# Patient Record
Sex: FEMALE | Race: WHITE | NOT HISPANIC OR LATINO | Employment: OTHER | URBAN - NONMETROPOLITAN AREA
[De-identification: names, ages, dates, MRNs, and addresses within clinical notes are randomized per-mention and may not be internally consistent; named-entity substitution may affect disease eponyms.]

---

## 2017-01-30 DIAGNOSIS — I10 BENIGN ESSENTIAL HYPERTENSION: Primary | ICD-10-CM

## 2017-01-30 RX ORDER — FUROSEMIDE 20 MG
20 TABLET ORAL DAILY
Qty: 30 TABLET | Refills: 0 | Status: SHIPPED | OUTPATIENT
Start: 2017-01-30 | End: 2017-02-24

## 2017-01-30 NOTE — TELEPHONE ENCOUNTER
LASIX      Last Written Prescription Date: 1/27/16  Last Fill Quantity: 90, # refills: 3  Last Office Visit with [facility], [facility] or [facility] prescribing provider: 1/27/16       POTASSIUM   Date Value Ref Range Status   01/27/2016 4.5 3.4 - 5.3 mmol/L Final     CREATININE   Date Value Ref Range Status   01/27/2016 1.00 0.52 - 1.04 mg/dL Final     BP Readings from Last 3 Encounters:   01/27/16 134/72   07/28/15 130/82   06/08/15 151/67

## 2017-01-31 DIAGNOSIS — E03.9 ACQUIRED HYPOTHYROIDISM: Primary | ICD-10-CM

## 2017-01-31 RX ORDER — LEVOTHYROXINE SODIUM 112 UG/1
112 TABLET ORAL DAILY
Qty: 30 TABLET | Refills: 11 | Status: SHIPPED | OUTPATIENT
Start: 2017-01-31 | End: 2018-01-25

## 2017-01-31 NOTE — TELEPHONE ENCOUNTER
Patient is at [facility] and is out of her Levothyroxine and is getting sick.    Mela [place]

## 2017-01-31 NOTE — TELEPHONE ENCOUNTER
TSH   Date Value Ref Range Status   01/27/2016 3.43 0.40 - 4.00 mU/L Final   06/05/2015 3.58 0.40 - 4.00 mU/L Final   04/03/2015 6.18* 0.40 - 4.00 mU/L Final     Comment:     Effective 7/30/2014, the reference range for this assay has changed to reflect   new instrumentation/methodology.     01/23/2015 4.74* 0.40 - 4.00 mU/L Final     Comment:     Effective 7/30/2014, the reference range for this assay has changed to reflect   new instrumentation/methodology.     11/12/2014 5.92* 0.40 - 4.00 mU/L Final     Comment:     Effective 7/30/2014, the reference range for this assay has changed to reflect   new instrumentation/methodology.         Refill given.  SPENCER Keith RN

## 2017-02-24 ENCOUNTER — OFFICE VISIT (OUTPATIENT)
Dept: FAMILY MEDICINE | Facility: CLINIC | Age: 82
End: 2017-02-24
Payer: COMMERCIAL

## 2017-02-24 VITALS
RESPIRATION RATE: 18 BRPM | TEMPERATURE: 98 F | HEART RATE: 68 BPM | DIASTOLIC BLOOD PRESSURE: 84 MMHG | OXYGEN SATURATION: 97 % | SYSTOLIC BLOOD PRESSURE: 126 MMHG | BODY MASS INDEX: 33.76 KG/M2 | WEIGHT: 206 LBS

## 2017-02-24 DIAGNOSIS — Z23 ENCOUNTER FOR IMMUNIZATION: ICD-10-CM

## 2017-02-24 DIAGNOSIS — K29.50 CHRONIC GASTRITIS WITHOUT BLEEDING, UNSPECIFIED GASTRITIS TYPE: ICD-10-CM

## 2017-02-24 DIAGNOSIS — I10 BENIGN ESSENTIAL HYPERTENSION: ICD-10-CM

## 2017-02-24 DIAGNOSIS — E03.9 ACQUIRED HYPOTHYROIDISM: Primary | ICD-10-CM

## 2017-02-24 DIAGNOSIS — R73.09 ELEVATED GLUCOSE: ICD-10-CM

## 2017-02-24 LAB
ANION GAP SERPL CALCULATED.3IONS-SCNC: 10 MMOL/L (ref 3–14)
BUN SERPL-MCNC: 21 MG/DL (ref 7–30)
CALCIUM SERPL-MCNC: 8.2 MG/DL (ref 8.5–10.1)
CHLORIDE SERPL-SCNC: 99 MMOL/L (ref 94–109)
CO2 SERPL-SCNC: 25 MMOL/L (ref 20–32)
CREAT SERPL-MCNC: 0.97 MG/DL (ref 0.52–1.04)
GFR SERPL CREATININE-BSD FRML MDRD: 54 ML/MIN/1.7M2
GLUCOSE SERPL-MCNC: 218 MG/DL (ref 70–99)
POTASSIUM SERPL-SCNC: 4.2 MMOL/L (ref 3.4–5.3)
SODIUM SERPL-SCNC: 134 MMOL/L (ref 133–144)
TSH SERPL DL<=0.005 MIU/L-ACNC: 3.34 MU/L (ref 0.4–4)

## 2017-02-24 PROCEDURE — 84443 ASSAY THYROID STIM HORMONE: CPT | Performed by: NURSE PRACTITIONER

## 2017-02-24 PROCEDURE — 36415 COLL VENOUS BLD VENIPUNCTURE: CPT | Performed by: NURSE PRACTITIONER

## 2017-02-24 PROCEDURE — 83036 HEMOGLOBIN GLYCOSYLATED A1C: CPT | Performed by: NURSE PRACTITIONER

## 2017-02-24 PROCEDURE — 99214 OFFICE O/P EST MOD 30 MIN: CPT | Mod: 25 | Performed by: NURSE PRACTITIONER

## 2017-02-24 PROCEDURE — G0009 ADMIN PNEUMOCOCCAL VACCINE: HCPCS | Performed by: NURSE PRACTITIONER

## 2017-02-24 PROCEDURE — 80048 BASIC METABOLIC PNL TOTAL CA: CPT | Performed by: NURSE PRACTITIONER

## 2017-02-24 PROCEDURE — 90670 PCV13 VACCINE IM: CPT | Performed by: NURSE PRACTITIONER

## 2017-02-24 RX ORDER — FUROSEMIDE 20 MG
20 TABLET ORAL DAILY
Qty: 30 TABLET | Refills: 0 | Status: CANCELLED | OUTPATIENT
Start: 2017-02-24

## 2017-02-24 RX ORDER — OMEPRAZOLE 40 MG/1
40 CAPSULE, DELAYED RELEASE ORAL DAILY
Qty: 30 CAPSULE | Refills: 0 | Status: CANCELLED | OUTPATIENT
Start: 2017-02-24

## 2017-02-24 NOTE — PROGRESS NOTES
SUBJECTIVE:                                                    Adela Means is a 89 year old female who presents to clinic today for the following health issues:      Medication Followup of  LASIX, PRILOSEC    Taking Medication as prescribed: yes    Side Effects:  None    Medication Helping Symptoms:  yes     Denies any trouble with reflex  Has never tried not taking the omeprazole     She is taking lasix for her blood pressure  Denies any trouble with swelling in her legs  BP Readings from Last 6 Encounters:   02/24/17 126/84   01/27/16 134/72   07/28/15 130/82   06/08/15 151/67   06/05/15 122/72   04/03/15 142/84     Denies any dizziness or falls   Reports having to go to the bathroom frequently     TSH   Date Value Ref Range Status   01/27/2016 3.43 0.40 - 4.00 mU/L Final   06/05/2015 3.58 0.40 - 4.00 mU/L Final   04/03/2015 6.18 (H) 0.40 - 4.00 mU/L Final     Comment:     Effective 7/30/2014, the reference range for this assay has changed to reflect   new instrumentation/methodology.     01/23/2015 4.74 (H) 0.40 - 4.00 mU/L Final     Comment:     Effective 7/30/2014, the reference range for this assay has changed to reflect   new instrumentation/methodology.     11/12/2014 5.92 (H) 0.40 - 4.00 mU/L Final     Comment:     Effective 7/30/2014, the reference range for this assay has changed to reflect   new instrumentation/methodology.       Due for a TSH recheck   Denies any new current symptoms              Problem list and histories reviewed & adjusted, as indicated.  Additional history: as documented    Problem list, Medication list, Allergies, and Medical/Social/Surgical histories reviewed in [facility] and updated as appropriate.    ROS:  Constitutional, HEENT, cardiovascular, pulmonary, gi and gu systems are negative, except as otherwise noted.    OBJECTIVE:                                                    /84 (BP Location: Right arm, Patient Position: Chair, Cuff Size: Adult Regular)  Pulse 68  Temp  98  F (36.7  C) (Tympanic)  Resp 18  Wt 206 lb (93.4 kg)  SpO2 97%  BMI 33.76 kg/m2  Body mass index is 33.76 kg/(m^2).  GENERAL APPEARANCE: healthy, alert and no distress  RESP: lungs clear to auscultation - no rales, rhonchi or wheezes  CV: regular rates and rhythm, normal S1 S2, no S3 or S4 and no murmur, click or rub  ABDOMEN: soft, nontender, without hepatosplenomegaly or masses and bowel sounds normal  SKIN: no suspicious lesions or rashes  PSYCH: mentation appears normal and affect normal/bright    Diagnostic test results:  Diagnostic Test Results:  Pending      ASSESSMENT/PLAN:                                                    1. Benign essential hypertension  Stable   Will stop the diuretic today   Recheck in 2 weeks   BP goal is < 150/80mmHg to avoid risk of hypotension, falls, dizziness and tissue hypoperfusion.    - Basic metabolic panel    2. Gastritis  Discussed long term PPI use is not recommended   Will stop the omeprazole   Recheck in 2 weeks      3. Acquired hypothyroidism  Will check TSH today   Refill accordingly   - TSH with free T4 reflex    4. Encounter for immunization  Given today   She did have some mild swelling after injection   But no trouble breathing   She was monitor for 15 minutes prior to d/c   - PNEUMOCOCCAL CONJ VACCINE 13 VALENT IM (PREVNAR 13)      Patient Instructions   Pneumonia booster today     Labs checked      Stop the omeprazole (watch for increase upset stomach, heart burn)    Stop the furosemide (watch for increased swelling)      See me back in 2 weeks for a recheck on medication changes     Will refill the levothyroxine once I get you lab results     Nice meeting you today!        Sofia Ascencio NP  [facility]

## 2017-02-24 NOTE — NURSING NOTE
"No chief complaint on file.      Initial /84 (BP Location: Right arm, Patient Position: Chair, Cuff Size: Adult Regular)  Pulse 68  Temp 98  F (36.7  C) (Tympanic)  Resp 18  Wt 206 lb (93.4 kg)  SpO2 97%  BMI 33.76 kg/m2 Estimated body mass index is 33.76 kg/(m^2) as calculated from the following:    Height as of 1/27/16: 5' 5.5\" (1.664 m).    Weight as of this encounter: 206 lb (93.4 kg).  Medication Reconciliation: complete    Health Maintenance that is potentially due pending provider review:  none    n/a      "

## 2017-02-24 NOTE — MR AVS SNAPSHOT
"              After Visit Summary   2/24/2017    Adela Means    MRN: [id number]           Patient Information     Date Of Birth          3/10/1927        Visit Information        Provider Department      2/24/2017 1:00 PM Sofia Ascencio NP [facility]        Today's Diagnoses     Acquired hypothyroidism    -  1    Benign essential hypertension        Gastritis          Care Instructions    Pneumonia booster today     Labs checked      Stop the omeprazole (watch for increase upset stomach, heart burn)    Stop the furosemide (watch for increased swelling)      See me back in 2 weeks for a recheck on medication changes     Will refill the levothyroxine once I get you lab results     Nice meeting you today!          Follow-ups after your visit        Who to contact     If you have questions or need follow up information about today's clinic visit or your schedule please contact [facility] directly at [phone].  Normal or non-critical lab and imaging results will be communicated to you by [employer], letter or phone within 4 business days after the clinic has received the results. If you do not hear from us within 7 days, please contact the clinic through [employer] or phone. If you have a critical or abnormal lab result, we will notify you by phone as soon as possible.  Submit refill requests through [employer] or call your pharmacy and they will forward the refill request to us. Please allow 3 business days for your refill to be completed.          Additional Information About Your Visit        [employer] Information     [employer] lets you send messages to your doctor, view your test results, renew your prescriptions, schedule appointments and more. To sign up, go to [email or web address] . Click on \"Log in\" on the left side of the screen, which will take you to the Welcome page. Then click on \"Sign up Now\" on the right side of the page.     You will be asked to enter the access code " listed below, as well as some personal information. Please follow the directions to create your username and password.     Your access code is: [email or web address]  Expires: 2017  1:21 PM     Your access code will  in 90 days. If you need help or a new code, please call your [facility] or [phone].        Care EveryWhere ID     This is your Care EveryWhere ID. This could be used by other organizations to access your [place] medical records  [id number]        Your Vitals Were     Pulse Temperature Respirations Pulse Oximetry BMI (Body Mass Index)       68 98  F (36.7  C) (Tympanic) 18 97% 33.76 kg/m2        Blood Pressure from Last 3 Encounters:   17 126/84   16 134/72   07/28/15 130/82    Weight from Last 3 Encounters:   17 206 lb (93.4 kg)   16 211 lb (95.7 kg)   07/28/15 208 lb (94.3 kg)              We Performed the Following     Basic metabolic panel     TSH with free T4 reflex          Today's Medication Changes          These changes are accurate as of: 17  1:21 PM.  If you have any questions, ask your nurse or doctor.               Stop taking these medicines if you haven't already. Please contact your care team if you have questions.     furosemide 20 MG tablet   Commonly known as:  LASIX   Stopped by:  Sofia Ascencio NP           omeprazole 40 MG capsule   Commonly known as:  priLOSEC   Stopped by:  Sofia Ascencio NP                    Primary Care Provider Office Phone # Fax #    Yoselyn Castrejon [phone]       [address] [address]        Thank you!     Thank you for choosing [facility]  for your care. Our goal is always to provide you with excellent care. Hearing back from our patients is one way we can continue to improve our services. Please take a few minutes to complete the written survey that you may receive in the mail after your visit with us. Thank you!              Your Updated Medication List - Protect others around you: Learn how to safely use, store and throw away your medicines at www.disposemymeds.org.          This list is accurate as of: 2/24/17  1:21 PM.  Always use your most recent med list.                   Brand Name Dispense Instructions for use    aspirin 81 MG tablet     90 tablet    Take 1 tablet (81 mg) by mouth daily       calcium carbonate 600 MG tablet    OS-KATHY 600 mg [place]. Ca    180 tablet    Take 1 tablet (600 mg) by mouth 2 times daily (with meals)       cholecalciferol 1000 UNIT tablet    vitamin D    90 tablet    Take 1 tablet (1,000 Units) by mouth daily       clobetasol 0.05 % external solution    TEMOVATE    100 mL    Apply sparingly to affected area twice daily for 14 days.  Do not apply to face.       fish oil-omega-3 fatty acids 1000 MG capsule      Take 1 capsule by mouth 2 times daily.       fluocinolone 0.01 % cream    SYNALAR    30 g    Apply topically 2 times daily       Incontinence Supply Disposable Misc     3 months    use daily       ketoconazole 2 % shampoo    NIZORAL    240 mL    Apply to the affected area and wash off after 5 minutes. Use 2-3 times per week.       levothyroxine 112 MCG tablet    SYNTHROID    30 tablet    Take 1 tablet (112 mcg) by mouth daily       * order for DME     1 each    Equipment being ordered: GLOVES       * order for DME     1 each    Equipment being ordered: INCONTINENT PRODUCT UP / MONTH.       Salicylic Acid 3 % Sham     1 Bottle    Use shampoo twice a week.       sodium chloride 0.65 % nasal spray    OCEAN    1 Bottle    Spray 1 spray in nostril daily as needed for congestion.       * Notice:  This list has 2 medication(s) that are the same as other medications prescribed for you. Read the directions carefully, and ask your doctor or other care provider to review them with you.

## 2017-02-24 NOTE — PATIENT INSTRUCTIONS
Pneumonia booster today     Labs checked      Stop the omeprazole (watch for increase upset stomach, heart burn)    Stop the furosemide (watch for increased swelling)      See me back in 2 weeks for a recheck on medication changes     Will refill the levothyroxine once I get you lab results     Nice meeting you today!

## 2017-02-27 LAB — HBA1C MFR BLD: 7.3 % (ref 4.3–6)

## 2017-03-07 ENCOUNTER — OFFICE VISIT (OUTPATIENT)
Dept: FAMILY MEDICINE | Facility: CLINIC | Age: 82
End: 2017-03-07
Payer: COMMERCIAL

## 2017-03-07 VITALS
HEIGHT: 66 IN | WEIGHT: 208 LBS | BODY MASS INDEX: 33.43 KG/M2 | TEMPERATURE: 96.7 F | HEART RATE: 64 BPM | SYSTOLIC BLOOD PRESSURE: 138 MMHG | RESPIRATION RATE: 16 BRPM | DIASTOLIC BLOOD PRESSURE: 70 MMHG

## 2017-03-07 DIAGNOSIS — R60.9 EDEMA, UNSPECIFIED TYPE: Primary | ICD-10-CM

## 2017-03-07 DIAGNOSIS — E11.9 TYPE 2 DIABETES MELLITUS WITHOUT COMPLICATION, WITHOUT LONG-TERM CURRENT USE OF INSULIN (H): ICD-10-CM

## 2017-03-07 DIAGNOSIS — K29.50 CHRONIC GASTRITIS WITHOUT BLEEDING, UNSPECIFIED GASTRITIS TYPE: ICD-10-CM

## 2017-03-07 LAB
CREAT UR-MCNC: 58 MG/DL
MICROALBUMIN UR-MCNC: 43 MG/L
MICROALBUMIN/CREAT UR: 73.96 MG/G CR (ref 0–25)

## 2017-03-07 PROCEDURE — 82043 UR ALBUMIN QUANTITATIVE: CPT | Performed by: NURSE PRACTITIONER

## 2017-03-07 PROCEDURE — 99213 OFFICE O/P EST LOW 20 MIN: CPT | Performed by: NURSE PRACTITIONER

## 2017-03-07 RX ORDER — FUROSEMIDE 20 MG
20 TABLET ORAL DAILY
Qty: 90 TABLET | Refills: 3 | Status: SHIPPED | OUTPATIENT
Start: 2017-03-07 | End: 2018-03-13

## 2017-03-07 NOTE — PATIENT INSTRUCTIONS
Restart the water the lasix.     Stay off the omeprazole  TUMS are OK if upset stomach or reflux      Blood sugar is in the diabetes range   Watch sugar intake     Try and exercise for 30 minutes most days      See me back in 6 months

## 2017-03-07 NOTE — PROGRESS NOTES
"  SUBJECTIVE:                                                    Adela Means is a 89 year old female who presents to clinic today for the following health issues:       Medication Recheck      Duration: 2 week follow up    Description (location/character/radiation): follow up since med change    Intensity:  mild    Accompanying signs and symptoms: none    History (similar episodes/previous evaluation): None    Precipitating or alleviating factors: None    Therapies tried and outcome: None     Stopped the lasix   Reports that she has trouble now with urinating and feeling as though she needs to strain to go.     Has done fine off the omeprazole   Take Tums occasionally       Problem list and histories reviewed & adjusted, as indicated.  Additional history: as documented    Labs reviewed in EPIC    Reviewed and updated as needed this visit by clinical staff  Tobacco  Allergies  Med Hx  Surg Hx  Fam Hx  Soc Hx      Reviewed and updated as needed this visit by Provider         ROS:  Constitutional, HEENT, cardiovascular, pulmonary, gi and gu systems are negative, except as otherwise noted.    OBJECTIVE:                                                    /70 (BP Location: Right arm, Cuff Size: Adult Regular)  Pulse 64  Temp 96.7  F (35.9  C) (Tympanic)  Resp 16  Ht 5' 5.5\" (1.664 m)  Wt 208 lb (94.3 kg)  Breastfeeding? No  BMI 34.09 kg/m2  Body mass index is 34.09 kg/(m^2).  GENERAL APPEARANCE: healthy, alert and no distress  RESP: lungs clear to auscultation - no rales, rhonchi or wheezes  CV: regular rates and rhythm, normal S1 S2, no S3 or S4 and no murmur, click or rub  ABDOMEN: soft, nontender, without hepatosplenomegaly or masses and bowel sounds normal    Diagnostic test results:  Diagnostic Test Results:  Results for orders placed or performed in visit on 02/24/17   TSH with free T4 reflex   Result Value Ref Range    TSH 3.34 0.40 - 4.00 mU/L   Basic metabolic panel   Result Value Ref Range    " Sodium 134 133 - 144 mmol/L    Potassium 4.2 3.4 - 5.3 mmol/L    Chloride 99 94 - 109 mmol/L    Carbon Dioxide 25 20 - 32 mmol/L    Anion Gap 10 3 - 14 mmol/L    Glucose 218 (H) 70 - 99 mg/dL    Urea Nitrogen 21 7 - 30 mg/dL    Creatinine 0.97 0.52 - 1.04 mg/dL    GFR Estimate 54 (L) >60 mL/min/1.7m2    GFR Estimate If Black 65 >60 mL/min/1.7m2    Calcium 8.2 (L) 8.5 - 10.1 mg/dL   Hemoglobin A1c   Result Value Ref Range    Hemoglobin A1C 7.3 (H) 4.3 - 6.0 %        ASSESSMENT/PLAN:                                                    1. Edema, unspecified type  Reports feeling like unable to urinate   Wants to restart the lasix   Will restart today   - furosemide (LASIX) 20 MG tablet; Take 1 tablet (20 mg) by mouth daily  Dispense: 90 tablet; Refill: 3    2. Type 2 diabetes mellitus without complication, without long-term current use of insulin (H)  New diagnosis   Given age goal is A1C to remain below 8%  Will not start medication rather diet and exercise and monitor   Recheck in 6 months   - Albumin Random Urine Quantitative  - ACE/ARB NOT PRESCRIBED, INTENTIONAL,; Please choose reason not prescribed, below    3. Chronic gastritis without bleeding, unspecified gastritis type  Has done well off the PPI      Patient Instructions   Restart the water the lasix.     Stay off the omeprazole  TUMS are OK if upset stomach or reflux      Blood sugar is in the diabetes range   Watch sugar intake     Try and exercise for 30 minutes most days      See me back in 6 months           Sofia Ascencio NP  [facility]

## 2017-03-07 NOTE — NURSING NOTE
"Chief Complaint   Patient presents with     Hypertension     recheck after med changes       Initial /70 (BP Location: Right arm, Cuff Size: Adult Regular)  Pulse 64  Temp 96.7  F (35.9  C) (Tympanic)  Resp 16  Ht 5' 5.5\" (1.664 m)  Wt 208 lb (94.3 kg)  Breastfeeding? No  BMI 34.09 kg/m2 Estimated body mass index is 34.09 kg/(m^2) as calculated from the following:    Height as of this encounter: 5' 5.5\" (1.664 m).    Weight as of this encounter: 208 lb (94.3 kg).  Medication Reconciliation: complete    Health Maintenance that is potentially due pending provider review:  NONE    n/a    Ladi Law CMA    "

## 2017-03-07 NOTE — MR AVS SNAPSHOT
"              After Visit Summary   3/7/2017    Adela Means    MRN: [id number]           Patient Information     Date Of Birth          3/10/1927        Visit Information        Provider Department      3/7/2017 1:40 PM Sofia Ascencio NP [facility]        Today's Diagnoses     Edema, unspecified type    -  1    Type 2 diabetes mellitus without complication, without long-term current use of insulin (H)          Care Instructions    Restart the water the lasix.     Stay off the omeprazole  TUMS are OK if upset stomach or reflux      Blood sugar is in the diabetes range   Watch sugar intake     Try and exercise for 30 minutes most days      See me back in 6 months             Follow-ups after your visit        Who to contact     If you have questions or need follow up information about today's clinic visit or your schedule please contact [facility] directly at [phone].  Normal or non-critical lab and imaging results will be communicated to you by [employer], letter or phone within 4 business days after the clinic has received the results. If you do not hear from us within 7 days, please contact the clinic through [employer] or phone. If you have a critical or abnormal lab result, we will notify you by phone as soon as possible.  Submit refill requests through [employer] or call your pharmacy and they will forward the refill request to us. Please allow 3 business days for your refill to be completed.          Additional Information About Your Visit        MyChart Information     [employer] lets you send messages to your doctor, view your test results, renew your prescriptions, schedule appointments and more. To sign up, go to [email or web address] . Click on \"Log in\" on the left side of the screen, which will take you to the Welcome page. Then click on \"Sign up Now\" on the right side of the page.     You will be asked to enter the access code listed below, as well as some personal " "information. Please follow the directions to create your username and password.     Your access code is: [email or web address]  Expires: 2017  1:21 PM     Your access code will  in 90 days. If you need help or a new code, please call your [facility] or [phone].        Care EveryWhere ID     This is your Care EveryWhere ID. This could be used by other organizations to access your [place] medical records  [id number]        Your Vitals Were     Pulse Temperature Respirations Height Breastfeeding? BMI (Body Mass Index)    64 96.7  F (35.9  C) (Tympanic) 16 5' 5.5\" (1.664 m) No 34.09 kg/m2       Blood Pressure from Last 3 Encounters:   17 138/70   17 126/84   16 134/72    Weight from Last 3 Encounters:   17 208 lb (94.3 kg)   17 206 lb (93.4 kg)   16 211 lb (95.7 kg)              Today, you had the following     No orders found for display         Today's Medication Changes          These changes are accurate as of: 3/7/17  2:01 PM.  If you have any questions, ask your nurse or doctor.               Start taking these medicines.        Dose/Directions    furosemide 20 MG tablet   Commonly known as:  LASIX   Used for:  Edema, unspecified type, Type 2 diabetes mellitus without complication, without long-term current use of insulin (H)   Started by:  Sofia Ascencio, VERONICA        Dose:  20 mg   Take 1 tablet (20 mg) by mouth daily   Quantity:  90 tablet   Refills:  3            Where to get your medicines      These medications were sent to [id number] - [facility] [address]   [address] , [facility] [address]     Phone:  [phone]     furosemide 20 MG tablet                Primary Care Provider Office Phone # Fax #    Yoselyn Castrejon [phone]       [facility] [address] [address]        Thank you!     Thank you for choosing [facility]  for your care. Our goal is " always to provide you with excellent care. Hearing back from our patients is one way we can continue to improve our services. Please take a few minutes to complete the written survey that you may receive in the mail after your visit with us. Thank you!             Your Updated Medication List - Protect others around you: Learn how to safely use, store and throw away your medicines at www.disposemymeds.org.          This list is accurate as of: 3/7/17  2:01 PM.  Always use your most recent med list.                   Brand Name Dispense Instructions for use    aspirin 81 MG tablet     90 tablet    Take 1 tablet (81 mg) by mouth daily       calcium carbonate 600 MG tablet    OS-KATHY 600 mg [place]. Ca    180 tablet    Take 1 tablet (600 mg) by mouth 2 times daily (with meals)       cholecalciferol 1000 UNIT tablet    vitamin D    90 tablet    Take 1 tablet (1,000 Units) by mouth daily       clobetasol 0.05 % external solution    TEMOVATE    100 mL    Apply sparingly to affected area twice daily for 14 days.  Do not apply to face.       fish oil-omega-3 fatty acids 1000 MG capsule      Take 1 capsule by mouth 2 times daily.       fluocinolone 0.01 % cream    SYNALAR    30 g    Apply topically 2 times daily       furosemide 20 MG tablet    LASIX    90 tablet    Take 1 tablet (20 mg) by mouth daily       Incontinence Supply Disposable Misc     3 months    use daily       ketoconazole 2 % shampoo    NIZORAL    240 mL    Apply to the affected area and wash off after 5 minutes. Use 2-3 times per week.       levothyroxine 112 MCG tablet    SYNTHROID    30 tablet    Take 1 tablet (112 mcg) by mouth daily       * order for DME     1 each    Equipment being ordered: GLOVES       * order for DME     1 each    Equipment being ordered: INCONTINENT PRODUCT UP / MONTH.       Salicylic Acid 3 % Sham     1 Bottle    Use shampoo twice a week.       sodium chloride 0.65 % nasal spray    OCEAN    1 Bottle    Spray 1 spray in nostril  daily as needed for congestion.       * Notice:  This list has 2 medication(s) that are the same as other medications prescribed for you. Read the directions carefully, and ask your doctor or other care provider to review them with you.

## 2017-03-13 PROBLEM — E11.9 TYPE 2 DIABETES MELLITUS WITHOUT COMPLICATION, WITHOUT LONG-TERM CURRENT USE OF INSULIN (H): Status: ACTIVE | Noted: 2017-03-13

## 2017-03-30 DIAGNOSIS — M85.80 OSTEOPENIA: ICD-10-CM

## 2017-03-30 DIAGNOSIS — I10 BENIGN ESSENTIAL HYPERTENSION: ICD-10-CM

## 2018-01-25 DIAGNOSIS — E03.9 ACQUIRED HYPOTHYROIDISM: ICD-10-CM

## 2018-01-25 RX ORDER — LEVOTHYROXINE SODIUM 112 UG/1
112 TABLET ORAL DAILY
Qty: 30 TABLET | Refills: 0 | Status: SHIPPED | OUTPATIENT
Start: 2018-01-25 | End: 2018-02-22

## 2018-01-25 NOTE — TELEPHONE ENCOUNTER
TSH   Date Value Ref Range Status   02/24/2017 3.34 0.40 - 4.00 mU/L Final   01/27/2016 3.43 0.40 - 4.00 mU/L Final   06/05/2015 3.58 0.40 - 4.00 mU/L Final   04/03/2015 6.18 (H) 0.40 - 4.00 mU/L Final     Comment:     Effective 7/30/2014, the reference range for this assay has changed to reflect   new instrumentation/methodology.     01/23/2015 4.74 (H) 0.40 - 4.00 mU/L Final     Comment:     Effective 7/30/2014, the reference range for this assay has changed to reflect   new instrumentation/methodology.         Refilled with attached appt reminder message.  SPENCER Keith RN

## 2018-01-25 NOTE — TELEPHONE ENCOUNTER
"Requested Prescriptions   Pending Prescriptions Disp Refills     levothyroxine (SYNTHROID/LEVOTHROID) 112 MCG tablet [Pharmacy Med Name: LEVOTHYROXIN 112MCG TAB] 30 tablet 11     Sig: TAKE ONE TABLET BY MOUTH ONCE DAILY    Thyroid Protocol Passed    1/25/2018  9:00 AM       Passed - Patient is 12 years or older       Passed - Recent or future visit with authorizing provider's specialty    Patient had office visit in the last year or has a visit in the next 30 days with authorizing provider.  See \"Patient Info\" tab in inbasket, or \"Choose Columns\" in Meds & Orders section of the refill encounter.            Passed - Normal TSH on file in past 12 months    Recent Labs   Lab Test  02/24/17   1330   TSH  3.34             Passed - No active pregnancy on record    If patient is pregnant or has had a positive pregnancy test, please check TSH.         Passed - No positive pregnancy test in past 12 months    If patient is pregnant or has had a positive pregnancy test, please check TSH.          Last Written Prescription Date:  1/31/17  Last Fill Quantity: 30,  # refills: 11   Last Office Visit with [facility], [facility] or [facility] prescribing provider:  3/7/17   Future Office Visit:       "

## 2018-02-22 DIAGNOSIS — E03.9 ACQUIRED HYPOTHYROIDISM: ICD-10-CM

## 2018-02-22 RX ORDER — LEVOTHYROXINE SODIUM 112 UG/1
TABLET ORAL
Qty: 14 TABLET | Refills: 0 | Status: SHIPPED | OUTPATIENT
Start: 2018-02-22 | End: 2018-03-12

## 2018-02-22 NOTE — LETTER
February 22, 2018      Adela Means  [address] [facility] [address]        Dear Adela,         In review of your medical record for a recent medication refill it is noted that you are due for an    office visit and FASTING lab. Your prescription has been refilled for 2 weeks. Please schedule    an appointment prior to further refill.           Sincerely,        Yoselyn Castrejon CNP/lc

## 2018-02-22 NOTE — TELEPHONE ENCOUNTER
"Requested Prescriptions   Pending Prescriptions Disp Refills     levothyroxine (SYNTHROID/LEVOTHROID) 112 MCG tablet [Pharmacy Med Name: LEVOTHYROXIN 112MCG TAB] 30 tablet 0     Sig: TAKE ONE TABLET BY MOUTH ONCE DAILY NEEDS  OFFICE  VISIT    Thyroid Protocol Passed    2/22/2018  9:15 AM       Passed - Patient is 12 years or older       Passed - Recent or future visit with authorizing provider's specialty    Patient had office visit in the last year or has a visit in the next 30 days with authorizing provider.  See \"Patient Info\" tab in inbasket, or \"Choose Columns\" in Meds & Orders section of the refill encounter.            Passed - Normal TSH on file in past 12 months    Recent Labs   Lab Test  02/24/17   1330   TSH  3.34             Passed - No active pregnancy on record    If patient is pregnant or has had a positive pregnancy test, please check TSH.         Passed - No positive pregnancy test in past 12 months    If patient is pregnant or has had a positive pregnancy test, please check TSH.          Last Written Prescription Date:  1/25/2018  Last Fill Quantity: 30,  # refills: 0   Last office visit: 3/7/2017 with prescribing provider:  3/7/2017   Future Office Visit:      "

## 2018-02-22 NOTE — TELEPHONE ENCOUNTER
TSH   Date Value Ref Range Status   02/24/2017 3.34 0.40 - 4.00 mU/L Final   01/27/2016 3.43 0.40 - 4.00 mU/L Final   06/05/2015 3.58 0.40 - 4.00 mU/L Final   04/03/2015 6.18 (H) 0.40 - 4.00 mU/L Final     Comment:     Effective 7/30/2014, the reference range for this assay has changed to reflect   new instrumentation/methodology.     01/23/2015 4.74 (H) 0.40 - 4.00 mU/L Final     Comment:     Effective 7/30/2014, the reference range for this assay has changed to reflect   new instrumentation/methodology.       Refilled x2 wks, appt reminder letter sent.  SPENCER Keith RN

## 2018-03-12 DIAGNOSIS — E03.9 ACQUIRED HYPOTHYROIDISM: ICD-10-CM

## 2018-03-12 NOTE — TELEPHONE ENCOUNTER
"Requested Prescriptions   Pending Prescriptions Disp Refills     levothyroxine (SYNTHROID/LEVOTHROID) 112 MCG tablet [Pharmacy Med Name: LEVOTHYROXIN 112MCG TAB] 14 tablet 0     Sig: TAKE 1 TABLET BY MOUTH ONCE DAILY --NEEDS  OFFICE  VISIT    Thyroid Protocol Failed    3/12/2018 10:40 AM       Failed - Normal TSH on file in past 12 months    Recent Labs   Lab Test  02/24/17   1330   TSH  3.34             Passed - Patient is 12 years or older       Passed - Recent (12 mo) or future (30 days) visit within the authorizing provider's specialty    Patient had office visit in the last 12 months or has a visit in the next 30 days with authorizing provider or within the authorizing provider's specialty.  See \"Patient Info\" tab in inbasket, or \"Choose Columns\" in Meds & Orders section of the refill encounter.           Passed - No active pregnancy on record    If patient is pregnant or has had a positive pregnancy test, please check TSH.         Passed - No positive pregnancy test in past 12 months    If patient is pregnant or has had a positive pregnancy test, please check TSH.          levothyroxine (SYNTHROID/LEVOTHROID) 112 MCG tablet  Last Written Prescription Date:  02/22/2018  Last Fill Quantity: 14 tablet,  # refills: 0   Last office visit: 3/7/2017 with prescribing provider:  TUCKER Ascencio   Future Office Visit:   Next 5 appointments (look out 90 days)     Mar 13, 2018  9:40 AM CDT   SHORT with Yoselyn Castrejon CNP   [facility] ([facility])    [address] [place] [id number]   [phone]                 Elza SEGUNDO (R) (M)    "

## 2018-03-13 ENCOUNTER — OFFICE VISIT (OUTPATIENT)
Dept: FAMILY MEDICINE | Facility: CLINIC | Age: 83
End: 2018-03-13
Payer: COMMERCIAL

## 2018-03-13 VITALS
HEART RATE: 64 BPM | BODY MASS INDEX: 33.59 KG/M2 | RESPIRATION RATE: 20 BRPM | DIASTOLIC BLOOD PRESSURE: 74 MMHG | HEIGHT: 66 IN | SYSTOLIC BLOOD PRESSURE: 136 MMHG | TEMPERATURE: 98.1 F | WEIGHT: 209 LBS

## 2018-03-13 DIAGNOSIS — E78.1 HYPERTRIGLYCERIDEMIA: Chronic | ICD-10-CM

## 2018-03-13 DIAGNOSIS — L21.9 DERMATITIS, SEBORRHEIC: ICD-10-CM

## 2018-03-13 DIAGNOSIS — E11.9 TYPE 2 DIABETES MELLITUS WITHOUT COMPLICATION, WITHOUT LONG-TERM CURRENT USE OF INSULIN (H): Primary | ICD-10-CM

## 2018-03-13 DIAGNOSIS — I10 BENIGN ESSENTIAL HYPERTENSION: ICD-10-CM

## 2018-03-13 DIAGNOSIS — E03.9 ACQUIRED HYPOTHYROIDISM: Chronic | ICD-10-CM

## 2018-03-13 DIAGNOSIS — R60.9 EDEMA, UNSPECIFIED TYPE: ICD-10-CM

## 2018-03-13 LAB
ALBUMIN SERPL-MCNC: 3.8 G/DL (ref 3.4–5)
ALP SERPL-CCNC: 57 U/L (ref 40–150)
ALT SERPL W P-5'-P-CCNC: 38 U/L (ref 0–50)
ANION GAP SERPL CALCULATED.3IONS-SCNC: 7 MMOL/L (ref 3–14)
AST SERPL W P-5'-P-CCNC: 28 U/L (ref 0–45)
BILIRUB SERPL-MCNC: 0.6 MG/DL (ref 0.2–1.3)
BUN SERPL-MCNC: 17 MG/DL (ref 7–30)
CALCIUM SERPL-MCNC: 8.9 MG/DL (ref 8.5–10.1)
CHLORIDE SERPL-SCNC: 99 MMOL/L (ref 94–109)
CHOLEST SERPL-MCNC: 146 MG/DL
CO2 SERPL-SCNC: 27 MMOL/L (ref 20–32)
CREAT SERPL-MCNC: 0.72 MG/DL (ref 0.52–1.04)
CREAT UR-MCNC: 107 MG/DL
GFR SERPL CREATININE-BSD FRML MDRD: 76 ML/MIN/1.7M2
GLUCOSE SERPL-MCNC: 173 MG/DL (ref 70–99)
HDLC SERPL-MCNC: 40 MG/DL
LDLC SERPL CALC-MCNC: 73 MG/DL
MICROALBUMIN UR-MCNC: 55 MG/L
MICROALBUMIN/CREAT UR: 51.68 MG/G CR (ref 0–25)
NONHDLC SERPL-MCNC: 106 MG/DL
POTASSIUM SERPL-SCNC: 4.2 MMOL/L (ref 3.4–5.3)
PROT SERPL-MCNC: 7.7 G/DL (ref 6.8–8.8)
SODIUM SERPL-SCNC: 133 MMOL/L (ref 133–144)
TRIGL SERPL-MCNC: 165 MG/DL
TSH SERPL DL<=0.005 MIU/L-ACNC: 3.36 MU/L (ref 0.4–4)

## 2018-03-13 PROCEDURE — 99214 OFFICE O/P EST MOD 30 MIN: CPT | Performed by: NURSE PRACTITIONER

## 2018-03-13 PROCEDURE — 80053 COMPREHEN METABOLIC PANEL: CPT | Performed by: NURSE PRACTITIONER

## 2018-03-13 PROCEDURE — 82043 UR ALBUMIN QUANTITATIVE: CPT | Performed by: NURSE PRACTITIONER

## 2018-03-13 PROCEDURE — 80061 LIPID PANEL: CPT | Performed by: NURSE PRACTITIONER

## 2018-03-13 PROCEDURE — 36415 COLL VENOUS BLD VENIPUNCTURE: CPT | Performed by: NURSE PRACTITIONER

## 2018-03-13 PROCEDURE — 99207 C FOOT EXAM  NO CHARGE: CPT | Performed by: NURSE PRACTITIONER

## 2018-03-13 PROCEDURE — 84443 ASSAY THYROID STIM HORMONE: CPT | Performed by: NURSE PRACTITIONER

## 2018-03-13 RX ORDER — LEVOTHYROXINE SODIUM 112 UG/1
TABLET ORAL
Qty: 14 TABLET | Refills: 0 | Status: SHIPPED | OUTPATIENT
Start: 2018-03-13 | End: 2018-03-14

## 2018-03-13 RX ORDER — FUROSEMIDE 20 MG
20 TABLET ORAL DAILY
Qty: 90 TABLET | Refills: 3 | Status: SHIPPED | OUTPATIENT
Start: 2018-03-13 | End: 2019-03-21

## 2018-03-13 RX ORDER — CLOBETASOL PROPIONATE 0.5 MG/ML
SOLUTION TOPICAL
Qty: 100 ML | Refills: 1 | Status: SHIPPED | OUTPATIENT
Start: 2018-03-13 | End: 2020-02-28

## 2018-03-13 NOTE — PATIENT INSTRUCTIONS
1. Type 2 diabetes mellitus without complication, without long-term current use of insulin (H), HgbA1c goal <8  Chronic, stable  - FOOT EXAM  - Albumin Random Urine Quantitative with Creat Ratio  - Comprehensive metabolic panel (BMP + Alb, Alk Phos, ALT, AST, Total. Bili, TP)  - Wear slippers at home  - Get foot care regular  - Recheck every 6 months  - Continue eating healthy and using bicycle. GREAT!  - Prescription for diabetic  Lab Results   Component Value Date    A1C 7.3 02/25/2017       2. Benign essential hypertension. BP goal <140/90  Chronic, stable  - aspirin 81 MG tablet; Take 1 tablet (81 mg) by mouth daily  Dispense: 90 tablet; Refill: 3  - furosemide (LASIX) 20 MG tablet; Take 1 tablet (20 mg) by mouth daily  Dispense: 90 tablet; Refill: 3  BP Readings from Last 6 Encounters:   03/13/18 136/74   03/07/17 138/70   02/24/17 126/84   01/27/16 134/72   07/28/15 130/82   06/08/15 151/67     3. Dermatitis, seborrheic  Chronic, stable  - clobetasol (TEMOVATE) 0.05 % external solution; Apply sparingly to affected area twice daily for 14 days.  Do not apply to face.  Dispense: 100 mL; Refill: 1    4. Edema, unspecified type  Chronic, stable  - furosemide (LASIX) 20 MG tablet; Take 1 tablet (20 mg) by mouth daily  Dispense: 90 tablet; Refill: 3    5. Acquired hypothyroidism  Chronic, stable  - TSH with free T4 reflex  - Will refill Levothyroxine when lab comes in  TSH   Date Value Ref Range Status   02/24/2017 3.34 0.40 - 4.00 mU/L Final   01/27/2016 3.43 0.40 - 4.00 mU/L Final   06/05/2015 3.58 0.40 - 4.00 mU/L Final   04/03/2015 6.18 (H) 0.40 - 4.00 mU/L Final     Comment:     Effective 7/30/2014, the reference range for this assay has changed to reflect   new instrumentation/methodology.     01/23/2015 4.74 (H) 0.40 - 4.00 mU/L Final     Comment:     Effective 7/30/2014, the reference range for this assay has changed to reflect   new instrumentation/methodology.       6. Hypertriglyceridemia, LDL goal <100   Chronic, stable  - Lipid panel reflex to direct LDL Fasting  LDL Cholesterol Calculated   Date Value Ref Range Status   02/24/2016 81 <100 mg/dL Final     Comment:     Desirable:       <100 mg/dl   08/05/2013 72 0 - 129 mg/dL Final     Comment:     LDL Cholesterol is the primary guide to therapy: LDL-cholesterol goal in high   risk patients is <100 mg/dL and in very high risk patients is <70 mg/dL.       Diet: Diabetes  Food is an important tool that you can use to control diabetes and stay healthy. Eating well-balanced meals in the correct amounts will help you control your blood glucose levels and prevent low blood sugar reactions. It will also help you reduce the health risks of diabetes. There is no one specific diet that is right for everyone with diabetes. But there are general guidelines to follow. A registered dietitian (RD) will create a tailored diet approach that s just right for you. He or she will also help you plan healthy meals and snacks. If you have any questions, call your dietitian for advice.     Guidelines for success  Talk with your healthcare provider before starting a diabetes diet or weight loss program. If you haven't talked with a dietitian yet, ask your provider for a referral. The following guidelines can help you succeed:    Select foods from the 6 food groups below. Your dietitian will help you find food choices within each group. He or she will also show you serving sizes and how many servings you can have at each meal.    Grains, beans, and starchy vegetables    Vegetables    Fruit    Milk or yogurt    Meat, poultry, fish, or tofu    Healthy fats    Check your blood sugar levels as directed by your provider. Take any medicine as prescribed by your provider.    Learn to read food labels and pick the right portion sizes.    Eat only the amount of food in your meal plan. Eat about the same amount of food at regular times each day. Don t skip meals. Eat meals 4 to 5 hours apart, with  snacks in between.    Limit alcohol. It raises blood sugar levels. Drink water or calorie-free diet drinks that use safe sweeteners.    Eat less fat to help lower your risk of heart disease. Use nonfat or low-fat dairy products and lean meats. Avoid fried foods. Use cooking oils that are unsaturated, such as olive, canola, or peanut oil.    Talk with your dietitian about safe sugar substitutes.    Avoid added salt. It can contribute to high blood pressure, which can cause heart disease. People with diabetes already have a risk of high blood pressure and heart disease.    Stay at a healthy weight. If you need to lose weight, cut down on your portion sizes. But don t skip meals. Exercise is an important part of any weight management program. Talk with your provider about an exercise program that s right for you.    For more information about the best diet plan for you, talk with a registered dietitian (RD). To find an RD in your area, contact:    Academy of Nutrition and Dietetics www.eatright.org    The American Diabetes Association [phone] www.diabetes.org  Date Last Reviewed: 8/1/2016 2000-2017 [employer]. [address] [place] [address], [place] [address]. All rights reserved. This information is not intended as a substitute for professional medical care. Always follow your healthcare professional's instructions.

## 2018-03-13 NOTE — MR AVS SNAPSHOT
After Visit Summary   3/13/2018    Adela Means    MRN: [id number]           Patient Information     Date Of Birth          3/10/1927        Visit Information        Provider Department      3/13/2018 9:40 AM Yoselyn Castrejon, CNP [facility]        Today's Diagnoses     Type 2 diabetes mellitus without complication, without long-term current use of insulin (H)    -  1    Benign essential hypertension        Dermatitis, seborrheic        Edema, unspecified type        Acquired hypothyroidism        Hypertriglyceridemia, LDL goal <100          Care Instructions    1. Type 2 diabetes mellitus without complication, without long-term current use of insulin (H), HgbA1c goal <8  Chronic, stable  - FOOT EXAM  - Albumin Random Urine Quantitative with Creat Ratio  - Comprehensive metabolic panel (BMP + Alb, Alk Phos, ALT, AST, Total. Bili, TP)  - Wear slippers at home  - Get foot care regular  - Recheck every 6 months  - Continue eating healthy and using bicycle. GREAT!  - Prescription for diabetic  Lab Results   Component Value Date    A1C 7.3 02/25/2017       2. Benign essential hypertension. BP goal <140/90  Chronic, stable  - aspirin 81 MG tablet; Take 1 tablet (81 mg) by mouth daily  Dispense: 90 tablet; Refill: 3  - furosemide (LASIX) 20 MG tablet; Take 1 tablet (20 mg) by mouth daily  Dispense: 90 tablet; Refill: 3  BP Readings from Last 6 Encounters:   03/13/18 136/74   03/07/17 138/70   02/24/17 126/84   01/27/16 134/72   07/28/15 130/82   06/08/15 151/67     3. Dermatitis, seborrheic  Chronic, stable  - clobetasol (TEMOVATE) 0.05 % external solution; Apply sparingly to affected area twice daily for 14 days.  Do not apply to face.  Dispense: 100 mL; Refill: 1    4. Edema, unspecified type  Chronic, stable  - furosemide (LASIX) 20 MG tablet; Take 1 tablet (20 mg) by mouth daily  Dispense: 90 tablet; Refill: 3    5. Acquired hypothyroidism  Chronic, stable  - TSH with free T4  reflex  - Will refill Levothyroxine when lab comes in  TSH   Date Value Ref Range Status   02/24/2017 3.34 0.40 - 4.00 mU/L Final   01/27/2016 3.43 0.40 - 4.00 mU/L Final   06/05/2015 3.58 0.40 - 4.00 mU/L Final   04/03/2015 6.18 (H) 0.40 - 4.00 mU/L Final     Comment:     Effective 7/30/2014, the reference range for this assay has changed to reflect   new instrumentation/methodology.     01/23/2015 4.74 (H) 0.40 - 4.00 mU/L Final     Comment:     Effective 7/30/2014, the reference range for this assay has changed to reflect   new instrumentation/methodology.       6. Hypertriglyceridemia, LDL goal <100  Chronic, stable  - Lipid panel reflex to direct LDL Fasting  LDL Cholesterol Calculated   Date Value Ref Range Status   02/24/2016 81 <100 mg/dL Final     Comment:     Desirable:       <100 mg/dl   08/05/2013 72 0 - 129 mg/dL Final     Comment:     LDL Cholesterol is the primary guide to therapy: LDL-cholesterol goal in high   risk patients is <100 mg/dL and in very high risk patients is <70 mg/dL.       Diet: Diabetes  Food is an important tool that you can use to control diabetes and stay healthy. Eating well-balanced meals in the correct amounts will help you control your blood glucose levels and prevent low blood sugar reactions. It will also help you reduce the health risks of diabetes. There is no one specific diet that is right for everyone with diabetes. But there are general guidelines to follow. A registered dietitian (RD) will create a tailored diet approach that s just right for you. He or she will also help you plan healthy meals and snacks. If you have any questions, call your dietitian for advice.     Guidelines for success  Talk with your healthcare provider before starting a diabetes diet or weight loss program. If you haven't talked with a dietitian yet, ask your provider for a referral. The following guidelines can help you succeed:    Select foods from the 6 food groups below. Your dietitian will  help you find food choices within each group. He or she will also show you serving sizes and how many servings you can have at each meal.    Grains, beans, and starchy vegetables    Vegetables    Fruit    Milk or yogurt    Meat, poultry, fish, or tofu    Healthy fats    Check your blood sugar levels as directed by your provider. Take any medicine as prescribed by your provider.    Learn to read food labels and pick the right portion sizes.    Eat only the amount of food in your meal plan. Eat about the same amount of food at regular times each day. Don t skip meals. Eat meals 4 to 5 hours apart, with snacks in between.    Limit alcohol. It raises blood sugar levels. Drink water or calorie-free diet drinks that use safe sweeteners.    Eat less fat to help lower your risk of heart disease. Use nonfat or low-fat dairy products and lean meats. Avoid fried foods. Use cooking oils that are unsaturated, such as olive, canola, or peanut oil.    Talk with your dietitian about safe sugar substitutes.    Avoid added salt. It can contribute to high blood pressure, which can cause heart disease. People with diabetes already have a risk of high blood pressure and heart disease.    Stay at a healthy weight. If you need to lose weight, cut down on your portion sizes. But don t skip meals. Exercise is an important part of any weight management program. Talk with your provider about an exercise program that s right for you.    For more information about the best diet plan for you, talk with a registered dietitian (RD). To find an RD in your area, contact:    Academy of Nutrition and Dietetics www.eatright.org    The American Diabetes Association [phone] www.diabetes.org  Date Last Reviewed: 8/1/2016 2000-2017 [employer]. [address] [place] [address], [place] [address]. All rights reserved. This information is not intended as a substitute for professional medical care. Always follow your healthcare professional's  "instructions.                Follow-ups after your visit        Who to contact     If you have questions or need follow up information about today's clinic visit or your schedule please contact [facility] directly at [phone].  Normal or non-critical lab and imaging results will be communicated to you by MyChart, letter or phone within 4 business days after the clinic has received the results. If you do not hear from us within 7 days, please contact the clinic through MyChart or phone. If you have a critical or abnormal lab result, we will notify you by phone as soon as possible.  Submit refill requests through [employer] or call your pharmacy and they will forward the refill request to us. Please allow 3 business days for your refill to be completed.          Additional Information About Your Visit        [employer] Information     [employer] lets you send messages to your doctor, view your test results, renew your prescriptions, schedule appointments and more. To sign up, go to [email or web address] . Click on \"Log in\" on the left side of the screen, which will take you to the Welcome page. Then click on \"Sign up Now\" on the right side of the page.     You will be asked to enter the access code listed below, as well as some personal information. Please follow the directions to create your username and password.     Your access code is: [email or web address]  Expires: 2018 10:48 AM     Your access code will  in 90 days. If you need help or a new code, please call your [facility] or [phone].        Care EveryWhere ID     This is your Care EveryWhere ID. This could be used by other organizations to access your [facility] medical records  [id number]        Your Vitals Were     Pulse Temperature Respirations Height BMI (Body Mass Index)       64 98.1  F (36.7  C) (Tympanic) 20 5' 5.5\" (1.664 m) 34.25 kg/m2        Blood Pressure from Last 3 Encounters:   18 136/74   17 138/70 "   02/24/17 126/84    Weight from Last 3 Encounters:   03/13/18 209 lb (94.8 kg)   03/07/17 208 lb (94.3 kg)   02/24/17 206 lb (93.4 kg)              We Performed the Following     Albumin Random Urine Quantitative with Creat Ratio     Comprehensive metabolic panel (BMP + Alb, Alk Phos, ALT, AST, Total. Bili, TP)     FOOT EXAM     Lipid panel reflex to direct LDL Fasting     TSH with free T4 reflex          Today's Medication Changes          These changes are accurate as of 3/13/18 10:49 AM.  If you have any questions, ask your nurse or doctor.               Start taking these medicines.        Dose/Directions    order for DME   Used for:  Type 2 diabetes mellitus without complication, without long-term current use of insulin (H)   Started by:  Yoselyn Castrejon CNP        Equipment being ordered: Diabetic shoes   Quantity:  1 Units   Refills:  0            Where to get your medicines      These medications were sent to [facility] [address] [place] [address] [address] [place] [id number]     Phone:  [phone]     aspirin 81 MG tablet    clobetasol 0.05 % external solution    furosemide 20 MG tablet         Some of these will need a paper prescription and others can be bought over the counter.  Ask your nurse if you have questions.     Bring a paper prescription for each of these medications     order for DME                Primary Care Provider Office Phone # Fax #    Yoselyn Castrejon [phone]       [address] [facility] [address]        Equal Access to Services     WING PORTER AH: Hadii edwin fernandezo Sokim, waaxda luqadaha, qaybta kaalmada adeegyada, ivory gasca. So [facility] [phone].    ATENCIÓN: Si habla español, tiene a ahn disposición servicios gratuitos de asistencia lingüística. Llame al [phone].    We comply with applicable federal civil rights laws and [place] laws. We do not discriminate on the  basis of race, color, national origin, age, disability, sex, sexual orientation, or gender identity.            Thank you!     Thank you for choosing [facility]  for your care. Our goal is always to provide you with excellent care. Hearing back from our patients is one way we can continue to improve our services. Please take a few minutes to complete the written survey that you may receive in the mail after your visit with us. Thank you!             Your Updated Medication List - Protect others around you: Learn how to safely use, store and throw away your medicines at www.disposemymeds.org.          This list is accurate as of 3/13/18 10:49 AM.  Always use your most recent med list.                   Brand Name Dispense Instructions for use Diagnosis    ACE/ARB/ARNI NOT PRESCRIBED (INTENTIONAL)      Please choose reason not prescribed, below    Type 2 diabetes mellitus without complication, without long-term current use of insulin (H)       aspirin 81 MG tablet     90 tablet    Take 1 tablet (81 mg) by mouth daily    Benign essential hypertension       calcium carbonate 600 MG tablet    OS-KATHY 600 mg [place]. Ca    180 tablet    Take 1 tablet (600 mg) by mouth 2 times daily (with meals)    Calcium deficiency, Osteopenia       cholecalciferol 1000 UNIT tablet    vitamin D3    90 tablet    Take 1 tablet (1,000 Units) by mouth daily    Osteopenia       clobetasol 0.05 % external solution    TEMOVATE    100 mL    Apply sparingly to affected area twice daily for 14 days.  Do not apply to face.    Dermatitis, seborrheic       fish oil-omega-3 fatty acids 1000 MG capsule      Take 1 capsule by mouth 2 times daily.        fluocinolone 0.01 % cream    SYNALAR    30 g    Apply topically 2 times daily    Seborrheic dermatitis       furosemide 20 MG tablet    LASIX    90 tablet    Take 1 tablet (20 mg) by mouth daily    Edema, unspecified type, Type 2 diabetes mellitus without complication, without long-term  current use of insulin (H)       ketoconazole 2 % shampoo    NIZORAL    240 mL    Apply to the affected area and wash off after 5 minutes. Use 2-3 times per week.    Dermatitis, seborrheic       levothyroxine 112 MCG tablet    SYNTHROID/LEVOTHROID    14 tablet    TAKE 1 TABLET BY MOUTH ONCE DAILY --NEEDS  OFFICE  VISIT    Acquired hypothyroidism       order for DME     1 Units    Equipment being ordered: Diabetic shoes    Type 2 diabetes mellitus without complication, without long-term current use of insulin (H)

## 2018-03-13 NOTE — PROGRESS NOTES
SUBJECTIVE:   Adela Means is a 89 year old female who presents to clinic today for the following health issues:      Diabetes Follow-up      Patient is checking blood sugars: not at all    Diabetic concerns: None     Symptoms of hypoglycemia (low blood sugar): none     Paresthesias (numbness or burning in feet) or sores: Yes      Date of last diabetic eye exam: Due    Hyperlipidemia Follow-Up      Rate your low fat/cholesterol diet?: good    Taking statin?  No    Other lipid medications/supplements?:  Fish oil/Omega 3 without side effects    Hypertension Follow-up      Outpatient blood pressures are not being checked.    Low Salt Diet: no added salt    BP Readings from Last 2 Encounters:   03/13/18 136/74   03/07/17 138/70     Hemoglobin A1C (%)   Date Value   02/25/2017 7.3 (H)     LDL Cholesterol Calculated (mg/dL)   Date Value   02/24/2016 81   08/05/2013 72     Hypothyroidism Follow-up      Since last visit, patient describes the following symptoms: Weight stable, no hair loss, no skin changes, no constipation, no loose stools      Amount of exercise or physical activity: Active daily     Problems taking medications regularly: No    Medication side effects: none    Diet: low salt, low fat/cholesterol and diabetic    HPI:   PCP:  Yoselyn Castrejon, [phone]    Patient Active Problem List   Diagnosis     Acquired hypothyroidism     Benign essential hypertension, BP <140/90     Knee pain     Hepatic cyst     Hypertriglyceridemia, LDL goal <100     Osteopenia     DJD (degenerative joint disease)     Gastritis     Advanced directives, counseling/discussion     Seborrheic dermatitis     Type 2 diabetes mellitus without complication, without long-term current use of insulin (H), HgbA1c goal <8     Current Outpatient Prescriptions   Medication     levothyroxine (SYNTHROID/LEVOTHROID) 112 MCG tablet     aspirin 81 MG tablet     clobetasol (TEMOVATE) 0.05 % external solution     furosemide (LASIX) 20 MG  "tablet     order for DME     cholecalciferol (VITAMIN D) 1000 UNIT tablet     ACE/ARB NOT PRESCRIBED, INTENTIONAL,     calcium carbonate (OS-KATHY 600 MG [place]. [place]) 600 MG TABS tablet     ketoconazole (NIZORAL) 2 % shampoo     fish oil-omega-3 fatty acids 1000 MG capsule     [DISCONTINUED] furosemide (LASIX) 20 MG tablet     fluocinolone (SYNALAR) 0.01 % cream     No current facility-administered medications for this visit.        Health Maintenance Due   Topic Date Due     FOOT EXAM Q1 YEAR  03/10/1928     EYE EXAM Q1 YEAR  03/10/1928     ADVANCE DIRECTIVE PLANNING Q5 YRS  02/21/2017     LIPID MONITORING Q1 YEAR  02/24/2017     A1C Q6 MO  08/25/2017     TSH Q1 YEAR  02/24/2018     CREATININE Q1 YEAR  02/24/2018     MICROALBUMIN Q1 YEAR  03/07/2018     FALL RISK ASSESSMENT  02/24/2018       Reviewed and updated:  Tobacco  Allergies  Meds  Med Hx  Surg Hx  Fam Hx  Soc Hx     ROS:  Constitutional, HEENT, cardiovascular, pulmonary, gi and gu systems are negative, except as otherwise noted.    PHYSICAL EXAM:   /74 (BP Location: Right arm, Patient Position: Sitting, Cuff Size: Adult Large)  Pulse 64  Temp 98.1  F (36.7  C) (Tympanic)  Resp 20  Ht 5' 5.5\" (1.664 m)  Wt 209 lb (94.8 kg)  BMI 34.25 kg/m2  Body mass index is 34.25 kg/(m^2).  GENERAL APPEARANCE: healthy, alert and no distress  NECK: no adenopathy, no asymmetry, masses, or scars and thyroid normal to palpation  RESP: lungs clear to auscultation - no rales, rhonchi or wheezes  CV: regular rates and rhythm, normal S1 S2, no S3 or S4 and no murmur, click or rub  MS: extremities normal- no gross deformities noted  SKIN: no suspicious lesions or rashes  DIABETIC FOOT EXAM: normal DP and PT pulses, no trophic changes or ulcerative lesions, normal monofilament exam, except for findings noted on diabetic foot graphical image.  Dry cracking heels  RIGHT: 10, 1,2,3,8,9  LEFT: 10, 1,2,4           PSYCH: mentation appears normal and affect " normal/bright    ASSESSMENT & PLAN:     1. Type 2 diabetes mellitus without complication, without long-term current use of insulin (H), HgbA1c goal <8  Chronic, stable  - FOOT EXAM  - Albumin Random Urine Quantitative with Creat Ratio  - Comprehensive metabolic panel (BMP + Alb, Alk Phos, ALT, AST, Total. Bili, TP)  - Wear slippers at home  - Get foot care regular  - Recheck every 6 months  - Continue eating healthy and using bicycle. GREAT!  - Prescription for diabetic  Lab Results   Component Value Date    A1C 7.3 02/25/2017       2. Benign essential hypertension. BP goal <140/90  Chronic, stable  - aspirin 81 MG tablet; Take 1 tablet (81 mg) by mouth daily  Dispense: 90 tablet; Refill: 3  - furosemide (LASIX) 20 MG tablet; Take 1 tablet (20 mg) by mouth daily  Dispense: 90 tablet; Refill: 3  BP Readings from Last 6 Encounters:   03/13/18 136/74   03/07/17 138/70   02/24/17 126/84   01/27/16 134/72   07/28/15 130/82   06/08/15 151/67     3. Dermatitis, seborrheic  Chronic, stable  - clobetasol (TEMOVATE) 0.05 % external solution; Apply sparingly to affected area twice daily for 14 days.  Do not apply to face.  Dispense: 100 mL; Refill: 1    4. Edema, unspecified type  Chronic, stable  - furosemide (LASIX) 20 MG tablet; Take 1 tablet (20 mg) by mouth daily  Dispense: 90 tablet; Refill: 3    5. Acquired hypothyroidism  Chronic, stable  - TSH with free T4 reflex  - Will refill Levothyroxine when lab comes in  TSH   Date Value Ref Range Status   02/24/2017 3.34 0.40 - 4.00 mU/L Final   01/27/2016 3.43 0.40 - 4.00 mU/L Final   06/05/2015 3.58 0.40 - 4.00 mU/L Final   04/03/2015 6.18 (H) 0.40 - 4.00 mU/L Final     Comment:     Effective 7/30/2014, the reference range for this assay has changed to reflect   new instrumentation/methodology.     01/23/2015 4.74 (H) 0.40 - 4.00 mU/L Final     Comment:     Effective 7/30/2014, the reference range for this assay has changed to reflect   new instrumentation/methodology.        6. Hypertriglyceridemia, LDL goal <100  Chronic, stable  - Lipid panel reflex to direct LDL Fasting  LDL Cholesterol Calculated   Date Value Ref Range Status   02/24/2016 81 <100 mg/dL Final     Comment:     Desirable:       <100 mg/dl   08/05/2013 72 0 - 129 mg/dL Final     Comment:     LDL Cholesterol is the primary guide to therapy: LDL-cholesterol goal in high   risk patients is <100 mg/dL and in very high risk patients is <70 mg/dL.       Diet: Diabetes  Food is an important tool that you can use to control diabetes and stay healthy. Eating well-balanced meals in the correct amounts will help you control your blood glucose levels and prevent low blood sugar reactions. It will also help you reduce the health risks of diabetes. There is no one specific diet that is right for everyone with diabetes. But there are general guidelines to follow. A registered dietitian (RD) will create a tailored diet approach that s just right for you. He or she will also help you plan healthy meals and snacks. If you have any questions, call your dietitian for advice.     Guidelines for success  Talk with your healthcare provider before starting a diabetes diet or weight loss program. If you haven't talked with a dietitian yet, ask your provider for a referral. The following guidelines can help you succeed:    Select foods from the 6 food groups below. Your dietitian will help you find food choices within each group. He or she will also show you serving sizes and how many servings you can have at each meal.    Grains, beans, and starchy vegetables    Vegetables    Fruit    Milk or yogurt    Meat, poultry, fish, or tofu    Healthy fats    Check your blood sugar levels as directed by your provider. Take any medicine as prescribed by your provider.    Learn to read food labels and pick the right portion sizes.    Eat only the amount of food in your meal plan. Eat about the same amount of food at regular times each day. Don t skip  meals. Eat meals 4 to 5 hours apart, with snacks in between.    Limit alcohol. It raises blood sugar levels. Drink water or calorie-free diet drinks that use safe sweeteners.    Eat less fat to help lower your risk of heart disease. Use nonfat or low-fat dairy products and lean meats. Avoid fried foods. Use cooking oils that are unsaturated, such as olive, canola, or peanut oil.    Talk with your dietitian about safe sugar substitutes.    Avoid added salt. It can contribute to high blood pressure, which can cause heart disease. People with diabetes already have a risk of high blood pressure and heart disease.    Stay at a healthy weight. If you need to lose weight, cut down on your portion sizes. But don t skip meals. Exercise is an important part of any weight management program. Talk with your provider about an exercise program that s right for you.    For more information about the best diet plan for you, talk with a registered dietitian (RD). To find an RD in your area, contact:    Academy of Nutrition and Dietetics www.eatright.org    The American Diabetes Association 595-206-1953 www.diabetes.org  Date Last Reviewed: 8/1/2016 2000-2017 [employer]. [address] [place] [address], [place] [address]. All rights reserved. This information is not intended as a substitute for professional medical care. Always follow your healthcare professional's instructions.          Risks, benefits, side effects and rationale for treatment plan fully discussed with the patient and understanding expressed.    Yoselyn Castrejon, [facility]

## 2018-03-13 NOTE — LETTER
March 14, 2018      Adela Means  [address] [facility] [address]        Dear ,    We are writing to inform you of your test results.    TSH- normal. Levothyroxine refilled at this dose for a year.   Lipids- Triglycerides came down a little, but still elevated. I ordered an increase to Omega 3 fatty acid- Take 2,000 mg twice daily. New Rx sent to pharmacy. If this turns out to be too expensive, buy over-the-counter Nature [employer] brand instead.   CMP- normal   Microalbumin in urine- this is elevated, but has come down a little.     Resulted Orders   TSH with free T4 reflex   Result Value Ref Range    TSH 3.36 0.40 - 4.00 mU/L   Lipid panel reflex to direct LDL Fasting   Result Value Ref Range    Cholesterol 146 <200 mg/dL    Triglycerides 165 (H) <150 mg/dL      Comment:      Borderline high:  150-199 mg/dl  High:             200-499 mg/dl  Very high:       >499 mg/dl      HDL Cholesterol 40 (L) >49 mg/dL    LDL Cholesterol Calculated 73 <100 mg/dL      Comment:      Desirable:       <100 mg/dl    Non HDL Cholesterol 106 <130 mg/dL   Albumin Random Urine Quantitative with Creat Ratio   Result Value Ref Range    Creatinine Urine 107 mg/dL    Albumin Urine mg/L 55 mg/L    Albumin Urine mg/g Cr 51.68 (H) 0 - 25 mg/g Cr   Comprehensive metabolic panel (BMP + Alb, Alk Phos, ALT, AST, Total. Bili, TP)   Result Value Ref Range    Sodium 133 133 - 144 mmol/L    Potassium 4.2 3.4 - 5.3 mmol/L    Chloride 99 94 - 109 mmol/L    Carbon Dioxide 27 20 - 32 mmol/L    Anion Gap 7 3 - 14 mmol/L    Glucose 173 (H) 70 - 99 mg/dL    Urea Nitrogen 17 7 - 30 mg/dL    Creatinine 0.72 0.52 - 1.04 mg/dL    GFR Estimate 76 >60 mL/min/1.7m2      Comment:      Non  GFR Calc    GFR Estimate If Black >90 >60 mL/min/1.7m2      Comment:       GFR Calc    Calcium 8.9 8.5 - 10.1 mg/dL    Bilirubin Total 0.6 0.2 - 1.3 mg/dL    Albumin 3.8 3.4 - 5.0 g/dL    Protein Total 7.7 6.8 - 8.8 g/dL    Alkaline  Phosphatase 57 40 - 150 U/L    ALT 38 0 - 50 U/L    AST 28 0 - 45 U/L       If you have any questions or concerns, please call the clinic at the number listed above.       Sincerely,        Yoselyn Castrejon, CNP

## 2018-03-13 NOTE — NURSING NOTE
"Chief Complaint   Patient presents with     Diabetes     Hypertension     Lipids     Thyroid Problem       Initial /74 (BP Location: Right arm, Patient Position: Sitting, Cuff Size: Adult Large)  Pulse 64  Temp 98.1  F (36.7  C) (Tympanic)  Resp 20  Ht 5' 5.5\" (1.664 m)  Wt 209 lb (94.8 kg)  BMI 34.25 kg/m2 Estimated body mass index is 34.25 kg/(m^2) as calculated from the following:    Height as of this encounter: 5' 5.5\" (1.664 m).    Weight as of this encounter: 209 lb (94.8 kg).      Health Maintenance that is potentially due pending provider review:  NONE    n/a    Is there anyone who you would like to be able to receive your results? Not Applicable  If yes have patient fill out MIREYA    "

## 2018-03-14 DIAGNOSIS — E03.9 ACQUIRED HYPOTHYROIDISM: ICD-10-CM

## 2018-03-14 DIAGNOSIS — E78.1 HYPERTRIGLYCERIDEMIA: Primary | Chronic | ICD-10-CM

## 2018-03-14 RX ORDER — OMEGA-3-ACID ETHYL ESTERS 1 G/1
2 CAPSULE, LIQUID FILLED ORAL 2 TIMES DAILY
Qty: 120 CAPSULE | Refills: 11 | Status: SHIPPED | OUTPATIENT
Start: 2018-03-14 | End: 2019-04-09

## 2018-03-14 RX ORDER — LEVOTHYROXINE SODIUM 112 UG/1
TABLET ORAL
Qty: 90 TABLET | Refills: 3 | Status: SHIPPED | OUTPATIENT
Start: 2018-03-14 | End: 2019-03-20

## 2018-03-14 NOTE — PROGRESS NOTES
TSH- normal. Levothyroxine refilled at this dose for a year.  Lipids- Triglycerides came down a little, but still elevated. I ordered an increase to Omega 3 fatty acid- Take 2,000 mg twice daily. New Rx sent to pharmacy. If this turns out to be too expensive, buy over-the-counter [employer] brand instead.  CMP- normal  Microalbumin in urine- this is elevated, but has come down a little.    Please notify her of these results and send a hard copy if not on [facility].   Thanks. EVELIA Buckley

## 2018-04-16 DIAGNOSIS — M85.80 OSTEOPENIA: ICD-10-CM

## 2018-04-17 NOTE — TELEPHONE ENCOUNTER
"Requested Prescriptions   Pending Prescriptions Disp Refills     cholecalciferol (VITAMIN D3) 1000 UNIT tablet [Pharmacy Med Name: VITAMIN D3(KEITH) 1,000 IU TAB] 30 tablet 5     Sig: TAKE ONE TABLET BY MOUTH ONCE DAILY    Vitamin Supplements (Adult) Protocol Passed    4/16/2018  5:28 PM       Passed - High dose Vitamin D not ordered       Passed - Recent (12 mo) or future (30 days) visit within the authorizing provider's specialty    Patient had office visit in the last 12 months or has a visit in the next 30 days with authorizing provider or within the authorizing provider's specialty.  See \"Patient Info\" tab in inbasket, or \"Choose Columns\" in Meds & Orders section of the refill encounter.            Last Written Prescription Date:  3/30/17  Last Fill Quantity: 90,  # refills: 1   Last office visit: 10/23/2014 with prescribing provider:  3/13/18   Future Office Visit:      "

## 2018-05-07 ENCOUNTER — TELEPHONE (OUTPATIENT)
Dept: FAMILY MEDICINE | Facility: CLINIC | Age: 83
End: 2018-05-07

## 2018-05-07 NOTE — TELEPHONE ENCOUNTER
Received certificate of medical necessity from [employer] for patient's diabetic shoes. Given to Yoselyn to sign.    Mela Levy[facility] [place]

## 2018-11-05 DIAGNOSIS — L21.9 DERMATITIS, SEBORRHEIC: ICD-10-CM

## 2018-11-05 RX ORDER — KETOCONAZOLE 20 MG/ML
SHAMPOO TOPICAL
Qty: 240 ML | Refills: 0 | Status: SHIPPED | OUTPATIENT
Start: 2018-11-05 | End: 2019-04-09

## 2018-11-05 NOTE — TELEPHONE ENCOUNTER
"Requested Prescriptions   Pending Prescriptions Disp Refills     ketoconazole (NIZORAL) 2 % shampoo [Pharmacy Med Name: KETOCONAZOLE 2%     SHA]  4     Sig: APPLY TO AFFECTED AREA(S) AND WASH OFF AFTER 5 MINUTES USE 2 TO 3 TIMES PER WEEK    Antifungal Agents Passed    11/5/2018  3:49 PM       Passed - Recent (12 mo) or future (30 days) visit within the authorizing provider's specialty    Patient had office visit in the last 12 months or has a visit in the next 30 days with authorizing provider or within the authorizing provider's specialty.  See \"Patient Info\" tab in inbasket, or \"Choose Columns\" in Meds & Orders section of the refill encounter.             Passed - Not Fluconazole or Terconazole     If oral Fluconazole or Terconazole, may refill if indicated in progress notes.             "

## 2019-04-04 ENCOUNTER — ALLIED HEALTH/NURSE VISIT (OUTPATIENT)
Dept: FAMILY MEDICINE | Facility: CLINIC | Age: 84
End: 2019-04-04
Payer: COMMERCIAL

## 2019-04-04 ENCOUNTER — TELEPHONE (OUTPATIENT)
Dept: FAMILY MEDICINE | Facility: CLINIC | Age: 84
End: 2019-04-04

## 2019-04-04 DIAGNOSIS — H61.23 BILATERAL IMPACTED CERUMEN: Primary | ICD-10-CM

## 2019-04-04 PROCEDURE — 99207 ZZC NO CHARGE NURSE ONLY: CPT

## 2019-04-04 NOTE — PROGRESS NOTES
Adela Means is a 89 year old female who presents in clinic for possible impacted cerumen.    SYMPTOMS:  Onset of symptoms: ,1day ago with fairly sudden onset diminished hearing.    Hx of cerumen impaction?   Yes  Hx of surgery or rupture?  No (if yes, huddle with provider)  OBJECTIVE:  Patient appears in no distress  HEENT: both sides ear canal occluded with cerumen.      PLAN:  Cerumenosis is noted.  Wax is removed by syringing and manual debridement. Instructions for home care to prevent wax buildup are given, including OTC debrox.    Tilt head sideways and instill 5-10 drops twice daily up to 4 days, tip of applicator should not enter ear canal; keep drops in ear for several minutes by keeping head tilted and placing cotton in ear.    NURSING PLAN: Huddle with provider, plan includes bilat ear flush today    RECOMMENDED DISPOSITION: F/U as needed- appt with PCP 04-09-19.    Will comply with recommendation: Yes  Rosanna Keith RN

## 2019-04-04 NOTE — TELEPHONE ENCOUNTER
Hx cerumen, dtr usually is able to remove wax after ear drops. Unable to remove wax bilat ears this time, pt unable to hear. Denies pain, pressure.  No hearing aids.  Last OV 03-13-18. RN discussed with Yoselyn who approves nurse visit for ear check today, scheduled with PCP next week, fasting for lab.  Dtr informed, appts scheduled.  SPENCER Keith RN

## 2019-04-04 NOTE — TELEPHONE ENCOUNTER
Patient's daughter is calling stating Adela's ears are plugged and she cannot hear a thing. Can't get in until tomorrow at 3:00 and daughter is concerned because she cannot hear. Doesn't want to wait that long.    Mela [facility] [place]

## 2019-04-04 NOTE — PROGRESS NOTES
SUBJECTIVE:   Adela Means is a 89 year old female who presents to clinic today for the following health issues:    Accompanied by daughter    Diabetes Follow-up      Patient is checking blood sugars: not at all    Diabetic concerns: None     Symptoms of hypoglycemia (low blood sugar): none     Paresthesias (numbness or burning in feet) or sores: Yes When the weather changes      Date of last diabetic eye exam: Due    Diabetes Management Resources    Hyperlipidemia Follow-Up      Rate your low fat/cholesterol diet?: not monitoring fat    Taking statin?  No    Other lipid medications/supplements?:  none    Hypertension Follow-up      Outpatient blood pressures are not being checked.    Low Salt Diet: not monitoring salt    BP Readings from Last 2 Encounters:   04/09/19 138/74   03/13/18 136/74     Hemoglobin A1C (%)   Date Value   02/25/2017 7.3 (H)     LDL Cholesterol Calculated (mg/dL)   Date Value   03/13/2018 73   02/24/2016 81       Amount of exercise or physical activity: None    Problems taking medications regularly: No    Medication side effects: none    Diet: regular (no restrictions)      TSH   Date Value Ref Range Status   03/13/2018 3.36 0.40 - 4.00 mU/L Final   02/24/2017 3.34 0.40 - 4.00 mU/L Final   01/27/2016 3.43 0.40 - 4.00 mU/L Final   06/05/2015 3.58 0.40 - 4.00 mU/L Final   04/03/2015 6.18 (H) 0.40 - 4.00 mU/L Final     Comment:     Effective 7/30/2014, the reference range for this assay has changed to reflect   new instrumentation/methodology.           PCP   Yoselyn Castrejon, [facility] [phone]    Health Maintenance        Health Maintenance Due   Topic Date Due     EYE EXAM Q1 YEAR  03/10/1928     MEDICARE ANNUAL WELLNESS VISIT  [birth year]/10/1992     ZOSTER IMMUNIZATION (2 of 3) 11/06/2015     PHQ-2 Q1 YR  01/27/2017     ADVANCE DIRECTIVE PLANNING Q5 YRS  02/21/2017     A1C Q6 MO  08/25/2017     FALL RISK ASSESSMENT  02/24/2018     DTAP/TDAP/TD IMMUNIZATION (3 - Td) 10/01/2018     FOOT EXAM  Q1 YEAR  03/13/2019     TSH Q1 YEAR  03/13/2019     CREATININE Q1 YEAR  03/13/2019     LIPID MONITORING Q1 YEAR  03/13/2019     MICROALBUMIN Q1 YEAR  03/13/2019       HPI        Patient Active Problem List   Diagnosis     Acquired hypothyroidism     Benign essential hypertension, BP <140/90     Knee pain     Hepatic cyst     Hypertriglyceridemia, LDL goal <100     Osteopenia     DJD (degenerative joint disease)     Gastritis     Advanced directives, counseling/discussion     Seborrheic dermatitis     Type 2 diabetes mellitus without complication, without long-term current use of insulin (H), HgbA1c goal <8     Current Outpatient Medications   Medication     ACE/ARB NOT PRESCRIBED, INTENTIONAL,     aspirin (ASA) 81 MG tablet     calcium carbonate (OS-KATHY 600 MG [place]. [place]) 600 MG tablet     furosemide (LASIX) 20 MG tablet     ketoconazole (NIZORAL) 2 % external shampoo     levothyroxine (SYNTHROID/LEVOTHROID) 112 MCG tablet     omega-3 acid ethyl esters (LOVAZA) 1 g capsule     VITAMIN D3 1000 units tablet     clobetasol (TEMOVATE) 0.05 % external solution     fluocinolone (SYNALAR) 0.01 % cream     order for DME     No current facility-administered medications for this visit.        Reviewed and updated:  Tobacco  Allergies  Meds  Med Hx  Surg Hx  Fam Hx  Soc Hx     ROS:  Constitutional, HEENT, cardiovascular, pulmonary, gi and gu systems are negative, except as otherwise noted.  Skin- dry dermatitis to scalp    PHYSICAL EXAM   /74 (BP Location: Right arm, Patient Position: Sitting, Cuff Size: Adult Large)   Pulse 64   Temp 97.8  F (36.6  C) (Tympanic)   Resp 20   Wt 91.6 kg (202 lb)   SpO2 94%   BMI 33.10 kg/m    Body mass index is 33.1 kg/m .  GENERAL APPEARANCE: healthy, alert and no distress  NECK: no adenopathy, no asymmetry, masses, or scars and thyroid normal to palpation  RESP: lungs clear to auscultation - no rales, rhonchi or wheezes  CV: regular rates and rhythm, normal S1 S2, no S3 or S4  and no murmur, click or rub  MS: extremities normal- no gross deformities noted  SKIN: no suspicious lesions or rashes  DIABETIC FOOT EXAM: normal DP and PT pulses, no trophic changes or ulcerative lesions, normal sensory exam and normal monofilament exam, except for findings noted on diabetic foot graphical image.    RIGHT: 10, 2, 9  LEFT: 10, 3, 8, 9  Dry skin        PSYCH: mentation appears normal and affect normal/bright    ASSESSMENT & PLAN     1. Type 2 diabetes mellitus without complication, without long-term current use of insulin (H), HgbA1c goal <8  Chronic, stable  - Albumin Random Urine Quantitative with Creat Ratio  - C FOOT EXAM  NO CHARGE  - Hemoglobin A1c  - Vitamin B12  - aspirin (ASA) 81 MG tablet; Take 1 tablet (81 mg) by mouth daily  Dispense: 90 tablet; Refill: 3  For diabetic foot care call: Deanna Kelley RN at [facility] [phone]    2. Benign essential hypertension, BP <140/90  Chronic, stable  - Comprehensive metabolic panel    3. Hypertriglyceridemia, LDL goal <100  Chronic, stable  - Lipid panel reflex to direct LDL Fasting  - omega-3 acid ethyl esters (LOVAZA) 1 g capsule; Take 2 capsules (2 g) by mouth 2 times daily  Dispense: 120 capsule; Refill: 11    4. Acquired hypothyroidism  Chronic, stable  - TSH with free T4 reflex  - levothyroxine (SYNTHROID/LEVOTHROID) 112 MCG tablet; TAKE 1 TABLET BY MOUTH ONCE DAILY IN THE MORNING ON EMPTY STOMACH, ONE HOUR BEFORE OTHER MEDICATIONS.  Dispense: 90 tablet; Refill: 3    5. Edema, unspecified type  Chronic, stable  - furosemide (LASIX) 20 MG tablet; Take 1 tablet (20 mg) by mouth daily  Dispense: 90 tablet; Refill: 3    6. Calcium deficiency  Historical   - calcium carbonate (OS-KATHY 600 MG [place]. [place]) 600 MG tablet; Take 1 tablet (600 mg) by mouth 2 times daily (with meals)  Dispense: 180 tablet; Refill: 3    7. Dermatitis, seborrheic  Chronic, stable  - ketoconazole (NIZORAL) 2 % external shampoo; APPLY TO AFFECTED AREA(S) AND WASH OFF AFTER 5  MINUTES USE 2 TO 3 TIMES PER WEEK  Dispense: 120 mL; Refill: 2    8. Osteopenia, unspecified location  Chronic, stable  - calcium carbonate (OS-KATHY 600 MG [place]. [place]) 600 MG tablet; Take 1 tablet (600 mg) by mouth 2 times daily (with meals)  Dispense: 180 tablet; Refill: 3      Risks, benefits, side effects and rationale for treatment plan fully discussed with the patient and understanding expressed.    Yoselyn Castrejon, EVELIA-BC  [facility]

## 2019-04-09 ENCOUNTER — OFFICE VISIT (OUTPATIENT)
Dept: FAMILY MEDICINE | Facility: CLINIC | Age: 84
End: 2019-04-09
Payer: COMMERCIAL

## 2019-04-09 VITALS
OXYGEN SATURATION: 94 % | WEIGHT: 202 LBS | HEART RATE: 64 BPM | DIASTOLIC BLOOD PRESSURE: 74 MMHG | SYSTOLIC BLOOD PRESSURE: 138 MMHG | BODY MASS INDEX: 33.1 KG/M2 | RESPIRATION RATE: 20 BRPM | TEMPERATURE: 97.8 F

## 2019-04-09 DIAGNOSIS — E78.1 HYPERTRIGLYCERIDEMIA: Chronic | ICD-10-CM

## 2019-04-09 DIAGNOSIS — M85.80 OSTEOPENIA, UNSPECIFIED LOCATION: ICD-10-CM

## 2019-04-09 DIAGNOSIS — E03.9 ACQUIRED HYPOTHYROIDISM: Chronic | ICD-10-CM

## 2019-04-09 DIAGNOSIS — L21.9 DERMATITIS, SEBORRHEIC: ICD-10-CM

## 2019-04-09 DIAGNOSIS — E58 CALCIUM DEFICIENCY: ICD-10-CM

## 2019-04-09 DIAGNOSIS — I10 BENIGN ESSENTIAL HYPERTENSION: Chronic | ICD-10-CM

## 2019-04-09 DIAGNOSIS — E11.9 TYPE 2 DIABETES MELLITUS WITHOUT COMPLICATION, WITHOUT LONG-TERM CURRENT USE OF INSULIN (H): Primary | Chronic | ICD-10-CM

## 2019-04-09 DIAGNOSIS — R60.9 EDEMA, UNSPECIFIED TYPE: ICD-10-CM

## 2019-04-09 LAB
ALBUMIN SERPL-MCNC: 3.9 G/DL (ref 3.4–5)
ALP SERPL-CCNC: 73 U/L (ref 40–150)
ALT SERPL W P-5'-P-CCNC: 41 U/L (ref 0–50)
ANION GAP SERPL CALCULATED.3IONS-SCNC: 9 MMOL/L (ref 3–14)
AST SERPL W P-5'-P-CCNC: 32 U/L (ref 0–45)
BILIRUB SERPL-MCNC: 0.7 MG/DL (ref 0.2–1.3)
BUN SERPL-MCNC: 20 MG/DL (ref 7–30)
CALCIUM SERPL-MCNC: 9.1 MG/DL (ref 8.5–10.1)
CHLORIDE SERPL-SCNC: 94 MMOL/L (ref 94–109)
CHOLEST SERPL-MCNC: 175 MG/DL
CO2 SERPL-SCNC: 25 MMOL/L (ref 20–32)
CREAT SERPL-MCNC: 0.8 MG/DL (ref 0.52–1.04)
GFR SERPL CREATININE-BSD FRML MDRD: 64 ML/MIN/{1.73_M2}
GLUCOSE SERPL-MCNC: 342 MG/DL (ref 70–99)
HBA1C MFR BLD: 12.5 % (ref 0–5.6)
HDLC SERPL-MCNC: 46 MG/DL
LDLC SERPL CALC-MCNC: 89 MG/DL
NONHDLC SERPL-MCNC: 129 MG/DL
POTASSIUM SERPL-SCNC: 4.3 MMOL/L (ref 3.4–5.3)
PROT SERPL-MCNC: 8 G/DL (ref 6.8–8.8)
SODIUM SERPL-SCNC: 128 MMOL/L (ref 133–144)
T4 FREE SERPL-MCNC: 1.64 NG/DL (ref 0.76–1.46)
TRIGL SERPL-MCNC: 201 MG/DL
TSH SERPL DL<=0.005 MIU/L-ACNC: 8.03 MU/L (ref 0.4–4)
VIT B12 SERPL-MCNC: 750 PG/ML (ref 193–986)

## 2019-04-09 PROCEDURE — 80053 COMPREHEN METABOLIC PANEL: CPT | Performed by: NURSE PRACTITIONER

## 2019-04-09 PROCEDURE — 83036 HEMOGLOBIN GLYCOSYLATED A1C: CPT | Performed by: NURSE PRACTITIONER

## 2019-04-09 PROCEDURE — 82607 VITAMIN B-12: CPT | Performed by: NURSE PRACTITIONER

## 2019-04-09 PROCEDURE — 84443 ASSAY THYROID STIM HORMONE: CPT | Performed by: NURSE PRACTITIONER

## 2019-04-09 PROCEDURE — 80061 LIPID PANEL: CPT | Performed by: NURSE PRACTITIONER

## 2019-04-09 PROCEDURE — 99207 C FOOT EXAM  NO CHARGE: CPT | Performed by: NURSE PRACTITIONER

## 2019-04-09 PROCEDURE — 84439 ASSAY OF FREE THYROXINE: CPT | Performed by: NURSE PRACTITIONER

## 2019-04-09 PROCEDURE — 99214 OFFICE O/P EST MOD 30 MIN: CPT | Performed by: NURSE PRACTITIONER

## 2019-04-09 PROCEDURE — 36415 COLL VENOUS BLD VENIPUNCTURE: CPT | Performed by: NURSE PRACTITIONER

## 2019-04-09 RX ORDER — FUROSEMIDE 20 MG
20 TABLET ORAL DAILY
Qty: 90 TABLET | Refills: 3 | Status: SHIPPED | OUTPATIENT
Start: 2019-04-09 | End: 2020-04-28

## 2019-04-09 RX ORDER — LEVOTHYROXINE SODIUM 112 UG/1
TABLET ORAL
Qty: 90 TABLET | Refills: 3 | Status: SHIPPED | OUTPATIENT
Start: 2019-04-09 | End: 2020-04-26

## 2019-04-09 RX ORDER — PHENOL 1.4 %
1 AEROSOL, SPRAY (ML) MUCOUS MEMBRANE 2 TIMES DAILY WITH MEALS
Qty: 180 TABLET | Refills: 3 | Status: SHIPPED | OUTPATIENT
Start: 2019-04-09 | End: 2020-05-15

## 2019-04-09 RX ORDER — KETOCONAZOLE 20 MG/ML
SHAMPOO TOPICAL
Qty: 120 ML | Refills: 2 | Status: SHIPPED | OUTPATIENT
Start: 2019-04-09 | End: 2020-06-15

## 2019-04-09 RX ORDER — OMEGA-3-ACID ETHYL ESTERS 1 G/1
2 CAPSULE, LIQUID FILLED ORAL 2 TIMES DAILY
Qty: 120 CAPSULE | Refills: 11 | Status: SHIPPED | OUTPATIENT
Start: 2019-04-09 | End: 2020-06-01

## 2019-04-09 NOTE — PATIENT INSTRUCTIONS
1. Type 2 diabetes mellitus without complication, without long-term current use of insulin (H), HgbA1c goal <8  Chronic, stable  - Albumin Random Urine Quantitative with Creat Ratio  - C FOOT EXAM  NO CHARGE  - Hemoglobin A1c  - Vitamin B12  - aspirin (ASA) 81 MG tablet; Take 1 tablet (81 mg) by mouth daily  Dispense: 90 tablet; Refill: 3  For diabetic foot care call: Deanna Kelley RN at [facility] [phone]    2. Benign essential hypertension, BP <140/90  Chronic, stable  - Comprehensive metabolic panel    3. Hypertriglyceridemia, LDL goal <100  Chronic, stable  - Lipid panel reflex to direct LDL Fasting  - omega-3 acid ethyl esters (LOVAZA) 1 g capsule; Take 2 capsules (2 g) by mouth 2 times daily  Dispense: 120 capsule; Refill: 11    4. Acquired hypothyroidism  Chronic, stable  - TSH with free T4 reflex  - levothyroxine (SYNTHROID/LEVOTHROID) 112 MCG tablet; TAKE 1 TABLET BY MOUTH ONCE DAILY IN THE MORNING ON EMPTY STOMACH, ONE HOUR BEFORE OTHER MEDICATIONS.  Dispense: 90 tablet; Refill: 3    5. Edema, unspecified type  Chronic, stable  - furosemide (LASIX) 20 MG tablet; Take 1 tablet (20 mg) by mouth daily  Dispense: 90 tablet; Refill: 3    6. Calcium deficiency  Historical   - calcium carbonate (OS-KATHY 600 MG [place]. [place]) 600 MG tablet; Take 1 tablet (600 mg) by mouth 2 times daily (with meals)  Dispense: 180 tablet; Refill: 3    7. Dermatitis, seborrheic  Chronic, stable  - ketoconazole (NIZORAL) 2 % external shampoo; APPLY TO AFFECTED AREA(S) AND WASH OFF AFTER 5 MINUTES USE 2 TO 3 TIMES PER WEEK  Dispense: 120 mL; Refill: 2    8. Osteopenia, unspecified location  Chronic, stable  - calcium carbonate (OS-KATHY 600 MG [place]) 600 MG tablet; Take 1 tablet (600 mg) by mouth 2 times daily (with meals)  Dispense: 180 tablet; Refill: 3

## 2019-04-09 NOTE — NURSING NOTE
"Chief Complaint   Patient presents with     Hypertension     Diabetes     Lipids       Initial /74 (BP Location: Right arm, Patient Position: Sitting, Cuff Size: Adult Large)   Pulse 64   Temp 97.8  F (36.6  C) (Tympanic)   Resp 20   Wt 91.6 kg (202 lb)   SpO2 94%   BMI 33.10 kg/m   Estimated body mass index is 33.1 kg/m  as calculated from the following:    Height as of 3/13/18: 1.664 m (5' 5.5\").    Weight as of this encounter: 91.6 kg (202 lb).    Patient presents to the clinic using [facility] that is potentially due pending provider review:  NONE    n/a    Is there anyone who you would like to be able to receive your results? No  If yes have patient fill out MIREYA    "

## 2019-04-10 DIAGNOSIS — E11.9 TYPE 2 DIABETES MELLITUS WITHOUT COMPLICATION, WITHOUT LONG-TERM CURRENT USE OF INSULIN (H): Chronic | ICD-10-CM

## 2019-04-10 LAB
CREAT UR-MCNC: 34 MG/DL
MICROALBUMIN UR-MCNC: 7 MG/L
MICROALBUMIN/CREAT UR: 19.97 MG/G CR (ref 0–25)

## 2019-04-10 PROCEDURE — 82043 UR ALBUMIN QUANTITATIVE: CPT | Performed by: NURSE PRACTITIONER

## 2019-04-15 NOTE — PROGRESS NOTES
SUBJECTIVE:   Adela Means is a 89 year old female who presents to clinic today for the following   health issues:      REVIEW LAB RESULTS      Diabetes New Diagnosis      Patient is checking blood sugars: not at all    Diabetic concerns: None     Symptoms of hypoglycemia (low blood sugar): none     Paresthesias (numbness or burning in feet) or sores: No     Date of last diabetic eye exam: Due     BP Readings from Last 2 Encounters:   04/16/19 132/74   04/09/19 138/74     Hemoglobin A1C (%)   Date Value   04/09/2019 12.5 (H)   02/25/2017 7.3 (H)     LDL Cholesterol Calculated (mg/dL)   Date Value   04/09/2019 89   03/13/2018 73     Diabetes Management Resources      Hypothyroidism Follow-up    Since last visit, patient describes the following symptoms: Weight stable, no hair loss, no skin changes, no constipation, no loose stools  TSH   Date Value Ref Range Status   04/09/2019 8.03 (H) 0.40 - 4.00 mU/L Final   03/13/2018 3.36 0.40 - 4.00 mU/L Final   02/24/2017 3.34 0.40 - 4.00 mU/L Final   01/27/2016 3.43 0.40 - 4.00 mU/L Final   06/05/2015 3.58 0.40 - 4.00 mU/L Final   Taking Lasix in the morning with Levothyroxine- we'll switch it to alone  Any minor adjustments to Levothryoxine will make her sick      Amount of exercise or physical activity: Rides bike daily     Problems taking medications regularly: No    Medication side effects: none    Diet: regular (no restrictions)        PCP   Yoselyn Castrejon, HUGH [phone]    Health Maintenance        Health Maintenance Due   Topic Date Due     EYE EXAM Q1 YEAR  03/10/1928     MEDICARE ANNUAL WELLNESS VISIT  03/10/1992     ZOSTER IMMUNIZATION (2 of 3) 11/06/2015     ADVANCE DIRECTIVE PLANNING Q5 YRS  02/21/2017     DTAP/TDAP/TD IMMUNIZATION (3 - Td) 10/01/2018       HPI        Patient Active Problem List   Diagnosis     Acquired hypothyroidism     Benign essential hypertension, BP <140/90     Knee pain     Hepatic cyst     Hypertriglyceridemia, LDL goal <100  "    Osteopenia     DJD (degenerative joint disease)     Gastritis     Advanced directives, counseling/discussion     Seborrheic dermatitis     Type 2 diabetes mellitus without complication, without long-term current use of insulin (H), HgbA1c goal <8     Current Outpatient Medications   Medication     ACE/ARB NOT PRESCRIBED, INTENTIONAL,     aspirin (ASA) 81 MG tablet     calcium carbonate (OS-KATHY 600 MG [place]. [place]) 600 MG tablet     clobetasol (TEMOVATE) 0.05 % external solution     fluocinolone (SYNALAR) 0.01 % cream     furosemide (LASIX) 20 MG tablet     ketoconazole (NIZORAL) 2 % external shampoo     levothyroxine (SYNTHROID/LEVOTHROID) 112 MCG tablet     metFORMIN (GLUCOPHAGE) 500 MG tablet     omega-3 acid ethyl esters (LOVAZA) 1 g capsule     order for DME     VITAMIN D3 1000 units tablet     No current facility-administered medications for this visit.        Reviewed and updated:  Tobacco  Allergies  Meds  Med Hx  Surg Hx  Fam Hx  Soc Hx     ROS:  Constitutional, HEENT, cardiovascular, pulmonary, gi and gu systems are negative, except as otherwise noted.    PHYSICAL EXAM   /74 (BP Location: Right arm, Patient Position: Sitting, Cuff Size: Adult Large)   Pulse 60   Temp 99.1  F (37.3  C) (Tympanic)   Resp 20   Ht 1.664 m (5' 5.5\")   Wt 91.6 kg (202 lb)   SpO2 94%   BMI 33.10 kg/m    Body mass index is 33.1 kg/m .  GENERAL APPEARANCE: healthy, alert and no distress  HENT: ear canals and TM's normal and nose and mouth without ulcers or lesions  RESP: lungs clear to auscultation - no rales, rhonchi or wheezes  CV: regular rates and rhythm, normal S1 S2, no S3 or S4 and no murmur, click or rub  MS: extremities normal- no gross deformities noted  PSYCH: mentation appears normal and affect normal/bright    ASSESSMENT & PLAN     1. Type 2 diabetes mellitus with hyperglycemia, without long-term current use of insulin (H)  Chronic, uncontrolled  - metFORMIN (GLUCOPHAGE) 500 MG tablet; Take 1 daily at " dinner for 3 weeks, then take 1 tablet twice daily with breakfast and dinner. Recheck HgbA1c in 3 weeks  Dispense: 180 tablet; Refill: 1  -Repeat HgbA1c in 3 months    2. Acquired hypothyroidism  Chronic, uncontrolled  - **TSH with free T4 reflex FUTURE anytime; Future  Take Levothyroxine on empty stomach alone in morning, take Lasix with breakfast  Repeat TSH lab in 3 months    Risks, benefits, side effects and rationale for treatment plan fully discussed with the patient and understanding expressed.    Yoselyn Castrejon, DEVENP-BC  [facility]

## 2019-04-16 ENCOUNTER — OFFICE VISIT (OUTPATIENT)
Dept: FAMILY MEDICINE | Facility: CLINIC | Age: 84
End: 2019-04-16
Payer: COMMERCIAL

## 2019-04-16 VITALS
OXYGEN SATURATION: 94 % | BODY MASS INDEX: 32.47 KG/M2 | DIASTOLIC BLOOD PRESSURE: 74 MMHG | TEMPERATURE: 99.1 F | HEIGHT: 66 IN | WEIGHT: 202 LBS | SYSTOLIC BLOOD PRESSURE: 132 MMHG | RESPIRATION RATE: 20 BRPM | HEART RATE: 60 BPM

## 2019-04-16 DIAGNOSIS — E03.9 ACQUIRED HYPOTHYROIDISM: Chronic | ICD-10-CM

## 2019-04-16 DIAGNOSIS — I10 BENIGN ESSENTIAL HYPERTENSION: ICD-10-CM

## 2019-04-16 DIAGNOSIS — E11.65 TYPE 2 DIABETES MELLITUS WITH HYPERGLYCEMIA, WITHOUT LONG-TERM CURRENT USE OF INSULIN (H): Primary | ICD-10-CM

## 2019-04-16 PROCEDURE — 99213 OFFICE O/P EST LOW 20 MIN: CPT | Performed by: NURSE PRACTITIONER

## 2019-04-16 RX ORDER — HYDROGEN PEROXIDE 2.65 ML/100ML
LIQUID ORAL; TOPICAL
Qty: 30 TABLET | Refills: 11 | Status: SHIPPED | OUTPATIENT
Start: 2019-04-16 | End: 2019-08-23

## 2019-04-16 RX ORDER — LEVOTHYROXINE SODIUM 125 UG/1
TABLET ORAL
Qty: 60 TABLET | Refills: 0 | Status: CANCELLED | OUTPATIENT
Start: 2019-04-16

## 2019-04-16 ASSESSMENT — MIFFLIN-ST. JEOR: SCORE: 1335.08

## 2019-04-16 NOTE — NURSING NOTE
"Chief Complaint   Patient presents with     Diabetes     New daignosis        Initial /74 (BP Location: Right arm, Patient Position: Sitting, Cuff Size: Adult Large)   Pulse 60   Temp 99.1  F (37.3  C) (Tympanic)   Resp 20   Ht 1.664 m (5' 5.5\")   Wt 91.6 kg (202 lb)   SpO2 94%   BMI 33.10 kg/m   Estimated body mass index is 33.1 kg/m  as calculated from the following:    Height as of this encounter: 1.664 m (5' 5.5\").    Weight as of this encounter: 91.6 kg (202 lb).    Patient presents to the clinic using [employer] [facility]    Health Maintenance that is potentially due pending provider review:  NONE    n/a    Is there anyone who you would like to be able to receive your results? No  If yes have patient fill out MIREYA    "

## 2019-04-16 NOTE — LETTER
[facility] [place]  [address] [address]  [phone]        August 20, 2019    Adela Means  [address] [place] [address]              Dear Adela Means    This is to remind you that you have non-fasting labs due.    You may call our office at [phone] to schedule an appointment.    Please disregard this notice if you have already had your labs drawn or made an appointment.        Sincerely,        Yoselyn Castrejon CNP/ fannyd

## 2019-04-30 ENCOUNTER — TELEPHONE (OUTPATIENT)
Dept: FAMILY MEDICINE | Facility: CLINIC | Age: 84
End: 2019-04-30

## 2019-04-30 DIAGNOSIS — E11.65 TYPE 2 DIABETES MELLITUS WITH HYPERGLYCEMIA, WITHOUT LONG-TERM CURRENT USE OF INSULIN (H): ICD-10-CM

## 2019-04-30 NOTE — TELEPHONE ENCOUNTER
"Per 04-16-19 OV-  . Type 2 diabetes mellitus with hyperglycemia, without long-term current use of insulin (H)  Chronic, uncontrolled  - metFORMIN (GLUCOPHAGE) 500 MG tablet; Take 1 daily at dinner for 3 weeks, then take 1 tablet twice daily with breakfast and dinner. Recheck HgbA1c in 3 weeks  Dispense: 180 tablet; Refill: 1  -Repeat HgbA1c in 3 months      Dtr reports pt unable to tolerate metformin- C/O abd/ GI \"burning\" after taking med with meals as directed. Denies diarrhea, loose stools.  Stopped Rx yesterday due to S/E.  Please advise.  SPENCER Keith RN      "

## 2019-04-30 NOTE — TELEPHONE ENCOUNTER
Reduce dose to Metformin 1/2 tab (250 mg) after dinner for 3 weeks, then add 250 mg to breakfast. Referral to diabetic ed- please schedule appointment.   EVELIA Buckley

## 2019-04-30 NOTE — TELEPHONE ENCOUNTER
Dtr informed/ advised as noted per Yoselyn. Reviewed Rx dose/ directions.  Recheck A1C in 3 mos.  To contact clinic nurse if any further questions/ concerns.  SPENCER Keith RN

## 2019-05-10 ENCOUNTER — ALLIED HEALTH/NURSE VISIT (OUTPATIENT)
Dept: EDUCATION SERVICES | Facility: CLINIC | Age: 84
End: 2019-05-10
Payer: COMMERCIAL

## 2019-05-10 ENCOUNTER — TELEPHONE (OUTPATIENT)
Dept: FAMILY MEDICINE | Facility: CLINIC | Age: 84
End: 2019-05-10

## 2019-05-10 ENCOUNTER — TELEPHONE (OUTPATIENT)
Dept: EDUCATION SERVICES | Facility: CLINIC | Age: 84
End: 2019-05-10

## 2019-05-10 VITALS — BODY MASS INDEX: 33.27 KG/M2 | WEIGHT: 203 LBS

## 2019-05-10 DIAGNOSIS — E11.9 TYPE 2 DIABETES MELLITUS WITHOUT COMPLICATION, WITHOUT LONG-TERM CURRENT USE OF INSULIN (H): Chronic | ICD-10-CM

## 2019-05-10 DIAGNOSIS — E11.9 TYPE 2 DIABETES MELLITUS WITHOUT COMPLICATION, WITHOUT LONG-TERM CURRENT USE OF INSULIN (H): Primary | Chronic | ICD-10-CM

## 2019-05-10 PROCEDURE — G0108 DIAB MANAGE TRN  PER INDIV: HCPCS | Performed by: DIETITIAN, REGISTERED

## 2019-05-10 RX ORDER — GLIPIZIDE 5 MG/1
5 TABLET, FILM COATED, EXTENDED RELEASE ORAL DAILY
Qty: 90 TABLET | Refills: 1 | Status: SHIPPED | OUTPATIENT
Start: 2019-05-10 | End: 2019-05-21

## 2019-05-10 NOTE — TELEPHONE ENCOUNTER
Yes, I agree with this change. Hoping this is going to work better for her. Thanks EVELIA Ross

## 2019-05-10 NOTE — PROGRESS NOTES
"Diabetes Self-Management Education & Support    Diabetes Education Self Management & Training    SUBJECTIVE/OBJECTIVE:  Presents for: Initial Assessment for new diagnosis  Accompanied by: Self  Diabetes education in the past 24mo: No  Cultural Influences/Ethnic Background:  American      Diabetes Symptoms & Complications          Patient Problem List and Family Medical History reviewed for relevant medical history, current medical status, and diabetes risk factors.    Vitals:  Wt 92.1 kg (203 lb)   BMI 33.27 kg/m    Estimated body mass index is 33.27 kg/m  as calculated from the following:    Height as of 4/16/19: 1.664 m (5' 5.5\").    Weight as of this encounter: 92.1 kg (203 lb).   Last 3 BP:   BP Readings from Last 3 Encounters:   04/16/19 132/74   04/09/19 138/74   03/13/18 136/74       History   Smoking Status     Never Smoker   Smokeless Tobacco     Never Used       Labs:  Lab Results   Component Value Date    A1C 12.5 04/09/2019     Lab Results   Component Value Date     04/09/2019     Lab Results   Component Value Date    LDL 89 04/09/2019     HDL Cholesterol   Date Value Ref Range Status   04/09/2019 46 (L) >49 mg/dL Final   ]  GFR Estimate   Date Value Ref Range Status   04/09/2019 64 >60 mL/min/[1.73_m2] Final     Comment:     Non  GFR Calc  Starting 12/18/2018, serum creatinine based estimated GFR (eGFR) will be   calculated using the Chronic Kidney Disease Epidemiology Collaboration   (CKD-EPI) equation.       GFR Estimate If Black   Date Value Ref Range Status   04/09/2019 74 >60 mL/min/[1.73_m2] Final     Comment:      GFR Calc  Starting 12/18/2018, serum creatinine based estimated GFR (eGFR) will be   calculated using the Chronic Kidney Disease Epidemiology Collaboration   (CKD-EPI) equation.       Lab Results   Component Value Date    CR 0.80 04/09/2019     No results found for: MICROALBUMIN    Healthy Eating  Breakfast: waffle and najera and coffee  Lunch: " meals on wheels: 1/2 potato and meatloaf  Dinner: hotdish or pc of pizza  Snacks: beatrice  Other: she will switch to   Beverages: Water, Coffee, Milk, Soda  Has patient met with a dietitian in the past?: Yes    Being Active  Being Active Assessed Today: Yes  Exercise:: Yes  Days per week of moderate to strenuous exercise (like a brisk walk): 4  On average, minutes per day of exercise at this level: 20  How intense was your typical exercise? : Light (like stretching or slow walking)  Exercise Minutes per Week: 80    Monitoring  Meter taught today:  fasting  On 250 mg of metformin    Taking Medications  Diabetes Medication(s)     Sulfonylureas       glipiZIDE (GLUCOTROL XL) 5 MG 24 hr tablet    Take 1 tablet (5 mg) by mouth daily               Problem Solving     Not assessed            Reducing Risks     Not assessed  Healthy Coping     Patient Activation Measure Survey Score:  VIVEK Score (Last Two) 3/30/2011   VIVEK Raw Score 52   Activation Score 100   VIVEK Level 4       ASSESSMENT:  New diabetic, , having issues with metformin.  Started glipizide today.  Change to diet pop and diet juices.  Does not eat large portions and not a big sweet eater.  Grandson with help her test BG daily in am        Patient's most recent   Lab Results   Component Value Date    A1C 12.5 04/09/2019    is not meeting goal of <8.0    INTERVENTION:   Diabetes knowledge and skills assessment:     Patient is knowledgeable in diabetes management concepts related to:new to diabetes  Patient needs further education on the following diabetes management concepts: Healthy Eating, Being Active, Monitoring, Taking Medication, Problem Solving, Reducing Risks and Healthy Coping    Based on learning assessment above, most appropriate setting for further diabetes education would be: Individual setting.    Education provided today on:  AADE Self-Care Behaviors:  Healthy Eating: consistency in amount, composition, and timing of food intake and get  rid of sugar beverages  Monitoring: individual blood glucose targets and frequency of monitoring  Taking Medication: action of prescribed medication, side effects of prescribed medications and when to take medications    Opportunities for ongoing education and support in diabetes-self management were discussed.    Pt verbalized understanding of concepts discussed and recommendations provided today.       Education Materials Provided:  No new materials provided today    PLAN:  See Patient Instructions for co-developed, patient-stated behavior change goals.  AVS printed and provided to patient today. See Follow-Up section for recommended follow-up.      Time Spent: 60 minutes  Encounter Type: Individual    Any diabetes medication dose changes were made via the CDE Protocol and Collaborative Practice Agreement with the patient's primary care provider. A copy of this encounter was shared with the provider.

## 2019-05-10 NOTE — PATIENT INSTRUCTIONS
1.  Test blood sugars every morning our goal is 130-150.    2.  Call me or email in one week. [email or web address] or   [phone].    3.  Stop taking the metformin.  Start taking Glipizide XL 5 mg daily.    Cut out the regular pop and regular juice.  Use crystal light and diet pop instead.

## 2019-05-10 NOTE — TELEPHONE ENCOUNTER
Yoselyn Castrejon NP,    Please reply that we discussed and you are in agreement to stopping Metformin and starting Glipizide 5 mg 24 hour tablet one daily.    Thanks! Alisha Ruiz RD, CDE

## 2019-05-21 ENCOUNTER — TELEPHONE (OUTPATIENT)
Dept: EDUCATION SERVICES | Facility: CLINIC | Age: 84
End: 2019-05-21

## 2019-05-21 DIAGNOSIS — E11.9 TYPE 2 DIABETES MELLITUS WITHOUT COMPLICATION, WITHOUT LONG-TERM CURRENT USE OF INSULIN (H): Chronic | ICD-10-CM

## 2019-05-21 RX ORDER — GLIPIZIDE 5 MG/1
TABLET, FILM COATED, EXTENDED RELEASE ORAL
Qty: 90 TABLET | Refills: 1 | Status: SHIPPED | OUTPATIENT
Start: 2019-05-21 | End: 2019-08-27

## 2019-05-21 NOTE — TELEPHONE ENCOUNTER
Diabetes education contact:    BG levels since starting the glipizide:    Am: 225, 262, 275,   After meals: 327,276,335    Increase the Glipizide XL to 10 mg daily.    Yoselyn Castrejon NP,  Are you ok with this plan?    Thanks! Alisha Ruiz RD, CDE

## 2019-05-29 ENCOUNTER — APPOINTMENT (OUTPATIENT)
Dept: CT IMAGING | Facility: CLINIC | Age: 84
End: 2019-05-29
Attending: EMERGENCY MEDICINE
Payer: COMMERCIAL

## 2019-05-29 ENCOUNTER — HOSPITAL ENCOUNTER (EMERGENCY)
Facility: CLINIC | Age: 84
Discharge: HOME | End: 2019-05-29
Attending: EMERGENCY MEDICINE | Admitting: EMERGENCY MEDICINE
Payer: COMMERCIAL

## 2019-05-29 VITALS
TEMPERATURE: 97.5 F | HEART RATE: 69 BPM | WEIGHT: 206 LBS | SYSTOLIC BLOOD PRESSURE: 182 MMHG | DIASTOLIC BLOOD PRESSURE: 99 MMHG | BODY MASS INDEX: 34.32 KG/M2 | HEIGHT: 65 IN | OXYGEN SATURATION: 91 %

## 2019-05-29 DIAGNOSIS — R10.11 ABDOMINAL PAIN, RIGHT UPPER QUADRANT: ICD-10-CM

## 2019-05-29 DIAGNOSIS — R07.89 CHEST WALL PAIN: ICD-10-CM

## 2019-05-29 LAB
ALBUMIN SERPL-MCNC: 3.4 G/DL (ref 3.4–5)
ALBUMIN UR-MCNC: 30 MG/DL
ALP SERPL-CCNC: 64 U/L (ref 40–150)
ALT SERPL W P-5'-P-CCNC: 43 U/L (ref 0–50)
ANION GAP SERPL CALCULATED.3IONS-SCNC: 5 MMOL/L (ref 3–14)
APPEARANCE UR: ABNORMAL
AST SERPL W P-5'-P-CCNC: 32 U/L (ref 0–45)
BASOPHILS # BLD AUTO: 0 10E9/L (ref 0–0.2)
BASOPHILS NFR BLD AUTO: 0.6 %
BILIRUB SERPL-MCNC: 0.5 MG/DL (ref 0.2–1.3)
BILIRUB UR QL STRIP: NEGATIVE
BUN SERPL-MCNC: 14 MG/DL (ref 7–30)
CALCIUM SERPL-MCNC: 8.9 MG/DL (ref 8.5–10.1)
CHLORIDE SERPL-SCNC: 95 MMOL/L (ref 94–109)
CO2 SERPL-SCNC: 32 MMOL/L (ref 20–32)
COLOR UR AUTO: YELLOW
CREAT SERPL-MCNC: 0.62 MG/DL (ref 0.52–1.04)
DIFFERENTIAL METHOD BLD: ABNORMAL
EOSINOPHIL # BLD AUTO: 0.3 10E9/L (ref 0–0.7)
EOSINOPHIL NFR BLD AUTO: 3.8 %
ERYTHROCYTE [DISTWIDTH] IN BLOOD BY AUTOMATED COUNT: 11.8 % (ref 10–15)
GFR SERPL CREATININE-BSD FRML MDRD: 78 ML/MIN/{1.73_M2}
GLUCOSE SERPL-MCNC: 219 MG/DL (ref 70–99)
GLUCOSE UR STRIP-MCNC: 50 MG/DL
HCT VFR BLD AUTO: 44.6 % (ref 35–47)
HGB BLD-MCNC: 15.1 G/DL (ref 11.7–15.7)
HGB UR QL STRIP: NEGATIVE
IMM GRANULOCYTES # BLD: 0 10E9/L (ref 0–0.4)
IMM GRANULOCYTES NFR BLD: 0.4 %
KETONES UR STRIP-MCNC: NEGATIVE MG/DL
LEUKOCYTE ESTERASE UR QL STRIP: NEGATIVE
LIPASE SERPL-CCNC: 56 U/L (ref 73–393)
LYMPHOCYTES # BLD AUTO: 2.5 10E9/L (ref 0.8–5.3)
LYMPHOCYTES NFR BLD AUTO: 36.8 %
MCH RBC QN AUTO: 27.7 PG (ref 26.5–33)
MCHC RBC AUTO-ENTMCNC: 33.9 G/DL (ref 31.5–36.5)
MCV RBC AUTO: 82 FL (ref 78–100)
MONOCYTES # BLD AUTO: 0.6 10E9/L (ref 0–1.3)
MONOCYTES NFR BLD AUTO: 8.4 %
MUCOUS THREADS #/AREA URNS LPF: PRESENT /LPF
NEUTROPHILS # BLD AUTO: 3.5 10E9/L (ref 1.6–8.3)
NEUTROPHILS NFR BLD AUTO: 50 %
NITRATE UR QL: NEGATIVE
NRBC # BLD AUTO: 0 10*3/UL
NRBC BLD AUTO-RTO: 0 /100
PH UR STRIP: 7 PH (ref 5–7)
PLATELET # BLD AUTO: 212 10E9/L (ref 150–450)
POTASSIUM SERPL-SCNC: 4.4 MMOL/L (ref 3.4–5.3)
PROT SERPL-MCNC: 7.3 G/DL (ref 6.8–8.8)
RBC # BLD AUTO: 5.46 10E12/L (ref 3.8–5.2)
RBC #/AREA URNS AUTO: 0 /HPF (ref 0–2)
SODIUM SERPL-SCNC: 132 MMOL/L (ref 133–144)
SOURCE: ABNORMAL
SP GR UR STRIP: 1.01 (ref 1–1.03)
SQUAMOUS #/AREA URNS AUTO: 7 /HPF (ref 0–1)
TROPONIN I SERPL-MCNC: <0.015 UG/L (ref 0–0.04)
UROBILINOGEN UR STRIP-MCNC: 0 MG/DL (ref 0–2)
WBC # BLD AUTO: 6.9 10E9/L (ref 4–11)
WBC #/AREA URNS AUTO: 2 /HPF (ref 0–5)

## 2019-05-29 PROCEDURE — 80053 COMPREHEN METABOLIC PANEL: CPT | Performed by: EMERGENCY MEDICINE

## 2019-05-29 PROCEDURE — 99285 EMERGENCY DEPT VISIT HI MDM: CPT | Mod: 25 | Performed by: EMERGENCY MEDICINE

## 2019-05-29 PROCEDURE — 84484 ASSAY OF TROPONIN QUANT: CPT | Performed by: EMERGENCY MEDICINE

## 2019-05-29 PROCEDURE — 25000128 H RX IP 250 OP 636: Performed by: EMERGENCY MEDICINE

## 2019-05-29 PROCEDURE — 81001 URINALYSIS AUTO W/SCOPE: CPT | Performed by: EMERGENCY MEDICINE

## 2019-05-29 PROCEDURE — 93005 ELECTROCARDIOGRAM TRACING: CPT | Performed by: EMERGENCY MEDICINE

## 2019-05-29 PROCEDURE — 74177 CT ABD & PELVIS W/CONTRAST: CPT

## 2019-05-29 PROCEDURE — 85025 COMPLETE CBC W/AUTO DIFF WBC: CPT | Performed by: EMERGENCY MEDICINE

## 2019-05-29 PROCEDURE — 71260 CT THORAX DX C+: CPT

## 2019-05-29 PROCEDURE — 25000125 ZZHC RX 250: Performed by: EMERGENCY MEDICINE

## 2019-05-29 PROCEDURE — 93010 ELECTROCARDIOGRAM REPORT: CPT | Mod: Z6 | Performed by: EMERGENCY MEDICINE

## 2019-05-29 PROCEDURE — 83690 ASSAY OF LIPASE: CPT | Performed by: EMERGENCY MEDICINE

## 2019-05-29 RX ORDER — IOPAMIDOL 755 MG/ML
100 INJECTION, SOLUTION INTRAVASCULAR ONCE
Status: COMPLETED | OUTPATIENT
Start: 2019-05-29 | End: 2019-05-29

## 2019-05-29 RX ADMIN — IOPAMIDOL 100 ML: 755 INJECTION, SOLUTION INTRAVENOUS at 11:22

## 2019-05-29 RX ADMIN — SODIUM CHLORIDE 65 ML: 9 INJECTION, SOLUTION INTRAVENOUS at 11:22

## 2019-05-29 ASSESSMENT — MIFFLIN-ST. JEOR: SCORE: 1345.29

## 2019-05-29 NOTE — ED PROVIDER NOTES
History     Chief Complaint   Patient presents with     Abdominal Pain     ruq pain, pain under rt breast and rt side abd, pain for 6 weeks, sharp/achy in nature, no n/v/d, no fevers     HPI  Adela Means is a 89 year old female with history of hypothyroidism, hypertension, type II diabetes, hypertriglyceridemia, degenerative joint disease, osteopenia, and gastritis who presents to the ED with RUQ and flank pain. The patient presents with a family member who assisted with history. The patient's family member reports she has been having RUQ and flank pain for the past 6 weeks. She reports the pain is over her ribs on her RUQ and radiates to her right flank. Her family member reports the pain will get so bad the patient needs to hold her RUQ in. The patient reports she was unable to wear a bra today due to the pain. Her family member reports that last night she used arthritis cream over the region of pain on her RUQ to try to help with the pain. Additionally, the patient reports for the past fours day she had had non-regular bowel movements. Her family member reports she typically has two bowel movements a day. Her family member reports giving her stool softeners the past couples days, which has helped relieved past constipation, but she reports no relief. Her family member reports she recently switched from metformin to glipizide for her diabetes and has not seemed to have normal bowel movements since this medication switch.  She denies recent falls. She denies general abdominal pain.  She has a history of a cholecystectomy and appendectomy.  She denies headache.  She has not had any visual changes.  She denies sore throat.  She denies any significant shortness of breath.  She has not had any focal numbness weakness any extremity.  She denies any rash.    Allergies:  Allergies   Allergen Reactions     Ibuprofen      Vitamin C      But is fine with orange juice       Problem List:    Patient Active Problem List  "   Diagnosis Date Noted     Type 2 diabetes mellitus without complication, without long-term current use of insulin (H), HgbA1c goal <8 03/13/2017     Priority: Medium     Seborrheic dermatitis 01/23/2014     Priority: Medium     Advanced directives, counseling/discussion 02/21/2012     Priority: Medium     2-21-12 \"I think I have one, my daughter, [place] would know\" (to bring in to clinic if she has one)       Gastritis 10/21/2009     Priority: Medium     EGD 10/13/2009, diffuse gastritis, worse in antral region.       Hepatic cyst 10/20/2009     Priority: Medium     CT 10/2008 - Stable       Hypertriglyceridemia, LDL goal <100 10/20/2009     Priority: Medium     Osteopenia 10/20/2009     Priority: Medium     11/08 - Lumbar T-score 0.8, right femur -1.0       DJD (degenerative joint disease) 10/20/2009     Priority: Medium     2001       Benign essential hypertension, BP <140/90 10/19/2009     Priority: Medium     Knee pain 10/19/2009     Priority: Medium     Acquired hypothyroidism 09/02/2009     Priority: Medium                 Past Medical History:    Past Medical History:   Diagnosis Date     Basal cell carcinoma      Hepatic cyst      Hypertriglyceridemia      Osteopenia        Past Surgical History:    Past Surgical History:   Procedure Laterality Date     APPENDECTOMY  age 18     CHOLECYSTECTOMY, OPEN       HYSTERECTOMY, VAGINAL         Family History:    No family history on file.    Social History:  Marital Status:   [5]  Social History     Tobacco Use     Smoking status: Never Smoker     Smokeless tobacco: Never Used   Substance Use Topics     Alcohol use: No     Drug use: No        Medications:      ACE/ARB NOT PRESCRIBED, INTENTIONAL,   aspirin (ASA) 81 MG tablet   blood glucose (CONTOUR NEXT TEST) test strip   blood glucose monitoring (PEREZ MICROLET) lancets   calcium carbonate (OS-KATHY 600 MG [place]. [place]) 600 MG tablet   clobetasol (TEMOVATE) 0.05 % external solution   EQ ASPIRIN ADULT LOW DOSE " "81 MG EC tablet   fluocinolone (SYNALAR) 0.01 % cream   furosemide (LASIX) 20 MG tablet   glipiZIDE (GLUCOTROL XL) 5 MG 24 hr tablet   ketoconazole (NIZORAL) 2 % external shampoo   levothyroxine (SYNTHROID/LEVOTHROID) 112 MCG tablet   omega-3 acid ethyl esters (LOVAZA) 1 g capsule   order for DME   VITAMIN D3 1000 units tablet         ROS  All systems reviewed and other than pertinent positives and negatives all other systems are negative.  Physical Exam   BP: 194/80  Pulse: 59  Temp: 97.5  F (36.4  C)  Height: 165.1 cm (5' 5\")  Weight: 93.4 kg (206 lb)  SpO2: 94 %      Physical Exam   Constitutional: She appears well-developed and well-nourished. No distress.   Patient is hard of hearing.   Eyes: Conjunctivae are normal.   Psychiatric: She has a normal mood and affect.   Nursing note and vitals reviewed.     HENT: Oral mucosa moist. No lesions.  Neck: Supple  Pulmonary/Chest: tenderness to palpation of right  Lower lateral anterior chest wall. Breath sounds clear but decreased at bases.there is mild irritation under there R breast this area is tender to palpation . No significant erythema.   Cardiovascular: Heart is regular rate and rhythm. No murmur.  Abdomen: Soft, non-distended, mild tenderness to palpitation RUQ. No guarding. No rebound. Positive bowel sounds.  Musculoskeletal: Moving all extremities well. No peripheral edema. NO flank pain or midline back pain is present.there is no calf tenderness . Pulses and sensation are symmetrical.   Neurological: Alert. No focal neurologic deficit.   Skin: No rash.    ED Course        Procedures               EKG Interpretation:      Interpreted by Lincoln Frankel  Rhythm: normal sinus   Rate: Normal  Axis: Left Axis Deviation  Ectopy: none  Conduction: 1st degree AV block anterior fascicular block  ST Segments/ T Waves: Non-specific ST-T wave changes  Q Waves: none  Comparison to prior:similar to reading of [facility] ekg on 5/18/18.    Clinical Impression: nsr with " 1st degree block , non specific st twave changes.      Critical Care time:  none     Labs Ordered and Resulted from Time of ED Arrival Up to the Time of Departure from the ED   URINE MACROSCOPIC WITH REFLEX TO MICRO - Abnormal; Notable for the following components:       Result Value    Glucose Urine 50 (*)     Protein Albumin Urine 30 (*)     Squamous Epithelial /HPF Urine 7 (*)     Mucous Urine Present (*)     All other components within normal limits   CBC WITH PLATELETS DIFFERENTIAL - Abnormal; Notable for the following components:    RBC Count 5.46 (*)     All other components within normal limits   COMPREHENSIVE METABOLIC PANEL - Abnormal; Notable for the following components:    Sodium 132 (*)     Glucose 219 (*)     All other components within normal limits   LIPASE - Abnormal; Notable for the following components:    Lipase 56 (*)     All other components within normal limits   TROPONIN I               Results for orders placed or performed during the hospital encounter of 05/29/19   CT Chest/Abdomen/Pelvis w Contrast    Narrative    CT CHEST/ABDOMEN/PELVIS WITH CONTRAST  5/29/2019 11:32 AM    HISTORY:  Right-sided rib pain with right upper quadrant abdominal  pain. Six weeks of pain at the right ribs deep to the right breast and  right abdomen. No trauma is reported. Cholecystectomy and  appendectomy.    TECHNIQUE: CT scan obtained of the chest, abdomen, and pelvis with  oral and IV contrast. 100 mL Isovue 370 IV injected. Radiation dose  for this scan was reduced using automated exposure control, adjustment  of the mA and/or kV according to patient size, or iterative  reconstruction technique.    COMPARISON:  CT chest 9/14/2009.    FINDINGS:  Chest: No large central pulmonary embolism. This examination is  nondiagnostic for complete assessment of the small branch vessels of  the pulmonary arterial tree related to limited opacification. Mild  thoracic aortic calcifications without acute abnormality.  Coronary  artery calcifications are present. No effusions. No acute airspace  disease. Prior CT chest is a limited scan of the chest. Not included  are a few nodules. Right minor fissure nodule is 0.4 cm, series 4  image 136. A calcified granuloma is incidentally seen at the anterior  right upper lobe, image 116. Stable granuloma at the left lower lobe.  No acute airspace disease.  No acute rib fractures are seen. No  specific acute chest wall abnormality is identified. A few bilateral  thyroid nodules noted.    Abdomen/pelvis: Fatty infiltration of the liver. No acute hepatic  abnormality. Cholecystectomy. Adrenals, spleen, pancreas, and kidneys  do not show any acute abnormalities. There is a 0.1 cm calcification  along the anticipated course of the distal right ureter near the  ureterovesical junction, series 2 image 102. No hydronephrosis. There  is a focal region of fluid density that is 2.9 cm that could be a  posterior bladder diverticulum, or perhaps a right ovarian cystic  structure, series 2 image 100.    Scattered colonic diverticula. No diverticulitis. By report, this  patient is status post appendectomy. There is no abscess or free air.      Impression    IMPRESSION:  1. No specific abnormality is seen to account for the patient's chest  wall pain. No acute airspace disease.  2. 0.1 cm calcification along the anticipated course of the distal  right ureter could be a nonobstructing stone versus adjacent vascular  calcification. No hydronephrosis.  3. Fatty liver.  4. Cystic structure at the right pelvis near the course of the distal  right ureter could be a bladder diverticulum versus ovarian cyst.  5. Coronary artery calcifications.  6. Small pulmonary nodule on the right. See below for follow up  imaging guidelines.    Recommendations for one or multiple incidental lung nodules < 6mm :    Low risk patients: No routine follow-up.    High risk patients: Optional follow-up CT at 12 months; if  unchanged,  no further follow-up.    *Low Risk: Minimal or absent history of smoking or other known risk  factors.  *Nonsolid (ground glass) or partly solid nodules may require longer  follow-up to exclude indolent adenocarcinoma.  *Recommendations based on Guidelines for the Management of Incidental  Pulmonary Nodules Detected at CT: From the Fleischner Society 2017,  Radiology 2017.    JOSE PHILIP MD         Medications   iopamidol (ISOVUE-370) solution 100 mL (100 mLs Intravenous Given 5/29/19 1122)   sodium chloride 0.9 % bag 500mL for CT scan flush use (65 mLs Intravenous Given 5/29/19 1122)        10:03 AM Patient Assessed.    Assessments & Plan (with Medical Decision Making) records were reviewed.  EKG and labs were obtained.  Patient was given IV fluids.  White count was 6.9.  Hemoglobin 15.1.  Platelet count 212.  Comprehensive metabolic panel with a slightly decreased sodium.  Urine analysis without evidence of infection.  Lipase was decreased.  Troponin was within normal limits.  Patient's EKG was similar to the reading from a [facility] EKG 1 year ago.  No acute ST-T wave changes were noted.  Due to her chest pain and abdominal pain I felt a CT scan of the chest and abdomen was warranted.  This revealed no obvious acute abnormality.  No obvious source of patient's pain.  Findings were discussed in detail with the daughter and the patient.  She is actually feeling better at this point.  She still has some chest wall tenderness but her abdominal pain is improved.  There is no significant evidence of constipation but trying few doses of MiraLAX or Dulcolax could be tried.  The irritation under the right breast could have a antifungal cream placed.  She should try Tylenol for pain.  Patient feels comfortable going home at this time.  She does not feel she needs to be admitted.  Daughter will bring her back and if symptoms worsen or new symptoms develop or other concerns.     I have reviewed the nursing notes.    I have  reviewed the findings, diagnosis, plan and need for follow up with the patient.          Medication List      There are no discharge medications for this visit.         Final diagnoses:   Chest wall pain   Abdominal pain, right upper quadrant     This document serves as a record of the services and decisions personally performed and made by Lincoln Frankel MD. It was created on HIS/HER behalf by   Idalmis Herbert, a trained medical scribe. The creation of this document is based the provider's statements to the medical scribe.  Idalmis Herbert 10:02 AM 5/29/2019    Provider:   The information in this document, created by the medical scribe for me, accurately reflects the services I personally performed and the decisions made by me. I have reviewed and approved this document for accuracy prior to leaving the patient care area.  Lincoln Frankel MD 10:02 AM 5/29/2019 5/29/2019   [facility] EMERGENCY DEPARTMENT     Lincoln Frankel MD  05/30/19 0830

## 2019-05-29 NOTE — ED AVS SNAPSHOT
[facility] Emergency Department  [address] [address]  Phone:  [phone]  Fax:  [phone]                                    Adela Means   MRN: [id number]    Department:  [facility] Emergency Department   Date of Visit:  5/29/2019           After Visit Summary Signature Page    I have received my discharge instructions, and my questions have been answered. I have discussed any challenges I see with this plan with the nurse or doctor.    ..........................................................................................................................................  Patient/Patient Representative Signature      ..........................................................................................................................................  Patient Representative Print Name and Relationship to Patient    ..................................................               ................................................  Date                                   Time    ..........................................................................................................................................  Reviewed by Signature/Title    ...................................................              ..............................................  Date                                               Time          22EPIC Rev 08/18

## 2019-05-29 NOTE — DISCHARGE INSTRUCTIONS
Return if symptoms worsen or new symptoms develop.  Follow-up with primary care later this week for recheck.  Apply antifungal agent talc powder under her right breast.  Take Tylenol for pain.  Drink plenty fluids.  He may try MiraLAX or Dulcolax for further bowel movements.  If any fevers worsening chest pain abdominal pain or other symptoms present please return for further evaluation and care.

## 2019-05-29 NOTE — ED TRIAGE NOTES
ruq pain, pain under rt breast and rt side abd, pain for 6 weeks, sharp/achy in nature, no n/v/d, no fevers

## 2019-07-19 ENCOUNTER — TELEPHONE (OUTPATIENT)
Dept: FAMILY MEDICINE | Facility: CLINIC | Age: 84
End: 2019-07-19

## 2019-07-22 DIAGNOSIS — M85.80 OSTEOPENIA: ICD-10-CM

## 2019-07-30 ENCOUNTER — HOSPITAL ENCOUNTER (EMERGENCY)
Facility: CLINIC | Age: 84
Discharge: HOME | End: 2019-07-30
Attending: NURSE PRACTITIONER | Admitting: NURSE PRACTITIONER
Payer: COMMERCIAL

## 2019-07-30 ENCOUNTER — APPOINTMENT (OUTPATIENT)
Dept: GENERAL RADIOLOGY | Facility: CLINIC | Age: 84
End: 2019-07-30
Attending: NURSE PRACTITIONER
Payer: COMMERCIAL

## 2019-07-30 VITALS
OXYGEN SATURATION: 94 % | RESPIRATION RATE: 16 BRPM | TEMPERATURE: 97.8 F | DIASTOLIC BLOOD PRESSURE: 80 MMHG | WEIGHT: 207 LBS | BODY MASS INDEX: 33.27 KG/M2 | SYSTOLIC BLOOD PRESSURE: 162 MMHG | HEIGHT: 66 IN

## 2019-07-30 DIAGNOSIS — M25.561 RIGHT KNEE PAIN: ICD-10-CM

## 2019-07-30 PROCEDURE — 99284 EMERGENCY DEPT VISIT MOD MDM: CPT | Mod: Z6 | Performed by: NURSE PRACTITIONER

## 2019-07-30 PROCEDURE — 99283 EMERGENCY DEPT VISIT LOW MDM: CPT | Performed by: NURSE PRACTITIONER

## 2019-07-30 PROCEDURE — 73562 X-RAY EXAM OF KNEE 3: CPT | Mod: RT

## 2019-07-30 RX ORDER — ACETAMINOPHEN 500 MG
500-1000 TABLET ORAL EVERY 6 HOURS PRN
Qty: 90 TABLET | Refills: 0 | COMMUNITY
Start: 2019-07-30 | End: 2020-01-03

## 2019-07-30 ASSESSMENT — ENCOUNTER SYMPTOMS
FEVER: 0
DIAPHORESIS: 0
CHILLS: 0
DIFFICULTY URINATING: 0
DYSURIA: 0
EYE REDNESS: 0
NAUSEA: 0
WHEEZING: 0
CONSTIPATION: 0
DIARRHEA: 0
HEADACHES: 0
SHORTNESS OF BREATH: 0
CONFUSION: 0
COUGH: 0
VOMITING: 0
ABDOMINAL PAIN: 0
ARTHRALGIAS: 1

## 2019-07-30 ASSESSMENT — MIFFLIN-ST. JEOR: SCORE: 1357.76

## 2019-07-30 NOTE — ED PROVIDER NOTES
"  History     Chief Complaint   Patient presents with     Knee Pain     rt knee pain x 6 mos off/on-no known injury-difficult wt bearing     HPI  Adela Means is a 89 year old female with a past medical history of type 2 diabetes, degenerative joint disease, hypothyroidism who presents with right knee pain.  Pt states the pain started 6 months ago.  Patient presents with daughter who reports that she is her caregiver for the past 6 to 8 years.  Patient denies any recent trauma, falls, abrasions, redness to the right knee.  Patient states that she has been dealing with this knee pain and they called the triage line this morning who advised them to come to the ED for further evaluation and possible joint injection.  Patient denies any fever, aches, chills, sweats.  Patient states that when she has severe pain she takes one Tylenol and this resolves her pain.  Patient routinely does not take Tylenol.  Patient is also tried Aspercreme topically and this is mildly helpful.  Patient denies any history of joint replacements to either knee.    Allergies:  Allergies   Allergen Reactions     Ibuprofen Swelling     Of lips     Vitamin C Swelling     Of lips, But is fine with orange juice       Problem List:    Patient Active Problem List    Diagnosis Date Noted     Type 2 diabetes mellitus without complication, without long-term current use of insulin (H), HgbA1c goal <8 03/13/2017     Priority: Medium     Seborrheic dermatitis 01/23/2014     Priority: Medium     Advanced directives, counseling/discussion 02/21/2012     Priority: Medium     2-21-12 \"I think I have one, my daughter, [place] would know\" (to bring in to clinic if she has one)       Gastritis 10/21/2009     Priority: Medium     EGD 10/13/2009, diffuse gastritis, worse in antral region.       Hepatic cyst 10/20/2009     Priority: Medium     CT 10/2008 - Stable       Hypertriglyceridemia, LDL goal <100 10/20/2009     Priority: Medium     Osteopenia 10/20/2009     " Priority: Medium     11/08 - Lumbar T-score 0.8, right femur -1.0       DJD (degenerative joint disease) 10/20/2009     Priority: Medium     2001       Benign essential hypertension, BP <140/90 10/19/2009     Priority: Medium     Knee pain 10/19/2009     Priority: Medium     Acquired hypothyroidism 09/02/2009     Priority: Medium                 Past Medical History:    Past Medical History:   Diagnosis Date     Basal cell carcinoma      Hepatic cyst      Hypertriglyceridemia      Osteopenia        Past Surgical History:    Past Surgical History:   Procedure Laterality Date     APPENDECTOMY  age 18     CHOLECYSTECTOMY, OPEN       HYSTERECTOMY, VAGINAL         Family History:    No family history on file.    Social History:  Marital Status:   [5]  Social History     Tobacco Use     Smoking status: Never Smoker     Smokeless tobacco: Never Used   Substance Use Topics     Alcohol use: No     Drug use: No        Medications:      acetaminophen (TYLENOL) 500 MG tablet   aspirin (ASA) 81 MG tablet   blood glucose (CONTOUR NEXT TEST) test strip   blood glucose monitoring (BiomatricaET) lancets   calcium carbonate (OS-KATHY 600 MG [place]. [place]) 600 MG tablet   clobetasol (TEMOVATE) 0.05 % external solution   EQ ASPIRIN ADULT LOW DOSE 81 MG EC tablet   furosemide (LASIX) 20 MG tablet   glipiZIDE (GLUCOTROL XL) 5 MG 24 hr tablet   ketoconazole (NIZORAL) 2 % external shampoo   levothyroxine (SYNTHROID/LEVOTHROID) 112 MCG tablet   omega-3 acid ethyl esters (LOVAZA) 1 g capsule   order for DME   vitamin D3 (VITAMIN D3) 1000 units (25 mcg) tablet   ACE/ARB NOT PRESCRIBED, INTENTIONAL,         Review of Systems   Constitutional: Negative for chills, diaphoresis and fever.   HENT: Negative for congestion and ear pain.    Eyes: Negative for redness and visual disturbance.   Respiratory: Negative for cough, shortness of breath and wheezing.    Cardiovascular: Negative for chest pain.   Gastrointestinal: Negative for abdominal  "pain, constipation, diarrhea, nausea and vomiting.   Genitourinary: Negative for difficulty urinating and dysuria.   Musculoskeletal: Positive for arthralgias (right knee) and gait problem.   Neurological: Negative for headaches.   Psychiatric/Behavioral: Negative for confusion.   All other systems reviewed and are negative.      Physical Exam   BP: (!) 178/87  Heart Rate: 64  Temp: 97.8  F (36.6  C)  Resp: 16  Height: 166.4 cm (5' 5.5\")  Weight: 93.9 kg (207 lb)  SpO2: 94 %      Physical Exam   Constitutional: She appears well-developed and well-nourished. No distress.   HENT:   Head: Normocephalic and atraumatic.   Right Ear: External ear normal.   Left Ear: External ear normal.   Eyes: Conjunctivae are normal. Right eye exhibits no discharge. Left eye exhibits no discharge.   Cardiovascular: Regular rhythm and normal heart sounds. Exam reveals no friction rub.   No murmur heard.  Pulmonary/Chest: Effort normal and breath sounds normal. No stridor. No respiratory distress. She has no wheezes. She has no rales. She exhibits no tenderness.   Musculoskeletal:        Right knee: She exhibits bony tenderness. She exhibits normal range of motion, no swelling, no effusion, no ecchymosis, no deformity, no laceration, no erythema and normal alignment. Tenderness found. Medial joint line, lateral joint line and patellar tendon tenderness noted.        Left knee: Normal.   Anterior drawer sign negative  Posterior drawer sign is negative but elicits pain.  Stress varus and stress valgus maneuver elicits pain.  Popliteal space is nontender without swelling or erythema   Neurological: She is alert.   Skin: Skin is warm and dry. No rash noted. She is not diaphoretic. No erythema. No pallor.   Psychiatric: She has a normal mood and affect.   Nursing note and vitals reviewed.      ED Course        Procedures    Results for orders placed or performed during the hospital encounter of 07/30/19 (from the past 24 hour(s))   XR Knee " Right 3 Views    Narrative    RIGHT KNEE THREE VIEWS 7/30/2019 10:46 AM     HISTORY: Acute on chronic knee pain; no trauma.    COMPARISON: 10/19/2009.      Impression    IMPRESSION: Interval progression of medial and lateral joint space  narrowing which is now moderate to severe. Associated marginal  endplate spurring is also noted at the medial and lateral compartments  as well as the patellofemoral joint. No acute bony abnormality. No  significant joint space effusion. Vascular calcifications are seen.       Medications - No data to display    Assessments & Plan (with Medical Decision Making)     I have reviewed the nursing notes.    I have reviewed the findings, diagnosis, plan and need for follow up with the patient.  89-year-old female presenting to urgent care with concerns of acute on chronic right-sided knee pain.  Daughter presents with her and states they are hoping to get lined up for joint injections.  Patient reports over the past week her knee has been worsening.  Patient reports that her knee pain is related to the weather changes in barometric pressure.  Patient occasionally takes Tylenol for pain and this helps relieve the pain.  Patient denies any trauma, abrasions, redness, swelling, fever, aches, chills, sweats.  Exam as noted above and consistent with degenerative changes.  X-ray ordered to evaluate for any acute process of effusion versus fracture and are negative.  Patient reporting that X strength Tylenol is helpful for her pain management.  Encourage patient to take the Tylenol once or twice daily as needed for pain management.  Orthopedic  referral placed for the joint injections that patient is requesting.  Patient discharged in stable condition.     Medication List      Started    acetaminophen 500 MG tablet  Commonly known as:  TYLENOL  500-1,000 mg, Oral, EVERY 6 HOURS PRN            Final diagnoses:   Right knee pain - degenerative knee changes       7/30/2019   [facility]  EMERGENCY DEPARTMENT     Rosa Guerin, MARTHA CNP  07/30/19 5726

## 2019-07-30 NOTE — DISCHARGE INSTRUCTIONS
I recommend taking Tylenol 500 mg twice daily for knee pain.  Follow-up with orthopedics for ongoing care.

## 2019-07-30 NOTE — ED NOTES
"Comes in with knee pain she has had for years, however it seems to be getting worse the past 6 months, no new injury or changes, states \" the triage nurse said the fasts why to be seen and get an injection in my knee is for me to come to the ER\". Pt denies fever/chills. Monitor   "

## 2019-07-30 NOTE — ED AVS SNAPSHOT
[facility] Emergency Department  [address] [facility] [address]  Phone:  [phone]  Fax:  [phone]                                    Adela Means   MRN: [id number]    Department:  [facility] Emergency Department   Date of Visit:  7/30/2019           After Visit Summary Signature Page    I have received my discharge instructions, and my questions have been answered. I have discussed any challenges I see with this plan with the nurse or doctor.    ..........................................................................................................................................  Patient/Patient Representative Signature      ..........................................................................................................................................  Patient Representative Print Name and Relationship to Patient    ..................................................               ................................................  Date                                   Time    ..........................................................................................................................................  Reviewed by Signature/Title    ...................................................              ..............................................  Date                                               Time          22EPIC Rev 08/18

## 2019-08-02 ENCOUNTER — ANCILLARY PROCEDURE (OUTPATIENT)
Dept: GENERAL RADIOLOGY | Facility: CLINIC | Age: 84
End: 2019-08-02
Attending: PEDIATRICS
Payer: COMMERCIAL

## 2019-08-02 ENCOUNTER — OFFICE VISIT (OUTPATIENT)
Dept: ORTHOPEDICS | Facility: CLINIC | Age: 84
End: 2019-08-02
Payer: COMMERCIAL

## 2019-08-02 VITALS
WEIGHT: 207 LBS | SYSTOLIC BLOOD PRESSURE: 162 MMHG | HEIGHT: 66 IN | DIASTOLIC BLOOD PRESSURE: 82 MMHG | BODY MASS INDEX: 33.27 KG/M2

## 2019-08-02 DIAGNOSIS — G89.29 BILATERAL CHRONIC KNEE PAIN: ICD-10-CM

## 2019-08-02 DIAGNOSIS — M17.0 ARTHRITIS OF BOTH KNEES: Primary | ICD-10-CM

## 2019-08-02 DIAGNOSIS — M25.561 BILATERAL CHRONIC KNEE PAIN: ICD-10-CM

## 2019-08-02 DIAGNOSIS — M25.562 BILATERAL CHRONIC KNEE PAIN: ICD-10-CM

## 2019-08-02 PROCEDURE — 73562 X-RAY EXAM OF KNEE 3: CPT | Mod: RT | Performed by: PEDIATRICS

## 2019-08-02 PROCEDURE — 99203 OFFICE O/P NEW LOW 30 MIN: CPT | Performed by: PEDIATRICS

## 2019-08-02 RX ORDER — ACETAMINOPHEN 500 MG
500-1000 TABLET ORAL EVERY 8 HOURS PRN
Qty: 30 TABLET | Refills: 0 | Status: SHIPPED | OUTPATIENT
Start: 2019-08-02 | End: 2022-08-23 | Stop reason: DRUGHIGH

## 2019-08-02 ASSESSMENT — MIFFLIN-ST. JEOR: SCORE: 1357.76

## 2019-08-02 NOTE — PATIENT INSTRUCTIONS
Plan:  - Today's Plan of Care:  Over the counter medication: Acetaminophen (Tylenol) 1000mg every 6 hours with food (Maximum of 3000mg/day)  Rehab: Physical Therapy: [facility] - [phone]  Steroid of the  bilateral knee performed via ultrasound at [facility]    -We also discussed other future treatment options:  Referral to Orthopedic Surgery    Follow Up: as needed    If you have any further questions for your physician or physician s care team you can call [phone] and use option 3 to leave a voice message. Calls received during business hours will be returned same day.      Adela to follow up with Primary Care provider regarding elevated blood pressure.

## 2019-08-02 NOTE — LETTER
8/2/2019         RE: Adela Means  [address] [facility] [address]        Dear Colleague,    Thank you for referring your patient, Adela Means, to the [facility]. Please see a copy of my visit note below.    Sports Medicine Clinic Visit    PCP: Yoselyn Castrejon    Adela Means is a 89 year old female who is seen  as an ER referral presenting with bilteral knee pain    Injury: She reports chronic knee pain for 6 months. She has pain intermittently with walking. She reports swelling and pain in the anterior knee, right currently worse than left.    Location of Pain: bilateral knee pain  Duration of Pain: 6 month(s)  Rating of Pain at worst: 9/10  Rating of Pain Currently: 6/10  Symptoms are better with: Rest  Symptoms are worse with: walking  Additional Features:   Positive: swelling and weakness   Negative: bruising, popping, grinding, catching, locking, instability, paresthesias and numbness  Other evaluation and/or treatments so far consists of: Nothing  Prior History of related problems: nothing    Social History: retired    Review of Systems  Skin: no bruising, yes swelling  Musculoskeletal: as above  Neurologic: no numbness, paresthesias  Remainder of review of systems is negative including constitutional, CV, pulmonary, GI, except as noted in HPI or medical history.    Patient's current problem list, past medical and surgical history, and family history were reviewed.    Patient Active Problem List   Diagnosis     Acquired hypothyroidism     Benign essential hypertension, BP <140/90     Knee pain     Hepatic cyst     Hypertriglyceridemia, LDL goal <100     Osteopenia     DJD (degenerative joint disease)     Gastritis     Advanced directives, counseling/discussion     Seborrheic dermatitis     Type 2 diabetes mellitus without complication, without long-term current use of insulin (H), HgbA1c goal <8     Past Medical History:   Diagnosis Date     Basal  "cell carcinoma      Hepatic cyst      Hypertriglyceridemia     mild      Osteopenia     dexa 11/2008     Past Surgical History:   Procedure Laterality Date     APPENDECTOMY  age 18     CHOLECYSTECTOMY, OPEN       HYSTERECTOMY, VAGINAL       No family history on file.      Objective  BP (!) 162/82   Ht 1.664 m (5' 5.5\")   Wt 93.9 kg (207 lb)   BMI 33.92 kg/m       GENERAL APPEARANCE: healthy, alert and no distress   GAIT: antalgic and wheelchair  SKIN: no suspicious lesions or rashes  HEENT: Sclera clear, anicteric  CV: good peripheral pulses  RESP: Breathing not labored  NEURO: Normal strength and tone, mentation intact and speech normal  PSYCH:  mentation appears normal and affect normal/bright    Bilateral Knee exam  Inspection:      mild effusion right    Patella:      Crepitus noted in the patellofemoral joint bilateral    Tender:      medial patellar border right       medial joint line right    Non Tender:      remainder of knee area bilateral    Knee ROM:      Range of motion limited by pain and swelling right > left    Strength:      5-/5 bilaterally    Special Tests:     *difficulty with special testing due to positioning    Gait:      antalgic gait    Neurovascular:      2+ peripheral pulses bilaterally and brisk capillary refill       sensation grossly intact    Radiology  I ordered, visualized and reviewed these images with the patient  Xr Knee Bilateral 3 Vw  Result Date: 8/2/2019  KNEE BILATERAL THREE VIEWS  8/2/2019 1:25 PM HISTORY: Bilateral chronic knee pain.   IMPRESSION: Bilateral osteoarthritis with right knee severe lateral compartment and left knee severe medial compartment joint space narrowing. Right knee moderate medial compartment joint space narrowing is also noted. There are small joint effusions, right greater than left.    Assessment:  1. Arthritis of both knees      Discussed nature of degenerative arthrosis of the knee. Discussed symptom treatment with over-the-counter medications, " ice or heat, topical treatments, and rest if needed. Discussed use of sleeve or wrap for comfort. Discussed benefits of exercise and weight loss to reduce pressure at the knee. Discussed injection therapy. Also briefly discussed future consideration of referral to orthopedic surgery for further evaluation and discussion of arthroplasty.    Plan:  - Today's Plan of Care:  Over the counter medication: Acetaminophen (Tylenol) 1000mg every 6 hours with food (Maximum of 3000mg/day)  Rehab: Physical Therapy: [facility] - [phone]  Steroid of the  bilateral knee performed via ultrasound at [facility]    -We also discussed other future treatment options:  Referral to Orthopedic Surgery    Follow Up: as needed  Adela to follow up with Primary Care provider regarding elevated blood pressure.    Concerning signs and symptoms were reviewed.  The patient expressed understanding of this management plan and all questions were answered at this time.    Nivia Sandra MD CAQ  Primary Care Sports Medicine  [facility]      Again, thank you for allowing me to participate in the care of your patient.        Sincerely,        Nivia Sandra MD

## 2019-08-02 NOTE — PROGRESS NOTES
Sports Medicine Clinic Visit    PCP: Yoselyn Castrejon    Adela Means is a 89 year old female who is seen  as an ER referral presenting with bilteral knee pain    Injury: She reports chronic knee pain for 6 months. She has pain intermittently with walking. She reports swelling and pain in the anterior knee, right currently worse than left.    Location of Pain: bilateral knee pain  Duration of Pain: 6 month(s)  Rating of Pain at worst: 9/10  Rating of Pain Currently: 6/10  Symptoms are better with: Rest  Symptoms are worse with: walking  Additional Features:   Positive: swelling and weakness   Negative: bruising, popping, grinding, catching, locking, instability, paresthesias and numbness  Other evaluation and/or treatments so far consists of: Nothing  Prior History of related problems: nothing    Social History: retired    Review of Systems  Skin: no bruising, yes swelling  Musculoskeletal: as above  Neurologic: no numbness, paresthesias  Remainder of review of systems is negative including constitutional, CV, pulmonary, GI, except as noted in HPI or medical history.    Patient's current problem list, past medical and surgical history, and family history were reviewed.    Patient Active Problem List   Diagnosis     Acquired hypothyroidism     Benign essential hypertension, BP <140/90     Knee pain     Hepatic cyst     Hypertriglyceridemia, LDL goal <100     Osteopenia     DJD (degenerative joint disease)     Gastritis     Advanced directives, counseling/discussion     Seborrheic dermatitis     Type 2 diabetes mellitus without complication, without long-term current use of insulin (H), HgbA1c goal <8     Past Medical History:   Diagnosis Date     Basal cell carcinoma      Hepatic cyst      Hypertriglyceridemia     mild      Osteopenia     dexa 11/2008     Past Surgical History:   Procedure Laterality Date     APPENDECTOMY  age 18     CHOLECYSTECTOMY, OPEN       HYSTERECTOMY, VAGINAL       No family history  "on file.      Objective  BP (!) 162/82   Ht 1.664 m (5' 5.5\")   Wt 93.9 kg (207 lb)   BMI 33.92 kg/m      GENERAL APPEARANCE: healthy, alert and no distress   GAIT: antalgic and wheelchair  SKIN: no suspicious lesions or rashes  HEENT: Sclera clear, anicteric  CV: good peripheral pulses  RESP: Breathing not labored  NEURO: Normal strength and tone, mentation intact and speech normal  PSYCH:  mentation appears normal and affect normal/bright    Bilateral Knee exam  Inspection:      mild effusion right    Patella:      Crepitus noted in the patellofemoral joint bilateral    Tender:      medial patellar border right       medial joint line right    Non Tender:      remainder of knee area bilateral    Knee ROM:      Range of motion limited by pain and swelling right > left    Strength:      5-/5 bilaterally    Special Tests:     *difficulty with special testing due to positioning    Gait:      antalgic gait    Neurovascular:      2+ peripheral pulses bilaterally and brisk capillary refill       sensation grossly intact    Radiology  I ordered, visualized and reviewed these images with the patient  Xr Knee Bilateral 3 Vw  Result Date: 8/2/2019  KNEE BILATERAL THREE VIEWS  8/2/2019 1:25 PM HISTORY: Bilateral chronic knee pain.   IMPRESSION: Bilateral osteoarthritis with right knee severe lateral compartment and left knee severe medial compartment joint space narrowing. Right knee moderate medial compartment joint space narrowing is also noted. There are small joint effusions, right greater than left.    Assessment:  1. Arthritis of both knees      Discussed nature of degenerative arthrosis of the knee. Discussed symptom treatment with over-the-counter medications, ice or heat, topical treatments, and rest if needed. Discussed use of sleeve or wrap for comfort. Discussed benefits of exercise and weight loss to reduce pressure at the knee. Discussed injection therapy. Also briefly discussed future consideration of " referral to orthopedic surgery for further evaluation and discussion of arthroplasty.    Plan:  - Today's Plan of Care:  Over the counter medication: Acetaminophen (Tylenol) 1000mg every 6 hours with food (Maximum of 3000mg/day)  Rehab: Physical Therapy: [facility] - [phone]  Steroid of the  bilateral knee performed via ultrasound at [facility]    -We also discussed other future treatment options:  Referral to Orthopedic Surgery    Follow Up: as needed  Adela to follow up with Primary Care provider regarding elevated blood pressure.    Concerning signs and symptoms were reviewed.  The patient expressed understanding of this management plan and all questions were answered at this time.    Nivia Sandra MD CAQ  Primary Care Sports Medicine  [facility]

## 2019-08-05 ENCOUNTER — HOSPITAL ENCOUNTER (OUTPATIENT)
Dept: PHYSICAL THERAPY | Facility: CLINIC | Age: 84
Setting detail: THERAPIES SERIES
End: 2019-08-05
Attending: PEDIATRICS
Payer: COMMERCIAL

## 2019-08-05 DIAGNOSIS — M17.0 ARTHRITIS OF BOTH KNEES: ICD-10-CM

## 2019-08-05 PROCEDURE — 97110 THERAPEUTIC EXERCISES: CPT | Mod: GP | Performed by: PHYSICAL MEDICINE & REHABILITATION

## 2019-08-05 PROCEDURE — 97161 PT EVAL LOW COMPLEX 20 MIN: CPT | Mod: GP | Performed by: PHYSICAL MEDICINE & REHABILITATION

## 2019-08-05 NOTE — PROGRESS NOTES
08/05/19 1500   General Information   Type of Visit Initial OP Ortho PT Evaluation   Start of Care Date 08/05/19   Referring Physician Nivia Sandra MD   Patient/Family Goals Statement improve strength, amb and balance   Orders Evaluate and Treat   Date of Order 08/02/19   Certification Required? No  ([facility]VANTAGE [facility])   Medical Diagnosis Arthritis of both knees   Surgical/Medical history reviewed Yes   Precautions/Limitations no known precautions/limitations   General Information Comments PMHx per pt report: diabetes, high BP, arthritis. surgery on gall bladder, bunion, and appendix   Body Part(s)   Body Part(s) Knee   Presentation and Etiology   Pertinent history of current problem (include personal factors and/or comorbidities that impact the POC) Pt arrived to PT today for B knee pain. Feels better with R leg crossed over L. Notes pain is worse when legs not crossed. Pt accompanied to PT by daughter who helps assist as historian. Notes pain initially started 6 months ago. Notes pain is located in B knees with radiation into B lower legs. Notes was diagnosis with diabetes recently that impaired strength and pt was in pain limiting mobility per daughter. Daughter also shared pt goes to adult  that helps with mobility. Daughter helps put on brace and cream to assist with pain--but has had minimal relief of sx. Has injections scheduled for upcoming Thursday.    Impairments A. Pain;C. Swelling;F. Decreased strength and endurance;H. Impaired gait   Functional Limitations perform activities of daily living   Symptom Location B knees and lower legs   How/Where did it occur From insidious onset   Onset date of current episode/exacerbation 02/05/19   Chronicity Chronic   Pain rating (0-10 point scale) Best (/10);Worst (/10)   Best (/10) 3   Worst (/10) 9   Pain quality A. Sharp;C. Aching   Frequency of pain/symptoms A. Constant   Pain/symptoms are: The same all the time   Pain/symptoms exacerbated by  B. Walking;G. Certain positions;J. ADL   Pain/symptoms eased by C. Rest;F. Certain positions;I. OTC medication(s)   Progression of symptoms since onset: Worsened   Current / Previous Interventions   Diagnostic Tests: X-ray   X-ray Results Results   X-ray results R knee X-Ray=Interval progression of medial and lateral joint space narrowing which is now moderate to severe. Associated marginal endplate spurring is also noted at the medial and lateral compartments as well as the patellofemoral joint. B knee X-Ray= Bilateral osteoarthritis with right knee severe lateral compartment and left knee severe medial compartment joint space narrowing. Right knee moderate medial compartment joint space narrowing is also noted. There are small joint effusions, right greater than left.   Prior Level of Function   Prior Level of Function-Mobility SPC and walker   Prior Level of Function-ADLs has grandson and daughter who help take care of pt   Current Level of Function   Current Community Support Family/friend caregiver  (has grandson who helps take care of pt)   Patient role/employment history F. Retired   [facility]   Home/community accessibility pt shared she has steps at home but does not travel down into basement. Has ramp to get into home   Current equipment-Gait/Locomotion Walker;Standard cane   Fall Risk Screen   Fall screen completed by PT   Have you fallen 2 or more times in the past year? No   Have you fallen and had an injury in the past year? No   Is patient a fall risk? Yes;Department fall risk interventions implemented   Fall screen comments Although pt has no hx of falls, pt demonstrates impaired gait safety and requies HHA or furniture walk in addition to AD. PT will assess TUG at upcoming sessions as appropriate/able.    Abuse Screen (yes response referral indicated)   Feels Unsafe at Home or Work/School no   Feels Threatened by Someone no   Does Anyone Try to Keep You From Having Contact  with Others or Doing Things Outside Your Home? no   Physical Signs of Abuse Present no   Functional Scales   Functional Scales Other   Other Scales  LEFS: 24/80   Knee Objective Findings   Side (if bilateral, select both right and left) Right  (and Left)   Observation pt required min-mod A with bed mobility   Integumentary  edema of B LE   Posture forward head and rounded shoulders B   Gait/Locomotion pt amb with SPC and uses HHA with daughter or furniture walks   Balance/Proprioception (Single Leg Stance) unable   Foot Position In Standing increased pronation B   Knee/Hip Strength Comments Hip flexion: 4/5 B, seated hip abd: 5/5 B (noted pain on L), seated hip adduction: 4/5 B (pain in B knees), Hip IR and ER: 3+/5 each B   Step Test Height unable   Step Test Height Control Comment unable due to weakness and poor balance   Lachmans Test normal B   Anterior Drawer Test normal B   Posterior Drawer Test normal B   Varus Stress Test normal B   Valgus Stress Test normal B   Jj's Test normal B   Palpation R knee= pain located along medial and lateral jt line, ITB and adductor insertion. L Knee: hamstrings, patellar T, medial and lateral jt line, VMO, ITB   Accessory Motion/Joint Mobility hypomobility of tibiofemoral jt and patellofemoral jts in all directions B   Right Knee Extension AROM lacking 12* on R (pain); 0* on L   Right Knee Extension PROM lacking 10* on R (pain); 0* on L   Right Knee Flexion AROM 101* on R (pain); 106* on L   Right Knee Flexion PROM 102* on R (pain); 110* on L   Right Knee Flexion Strength 5-/5 B   Right Knee Extension Strength 5-/5 B   Right Hip Abduction Strength seated: 5/5 B (noted pain in L knee)   Right Quad Set Strength fair   R VMO Strength poor   Right Gastrocnemius Flexibility limited B   Right Hamstring Flexibility limited B   Right Hip Flexor Flexibility limited B   Right Quadricep Flexibility limited B   Planned Therapy Interventions   Planned Therapy Interventions ADL  retraining;IADL retraining;balance training;bed mobility training;gait training;joint mobilization;manual therapy;motor coordination training;neuromuscular re-education;orthotic fitting/training;ROM;strengthening;stretching;transfer training   Planned Modality Interventions   Planned Modality Interventions Biofeedback;Contrast bath immersion;Cryotherapy;Electrical stimulation;Hot packs;Hydrotherapy;Iontophoresis;Low level laser therapy;Shortwave diathermy;TENS;Traction;Ultrasound   Clinical Impression   Criteria for Skilled Therapeutic Interventions Met yes, treatment indicated   PT Diagnosis chronic B knee pain   Influenced by the following impairments decreased ROM, decreased strength, impaired gait, impaired balance, pain   Functional limitations due to impairments walking, transfers, bed mobility   Clinical Presentation Stable/Uncomplicated   Clinical Presentation Rationale chronicity of sx, high motivation, multiple jt involvement, ROM, strength   Clinical Decision Making (Complexity) Low complexity   Therapy Frequency 2 times/Week   Predicted Duration of Therapy Intervention (days/wks) 10 weeks   Risk & Benefits of therapy have been explained Yes   Patient, Family & other staff in agreement with plan of care Yes   Clinical Impression Comments Pt is a 89 y.o. female who presented to the PT clinic today with a rehab diagnosis of chronic B knee pain as evidenced by decreased ROM, decreased strength, impaired gait, impaired balance, and pain. Pt is appropriate for skilled PT to address previously listed impairments in order to decrease difficulty with walking, transfers and bed mobility.   Education Assessment   Preferred Learning Style Listening;Reading;Demonstration;Pictures/video   Barriers to Learning No barriers  (pt has some difficulty with hearing)   ORTHO GOALS   PT Ortho Eval Goals 1;2;3;4   Ortho Goal 1   Goal Identifier 1   Goal Description Pt will be able to perform sit <> stand transfer demonstrating  good eccentric quad control   Target Date 09/16/19   Ortho Goal 2   Goal Identifier 2   Goal Description Pt will be able to perform supine bridge in order to decrease difficulty with bed mobility.    Target Date 09/30/19   Ortho Goal 3   Goal Identifier 3   Goal Description Pt will be independent with HEP in order to self manage symptoms.    Target Date 10/18/19   Ortho Goal 4   Goal Identifier 4   Goal Description Pt will be able to amb 50' using AD and without HHA /furniture walking in order to decrease difficulty with home navigation.    Target Date 10/18/19   Total Evaluation Time   PT Abdoulaye Low Complexity Minutes (07252) 25       Please contact me with any questions or concerns.    Thank you for your referral,     Alyssa Ascencio, PT, DPT  Physical Therapist  [facility] [address] [address] [phone]

## 2019-08-07 ENCOUNTER — HOSPITAL ENCOUNTER (OUTPATIENT)
Dept: PHYSICAL THERAPY | Facility: CLINIC | Age: 84
Setting detail: THERAPIES SERIES
End: 2019-08-07
Attending: PEDIATRICS
Payer: COMMERCIAL

## 2019-08-07 PROCEDURE — 97110 THERAPEUTIC EXERCISES: CPT | Mod: GP | Performed by: PHYSICAL MEDICINE & REHABILITATION

## 2019-08-08 ENCOUNTER — OFFICE VISIT (OUTPATIENT)
Dept: ORTHOPEDICS | Facility: CLINIC | Age: 84
End: 2019-08-08
Payer: COMMERCIAL

## 2019-08-08 VITALS — WEIGHT: 207 LBS | HEIGHT: 66 IN | BODY MASS INDEX: 33.27 KG/M2

## 2019-08-08 DIAGNOSIS — G89.29 CHRONIC PAIN OF BOTH KNEES: ICD-10-CM

## 2019-08-08 DIAGNOSIS — M25.562 CHRONIC PAIN OF BOTH KNEES: ICD-10-CM

## 2019-08-08 DIAGNOSIS — M25.561 CHRONIC PAIN OF BOTH KNEES: ICD-10-CM

## 2019-08-08 DIAGNOSIS — M17.0 ARTHRITIS OF BOTH KNEES: Primary | ICD-10-CM

## 2019-08-08 PROCEDURE — 20611 DRAIN/INJ JOINT/BURSA W/US: CPT | Mod: 50 | Performed by: FAMILY MEDICINE

## 2019-08-08 RX ORDER — ROPIVACAINE HYDROCHLORIDE 5 MG/ML
3 INJECTION, SOLUTION EPIDURAL; INFILTRATION; PERINEURAL
Status: DISCONTINUED | OUTPATIENT
Start: 2019-08-08 | End: 2020-01-07 | Stop reason: ALTCHOICE

## 2019-08-08 RX ORDER — TRIAMCINOLONE ACETONIDE 40 MG/ML
40 INJECTION, SUSPENSION INTRA-ARTICULAR; INTRAMUSCULAR
Status: DISCONTINUED | OUTPATIENT
Start: 2019-08-08 | End: 2020-01-07 | Stop reason: ALTCHOICE

## 2019-08-08 RX ADMIN — ROPIVACAINE HYDROCHLORIDE 3 ML: 5 INJECTION, SOLUTION EPIDURAL; INFILTRATION; PERINEURAL at 11:30

## 2019-08-08 RX ADMIN — TRIAMCINOLONE ACETONIDE 40 MG: 40 INJECTION, SUSPENSION INTRA-ARTICULAR; INTRAMUSCULAR at 11:30

## 2019-08-08 ASSESSMENT — MIFFLIN-ST. JEOR: SCORE: 1357.76

## 2019-08-08 NOTE — LETTER
2019         RE: Adela Means  [address] [facility] [address]        Dear Colleague,    Thank you for referring your patient, Adela Means, to the [facility]. Please see a copy of my visit note below.    Adela Means  :  3/10/1927  DOS: 2019  MRN: [id number]    Sports Medicine Clinic Procedure    Ultrasound Guided Bilateral Intra-Articular Knee Aspiration & Injection    Clinical History: Chronic bilateral knee pain over the past ~ 6 months.    Diagnosis:   1. Arthritis of both knees      Referring Physician: Nivia Sandra MD  Large Joint Injection/Arthocentesis: bilateral knee  Date/Time: 2019 11:30 AM  Performed by: Juan J Caballero DO  Authorized by: Juan J Caballero DO     Indications:  Joint swelling and osteoarthritis  Needle Size:  21 G  Guidance: ultrasound    Approach:  Superolateral  Location:  Knee  Laterality:  Bilateral      Medications (Right):  40 mg triamcinolone 40 MG/ML; 3 mL ropivacaine 5 MG/ML  Aspirate amount (mL):  10  Aspirate:  Serous and yellow  Medications (Left):  40 mg triamcinolone 40 MG/ML; 3 mL ropivacaine 5 MG/ML  Outcome:  Tolerated well, no immediate complications  Procedure discussed: discussed risks, benefits, and alternatives    Consent Given by:  Patient  Timeout: timeout called immediately prior to procedure    Prep: patient was prepped and draped in usual sterile fashion        Impression:  Successful Bilateral intra-articular knee aspiration and injection.    Plan:  Follow up as directed with Dr Sandra  Expectations and goals of CSI reviewed  Often 2-3 days for steroid effect, and can take up to two weeks for maximum effect  We discussed modified progressive pain-free activity as tolerated  Do not overuse in first two weeks if feeling better due to concern for vulnerability while steroid is working  Supportive care reviewed  All questions were answered today  Contact us with additional  questions or concerns  Signs and sx of concern reviewed      Juan J Caballero DO, CAQ  Primary Care Sports Medicine  [facility]         Again, thank you for allowing me to participate in the care of your patient.        Sincerely,        Juan J Caballero DO

## 2019-08-08 NOTE — PROGRESS NOTES
Adela Means  :  3/10/1927  DOS: 2019  MRN: [id number]    Sports Medicine Clinic Procedure    Ultrasound Guided Bilateral Intra-Articular Knee Aspiration & Injection    Clinical History: Chronic bilateral knee pain over the past ~ 6 months.    Diagnosis:   1. Arthritis of both knees      Referring Physician: Nivia Sandra MD  Large Joint Injection/Arthocentesis: bilateral knee  Date/Time: 2019 11:30 AM  Performed by: Juan J Caballero DO  Authorized by: Juan J Caballero DO     Indications:  Joint swelling and osteoarthritis  Needle Size:  21 G  Guidance: ultrasound    Approach:  Superolateral  Location:  Knee  Laterality:  Bilateral      Medications (Right):  40 mg triamcinolone 40 MG/ML; 3 mL ropivacaine 5 MG/ML  Aspirate amount (mL):  10  Aspirate:  Serous and yellow  Medications (Left):  40 mg triamcinolone 40 MG/ML; 3 mL ropivacaine 5 MG/ML  Outcome:  Tolerated well, no immediate complications  Procedure discussed: discussed risks, benefits, and alternatives    Consent Given by:  Patient  Timeout: timeout called immediately prior to procedure    Prep: patient was prepped and draped in usual sterile fashion        Impression:  Successful Bilateral intra-articular knee aspiration and injection.    Plan:  Follow up as directed with Dr Sandra  Expectations and goals of CSI reviewed  Often 2-3 days for steroid effect, and can take up to two weeks for maximum effect  We discussed modified progressive pain-free activity as tolerated  Do not overuse in first two weeks if feeling better due to concern for vulnerability while steroid is working  Supportive care reviewed  All questions were answered today  Contact us with additional questions or concerns  Signs and sx of concern reviewed      Juan J Caballero DO, CAQ  Primary Care Sports Medicine  [facility]

## 2019-08-12 ENCOUNTER — HOSPITAL ENCOUNTER (OUTPATIENT)
Dept: PHYSICAL THERAPY | Facility: CLINIC | Age: 84
Setting detail: THERAPIES SERIES
End: 2019-08-12
Attending: PEDIATRICS
Payer: COMMERCIAL

## 2019-08-12 PROCEDURE — 97110 THERAPEUTIC EXERCISES: CPT | Mod: GP | Performed by: PHYSICAL MEDICINE & REHABILITATION

## 2019-08-19 ENCOUNTER — HOSPITAL ENCOUNTER (OUTPATIENT)
Dept: PHYSICAL THERAPY | Facility: CLINIC | Age: 84
Setting detail: THERAPIES SERIES
End: 2019-08-19
Attending: PEDIATRICS
Payer: COMMERCIAL

## 2019-08-19 PROCEDURE — 97110 THERAPEUTIC EXERCISES: CPT | Mod: GP | Performed by: PHYSICAL MEDICINE & REHABILITATION

## 2019-08-19 PROCEDURE — 97112 NEUROMUSCULAR REEDUCATION: CPT | Mod: GP | Performed by: PHYSICAL MEDICINE & REHABILITATION

## 2019-08-23 DIAGNOSIS — I10 BENIGN ESSENTIAL HYPERTENSION: ICD-10-CM

## 2019-08-23 NOTE — TELEPHONE ENCOUNTER
"Requested Prescriptions   Pending Prescriptions Disp Refills     EQ ASPIRIN ADULT LOW DOSE 81 MG EC tablet [Pharmacy Med Name: ASPIRIN LOW EC 81MG TAB] 30 tablet 11     Sig: TAKE 1 TABLET BY MOUTH ONCE DAILY       Analgesics (Non-Narcotic Tylenol and ASA Only) Passed - 8/23/2019  3:57 PM        Passed - Recent (12 mo) or future (30 days) visit within the authorizing provider's specialty     Patient had office visit in the last 12 months or has a visit in the next 30 days with authorizing provider or within the authorizing provider's specialty.  See \"Patient Info\" tab in inbasket, or \"Choose Columns\" in Meds & Orders section of the refill encounter.              Passed - Patient is age 20 years or older     If ASA is flagged for ages under 20 years old. Forward to provider for confirmation Ryes Syndrome is not a concern.              Passed - Medication is active on med list        Look like this is a dupilcation from 4/9/19    "

## 2019-08-26 ENCOUNTER — HOSPITAL ENCOUNTER (OUTPATIENT)
Dept: PHYSICAL THERAPY | Facility: CLINIC | Age: 84
Setting detail: THERAPIES SERIES
End: 2019-08-26
Attending: PEDIATRICS
Payer: COMMERCIAL

## 2019-08-26 DIAGNOSIS — E11.65 TYPE 2 DIABETES MELLITUS WITH HYPERGLYCEMIA, WITHOUT LONG-TERM CURRENT USE OF INSULIN (H): ICD-10-CM

## 2019-08-26 DIAGNOSIS — E03.9 ACQUIRED HYPOTHYROIDISM: Chronic | ICD-10-CM

## 2019-08-26 LAB
HBA1C MFR BLD: 8.5 % (ref 0–5.6)
TSH SERPL DL<=0.005 MIU/L-ACNC: 3.17 MU/L (ref 0.4–4)

## 2019-08-26 PROCEDURE — 97110 THERAPEUTIC EXERCISES: CPT | Mod: GP | Performed by: PHYSICAL MEDICINE & REHABILITATION

## 2019-08-26 PROCEDURE — 83036 HEMOGLOBIN GLYCOSYLATED A1C: CPT | Performed by: NURSE PRACTITIONER

## 2019-08-26 PROCEDURE — 36415 COLL VENOUS BLD VENIPUNCTURE: CPT | Performed by: NURSE PRACTITIONER

## 2019-08-26 PROCEDURE — 84443 ASSAY THYROID STIM HORMONE: CPT | Performed by: NURSE PRACTITIONER

## 2019-08-26 PROCEDURE — 97112 NEUROMUSCULAR REEDUCATION: CPT | Mod: GP | Performed by: PHYSICAL MEDICINE & REHABILITATION

## 2019-08-26 RX ORDER — HYDROGEN PEROXIDE 2.65 ML/100ML
LIQUID ORAL; TOPICAL
Qty: 90 TABLET | Refills: 3 | Status: SHIPPED | OUTPATIENT
Start: 2019-08-26 | End: 2020-02-28

## 2019-08-26 NOTE — TELEPHONE ENCOUNTER
04-09-19 ASA listed as OTC.  Refill.  LM for dtr to call clinic nurse- pt due for lab only appt- A1C, TSH.  SPENCER Keith RN

## 2019-08-27 DIAGNOSIS — E11.9 TYPE 2 DIABETES MELLITUS WITHOUT COMPLICATION, WITHOUT LONG-TERM CURRENT USE OF INSULIN (H): Chronic | ICD-10-CM

## 2019-08-27 DIAGNOSIS — I10 BENIGN ESSENTIAL HYPERTENSION: ICD-10-CM

## 2019-08-27 RX ORDER — GLIPIZIDE 5 MG/1
15 TABLET, FILM COATED, EXTENDED RELEASE ORAL DAILY
Qty: 90 TABLET | Refills: 2 | Status: SHIPPED | OUTPATIENT
Start: 2019-08-27 | End: 2019-12-27

## 2019-08-27 NOTE — RESULT ENCOUNTER NOTE
TSH- normal. Continue current dose of Levothyroxine without change.  HgbA1c- this is coming down. Goal is <8.0. Will Increase Glipizde ER from 10mg daily to 15mg daily. Repeat HgbA1c in 3 months with an office visit- we will do your Medicare Annual Wellness check then too.  Please call her with these results. Send a hard copy for their records.   Thanks. EVELIA Buckley

## 2019-09-03 ENCOUNTER — HOSPITAL ENCOUNTER (OUTPATIENT)
Dept: PHYSICAL THERAPY | Facility: CLINIC | Age: 84
Setting detail: THERAPIES SERIES
End: 2019-09-03
Attending: PEDIATRICS
Payer: COMMERCIAL

## 2019-09-03 DIAGNOSIS — M85.80 OSTEOPENIA: ICD-10-CM

## 2019-09-03 PROCEDURE — 97112 NEUROMUSCULAR REEDUCATION: CPT | Mod: GP | Performed by: PHYSICAL MEDICINE & REHABILITATION

## 2019-09-03 PROCEDURE — 97110 THERAPEUTIC EXERCISES: CPT | Mod: GP | Performed by: PHYSICAL MEDICINE & REHABILITATION

## 2019-09-03 RX ORDER — CHOLECALCIFEROL (VITAMIN D3) 25 MCG
TABLET ORAL
Qty: 90 TABLET | Refills: 1 | Status: SHIPPED | OUTPATIENT
Start: 2019-09-03 | End: 2020-02-28

## 2019-09-03 NOTE — TELEPHONE ENCOUNTER
"Requested Prescriptions   Pending Prescriptions Disp Refills     VITAMIN D3 1000 units tablet [Pharmacy Med Name: VITAMIN D3(KEITH) 1,000 IU TAB] 90 tablet 1     Sig: TAKE 1 TABLET BY MOUTH ONCE DAILY       Vitamin Supplements (Adult) Protocol Passed - 9/3/2019  3:16 PM        Passed - High dose Vitamin D not ordered        Passed - Recent (12 mo) or future (30 days) visit within the authorizing provider's specialty     Patient had office visit in the last 12 months or has a visit in the next 30 days with authorizing provider or within the authorizing provider's specialty.  See \"Patient Info\" tab in inbasket, or \"Choose Columns\" in Meds & Orders section of the refill encounter.              Passed - Medication is active on med list        Last Written Prescription Date:  7/22/19  Last Fill Quantity: 100,  # refills: 1   Last office visit: 8/2/2019 with prescribing provider:     Future Office Visit:      "

## 2019-09-10 ENCOUNTER — HOSPITAL ENCOUNTER (OUTPATIENT)
Dept: PHYSICAL THERAPY | Facility: CLINIC | Age: 84
Setting detail: THERAPIES SERIES
End: 2019-09-10
Attending: PEDIATRICS
Payer: COMMERCIAL

## 2019-09-10 PROCEDURE — 97112 NEUROMUSCULAR REEDUCATION: CPT | Mod: GP | Performed by: PHYSICAL MEDICINE & REHABILITATION

## 2019-09-10 PROCEDURE — 97110 THERAPEUTIC EXERCISES: CPT | Mod: GP | Performed by: PHYSICAL MEDICINE & REHABILITATION

## 2019-09-10 NOTE — PROGRESS NOTES
"Outpatient Physical Therapy Discharge Note     Patient: Adela Means  : 3/10/1927    Beginning/End Dates of Reporting Period:  2019 to 9/10/2019    Referring Provider: Nivia Sandra MD    Therapy Diagnosis: chronic B knee pain     Client Self Report: Pt reports legs feel good today. Notes she can tell when bad weather is coming but otherwise painfree. Shared she started using home bike in the last week. Notes HEP going well at home, no increase in pain with exercises. Has daughter near by when performing balance exercises.     Objective Measurements:  Objective Measure: B knee ROM  Details: R=114*, L=116* (no pain)  Objective Measure: B knee/hip strength  Details: all motions 5/5 B (no pain)  Objective Measure: bed mobility observation  Details: pt able to raise buttocks 6\" during supine bridge today (unable to lift buttocks off surface at initial eval)  Objective Measure: TUG (with use of 4ww)  Details: last assessed 2019: 13 seconds (18 seconds at start of therapy plan of care)    Outcome Measures (most recent score):  LEFS: 36/80 (24/80 at initial eval)    Goals:  Goal Identifier 1   Goal Description Pt will be able to perform sit <> stand transfer demonstrating good eccentric quad control   Target Date 19   Date Met  19   Progress:     Goal Identifier 2   Goal Description Pt will be able to perform supine bridge in order to decrease difficulty with bed mobility.    Target Date 19   Date Met  09/10/19   Progress:     Goal Identifier 3   Goal Description Pt will be independent with HEP in order to self manage symptoms.    Target Date 10/18/19   Date Met  09/10/19   Progress:     Goal Identifier 4   Goal Description Pt will be able to amb 50' using AD and without HHA /furniture walking in order to decrease difficulty with home navigation.    Target Date 10/18/19   Date Met  19   Progress:     Progress Toward Goals:   Progress this reporting period: Pt attended 7 PT " sessions, achieving 4/4 short term and long term goals. Pt's ROM, strength, balance and mobility have improved since starting PT. Pt is appropriate for D/C at this time as she has met all goals and is independent with HEP.     Plan:  Discharge from therapy.    Discharge:    Reason for Discharge: Patient has met all goals.  Patient chooses to discontinue therapy.    Equipment Issued: therabands    Discharge Plan: Patient to continue home program.    Please contact me with any questions or concerns.    Thank you for your referral,     Alyssa Ascencio, PT, DPT  Physical Therapist  [facility] - [address] [address] [phone]

## 2019-09-18 ENCOUNTER — OFFICE VISIT (OUTPATIENT)
Dept: FAMILY MEDICINE | Facility: CLINIC | Age: 84
End: 2019-09-18
Payer: COMMERCIAL

## 2019-09-18 VITALS
TEMPERATURE: 98.9 F | BODY MASS INDEX: 34.09 KG/M2 | OXYGEN SATURATION: 94 % | HEART RATE: 66 BPM | WEIGHT: 208 LBS | SYSTOLIC BLOOD PRESSURE: 124 MMHG | DIASTOLIC BLOOD PRESSURE: 80 MMHG | RESPIRATION RATE: 24 BRPM

## 2019-09-18 DIAGNOSIS — E11.9 TYPE 2 DIABETES MELLITUS WITHOUT COMPLICATION, WITHOUT LONG-TERM CURRENT USE OF INSULIN (H): ICD-10-CM

## 2019-09-18 DIAGNOSIS — Z00.00 MEDICARE ANNUAL WELLNESS VISIT, SUBSEQUENT: Primary | ICD-10-CM

## 2019-09-18 DIAGNOSIS — E03.9 ACQUIRED HYPOTHYROIDISM: ICD-10-CM

## 2019-09-18 DIAGNOSIS — H61.23 BILATERAL IMPACTED CERUMEN: ICD-10-CM

## 2019-09-18 DIAGNOSIS — I10 BENIGN ESSENTIAL HYPERTENSION: ICD-10-CM

## 2019-09-18 PROCEDURE — 69209 REMOVE IMPACTED EAR WAX UNI: CPT | Mod: 50 | Performed by: FAMILY MEDICINE

## 2019-09-18 PROCEDURE — 99397 PER PM REEVAL EST PAT 65+ YR: CPT | Mod: 25 | Performed by: FAMILY MEDICINE

## 2019-09-18 ASSESSMENT — PAIN SCALES - GENERAL: PAINLEVEL: NO PAIN (0)

## 2019-09-18 NOTE — NURSING NOTE
"Chief Complaint   Patient presents with     Ear Problem     bilateral ear pain     /80   Pulse 66   Temp 98.9  F (37.2  C) (Tympanic)   Resp 24   Wt 94.3 kg (208 lb)   SpO2 94%   BMI 34.09 kg/m   Estimated body mass index is 34.09 kg/m  as calculated from the following:    Height as of 8/8/19: 1.664 m (5' 5.5\").    Weight as of this encounter: 94.3 kg (208 lb).  Patient presents to the clinic using Cane      Health Maintenance that is potentially due pending provider review:    Health Maintenance Due   Topic Date Due     EYE EXAM  03/10/1927     MEDICARE ANNUAL WELLNESS VISIT  03/10/1992     ZOSTER IMMUNIZATION (2 of 3) 11/06/2015     ADVANCE CARE PLANNING  02/21/2017     DTAP/TDAP/TD IMMUNIZATION (3 - Td) 10/01/2018     INFLUENZA VACCINE (1) 09/01/2019        Possibly completing today per provider review.        "

## 2019-09-18 NOTE — PATIENT INSTRUCTIONS
Preventive Health Recommendations    See your health care provider every year to    Review health changes.     Discuss preventive care.      Review your medicines if your doctor has prescribed any.    You no longer need a yearly Pap test unless you've had an abnormal Pap test in the past 10 years. If you have vaginal symptoms, such as bleeding or discharge, be sure to talk with your provider about a Pap test.    Every 1 to 2 years, have a mammogram.  If you are over 69, talk with your health care provider about whether or not you want to continue having screening mammograms.    Every 10 years, have a colonoscopy. Or, have a yearly FIT test (stool test). These exams will check for colon cancer.     Have a cholesterol test every 5 years, or more often if your doctor advises it.     Have a diabetes test (fasting glucose) every three years. If you are at risk for diabetes, you should have this test more often.     At age 65, have a bone density scan (DEXA) to check for osteoporosis (brittle bone disease).    Shots:    Get a flu shot each year.    Get a tetanus shot every 10 years.    Talk to your doctor about your pneumonia vaccines. There are now two you should receive - Pneumovax (PPSV 23) and Prevnar (PCV 13).    Talk to your pharmacist about the shingles vaccine.    Talk to your doctor about the hepatitis B vaccine.    Nutrition:     Eat at least 5 servings of fruits and vegetables each day.    Eat whole-grain bread, whole-wheat pasta and brown rice instead of white grains and rice.    Get adequate Calcium and Vitamin D.     Lifestyle    Exercise at least 150 minutes a week (30 minutes a day, 5 days a week). This will help you control your weight and prevent disease.    Limit alcohol to one drink per day.    No smoking.     Wear sunscreen to prevent skin cancer.     See your dentist twice a year for an exam and cleaning.    See your eye doctor every 1 to 2 years to screen for conditions such as glaucoma, macular  degeneration and cataracts.    Personalized Prevention Plan  You are due for the preventive services outlined below.  Your care team is available to assist you in scheduling these services.  If you have already completed any of these items, please share that information with your care team to update in your medical record.  Health Maintenance Due   Topic Date Due     Eye Exam  03/10/1927     Annual Wellness Visit  03/10/1992     Zoster (Shingles) Vaccine (2 of 3) 11/06/2015     Discuss Advance Care Planning  02/21/2017     Diptheria Tetanus Pertussis (DTAP/TDAP/TD) Vaccine (3 - Td) 10/01/2018     Flu Vaccine (1) 09/01/2019

## 2019-09-18 NOTE — PROGRESS NOTES
"SUBJECTIVE   Adela Means is a 89 year old female who presents with     Ears plugged  Annual Wellness Visit    Are you in the first 12 months of your Medicare Part B coverage?  No    Physical Health:    In general, how would you rate your overall physical health? fair    If you drink alcohol do you typically have >3 drinks per day or >7 drinks per week? No    Do you usually eat at least 4 servings of fruit and vegetables a day, include whole grains & fiber and avoid regularly eating high fat or \"junk\" foods? Yes    Needs assistance for the following daily activities: transportation, shopping, preparing meals and housework    Which of the following safety concerns are present in your home?  throw rugs in the hallway     Hearing impairment: Yes, Difficulty following a conversation in a noisy restaurant or crowded room.    In the past 6 months, have you been bothered by leaking of urine? no    Mental Health:    In general, how would you rate your overall mental or emotional health? fair  PHQ-2 Score:      Do you feel safe in your environment? Yes    Do you have a Health Care Directive? No: Advance care planning reviewed with patient; information given to patient to review.    Fall risk:  Fallen 2 or more times in the past year?: No  Any fall with injury in the past year?: No    Cognitive Screenin) Repeat 3 items (Leader, Season, Table)    2) Clock draw: NORMAL  3) 3 item recall: Recalls NO objects   Results: 0 items recalled: PROBABLE COGNITIVE IMPAIRMENT, **INFORM PROVIDER** Clock normal    Mini-CogTM Copyright MURPHY Barrios. Licensed by the author for use in [facility]; reprinted with permission (alex@.[facility]). All rights reserved.      Current providers sharing in care for this patient include:   Patient Care Team:  Yoselyn Castrejon CNP as PCP - General (Nurse Practitioner - Family)  Yoselyn Castrejon CNP as Assigned PCP        Do you have sleep apnea, excessive snoring or daytime " drowsiness?: yes snore    PCP   Yoselyn Castrejon, [facility] [phone]    Health Maintenance        Health Maintenance Due   Topic Date Due     EYE EXAM  03/10/1927     MEDICARE ANNUAL WELLNESS VISIT  03/10/1992     ZOSTER IMMUNIZATION (2 of 3) 11/06/2015     ADVANCE CARE PLANNING  02/21/2017     DTAP/TDAP/TD IMMUNIZATION (3 - Td) 10/01/2018     INFLUENZA VACCINE (1) 09/01/2019       HPI        Patient Active Problem List   Diagnosis     Acquired hypothyroidism     Benign essential hypertension, BP <140/90     Knee pain     Hepatic cyst     Hypertriglyceridemia, LDL goal <100     Osteopenia     DJD (degenerative joint disease)     Gastritis     Advanced directives, counseling/discussion     Seborrheic dermatitis     Type 2 diabetes mellitus without complication, without long-term current use of insulin (H), HgbA1c goal <8     Current Outpatient Medications   Medication     ACE/ARB NOT PRESCRIBED, INTENTIONAL,     acetaminophen (TYLENOL) 500 MG tablet     aspirin (ASA) 81 MG tablet     blood glucose monitoring (PEREZ MICROLET) lancets     calcium carbonate (OS-KATHY 600 MG [place]. [place]) 600 MG tablet     clobetasol (TEMOVATE) 0.05 % external solution     EQ ASPIRIN ADULT LOW DOSE 81 MG EC tablet     furosemide (LASIX) 20 MG tablet     glipiZIDE (GLUCOTROL XL) 5 MG 24 hr tablet     ketoconazole (NIZORAL) 2 % external shampoo     levothyroxine (SYNTHROID/LEVOTHROID) 112 MCG tablet     omega-3 acid ethyl esters (LOVAZA) 1 g capsule     order for DME     vitamin D3 (VITAMIN D3) 1000 units (25 mcg) tablet     VITAMIN D3 1000 units tablet     Current Facility-Administered Medications   Medication     ropivacaine (NAROPIN) injection 3 mL     ropivacaine (NAROPIN) injection 3 mL     triamcinolone (KENALOG-40) injection 40 mg     triamcinolone (KENALOG-40) injection 40 mg       Patient Active Problem List   Diagnosis     Acquired hypothyroidism     Benign essential hypertension, BP <140/90     Knee pain     Hepatic cyst      Hypertriglyceridemia, LDL goal <100     Osteopenia     DJD (degenerative joint disease)     Gastritis     Advanced directives, counseling/discussion     Seborrheic dermatitis     Type 2 diabetes mellitus without complication, without long-term current use of insulin (H), HgbA1c goal <8     Past Surgical History:   Procedure Laterality Date     APPENDECTOMY  age 18     CHOLECYSTECTOMY, OPEN       HYSTERECTOMY, VAGINAL         Social History     Tobacco Use     Smoking status: Never Smoker     Smokeless tobacco: Never Used   Substance Use Topics     Alcohol use: No     No family history on file.      Current Outpatient Medications   Medication Sig Dispense Refill     ACE/ARB NOT PRESCRIBED, INTENTIONAL, Please choose reason not prescribed, below       acetaminophen (TYLENOL) 500 MG tablet Take 1-2 tablets (500-1,000 mg) by mouth every 8 hours as needed for mild pain 30 tablet 0     aspirin (ASA) 81 MG tablet Take 1 tablet (81 mg) by mouth daily 90 tablet 3     blood glucose monitoring (EverpurseET) lancets Use to test blood sugar 1 times daily 100 each 4     calcium carbonate (OS-KATHY 600 MG [place]. [place]) 600 MG tablet Take 1 tablet (600 mg) by mouth 2 times daily (with meals) 180 tablet 3     clobetasol (TEMOVATE) 0.05 % external solution Apply sparingly to affected area twice daily for 14 days.  Do not apply to face. 100 mL 1     EQ ASPIRIN ADULT LOW DOSE 81 MG EC tablet TAKE 1 TABLET BY MOUTH ONCE DAILY 90 tablet 3     furosemide (LASIX) 20 MG tablet Take 1 tablet (20 mg) by mouth daily 90 tablet 3     glipiZIDE (GLUCOTROL XL) 5 MG 24 hr tablet Take 3 tablets (15 mg) by mouth daily With breakfast. Repeat HgbA1c in 3 months. 90 tablet 2     ketoconazole (NIZORAL) 2 % external shampoo APPLY TO AFFECTED AREA(S) AND WASH OFF AFTER 5 MINUTES USE 2 TO 3 TIMES PER WEEK 120 mL 2     levothyroxine (SYNTHROID/LEVOTHROID) 112 MCG tablet TAKE 1 TABLET BY MOUTH ONCE DAILY IN THE MORNING ON EMPTY STOMACH, ONE HOUR BEFORE OTHER  MEDICATIONS. (Patient taking differently: Take 112 mcg by mouth every morning (before breakfast) TAKE 1 TABLET BY MOUTH ONCE DAILY IN THE MORNING ON EMPTY STOMACH, ONE HOUR BEFORE OTHER MEDICATIONS.) 90 tablet 3     omega-3 acid ethyl esters (LOVAZA) 1 g capsule Take 2 capsules (2 g) by mouth 2 times daily 120 capsule 11     order for DME Equipment being ordered: Diabetic shoes 1 Units 0     vitamin D3 (VITAMIN D3) 1000 units (25 mcg) tablet TAKE 1 TABLET BY MOUTH ONCE DAILY (Patient taking differently: Take 1,000 Units by mouth daily TAKE 1 TABLET BY MOUTH ONCE DAILY) 100 tablet 1     VITAMIN D3 1000 units tablet TAKE 1 TABLET BY MOUTH ONCE DAILY 90 tablet 1     Allergies   Allergen Reactions     Ibuprofen Swelling     Of lips     Vitamin C Swelling     Of lips, But is fine with orange juice     Recent Labs   Lab Test 08/26/19  1510 05/29/19  1021 04/09/19  1155 03/13/18  1055 02/25/17  1330  02/24/16  0910   A1C 8.5*  --  12.5*  --  7.3*  --   --    LDL  --   --  89 73  --   --  81   HDL  --   --  46* 40*  --   --  42*   TRIG  --   --  201* 165*  --   --  171*   ALT  --  43 41 38  --   --   --    CR  --  0.62 0.80 0.72  --    < >  --    GFRESTIMATED  --  78 64 76  --    < >  --    GFRESTBLACK  --  >90 74 >90  --    < >  --    POTASSIUM  --  4.4 4.3 4.2  --    < >  --    TSH 3.17  --  8.03* 3.36  --    < >  --     < > = values in this interval not displayed.      BP Readings from Last 3 Encounters:   09/18/19 124/80   08/02/19 (!) 162/82   07/30/19 (!) 162/80    Wt Readings from Last 3 Encounters:   09/18/19 94.3 kg (208 lb)   08/08/19 93.9 kg (207 lb)   08/02/19 93.9 kg (207 lb)                    Reviewed and updated:  Tobacco  Allergies  Meds  Med Hx  Surg Hx  Fam Hx  Soc Hx     ROS:  Constitutional, neuro, ENT, endocrine, pulmonary, cardiac, gastrointestinal, genitourinary, musculoskeletal, integument and psychiatric systems are negative, except as otherwise noted.    PHYSICAL EXAM   /80   Pulse 66    Temp 98.9  F (37.2  C) (Tympanic)   Resp 24   Wt 94.3 kg (208 lb)   SpO2 94%   BMI 34.09 kg/m    Body mass index is 34.09 kg/m .  GENERAL: alert, no distress and elderly  EYES: Eyes grossly normal to inspection, PERRL and conjunctivae and sclerae normal  HENT: normal cephalic/atraumatic, right ear: occluded with wax, left ear: occluded with wax, normal tympanic membrane bilaterally, oropharynx clear and oral mucous membranes moist  NECK: no adenopathy, no asymmetry, masses, or scars and thyroid normal to palpation  RESP: lungs clear to auscultation - no rales, rhonchi or wheezes  CV: regular rate and rhythm, normal S1 S2, no S3 or S4, no murmur, click or rub, no peripheral edema and peripheral pulses strong  ABDOMEN: soft, nontender, no hepatosplenomegaly, no masses and bowel sounds normal  MS: chronic OA changes, no joint swelling or skin discoloration noted  SKIN: no suspicious lesions or rashes  NEURO: Grossly intact    Lab Results   Component Value Date    A1C 8.5 08/26/2019    A1C 12.5 04/09/2019    A1C 7.3 02/25/2017     TSH   Date Value Ref Range Status   08/26/2019 3.17 0.40 - 4.00 mU/L Final   04/09/2019 8.03 (H) 0.40 - 4.00 mU/L Final   03/13/2018 3.36 0.40 - 4.00 mU/L Final   02/24/2017 3.34 0.40 - 4.00 mU/L Final   01/27/2016 3.43 0.40 - 4.00 mU/L Final     Assessment & Plan     (Z00.00) Medicare annual wellness visit, subsequent  (primary encounter diagnosis)  Comment: Stable currently, bilateral impacted cerumen cleaned with saline irrigation.  Patient would like to get flu influenza vaccine next month      (I10) Benign essential hypertension  Comment: Blood pressure within target goal of less than 140/90.  Continue furosemide 20 mg daily      (E11.9) Type 2 diabetes mellitus without complication, without long-term current use of insulin (H), HgbA1c goal <8  Comment: Last hemoglobin A1c 8.5, reasonably controlled, continue glipizide      (E03.9) Acquired hypothyroidism  Comment: Last TSH 3.17,  continue levothyroxine 112 mcg      (H61.23) Bilateral impacted cerumen  Comment: Bilateral impacted cerumen cleaned with saline irrigation by MA, no complication encountered.  Suggested to avoid using Q-tips         Patient Instructions     Preventive Health Recommendations    See your health care provider every year to    Review health changes.     Discuss preventive care.      Review your medicines if your doctor has prescribed any.    You no longer need a yearly Pap test unless you've had an abnormal Pap test in the past 10 years. If you have vaginal symptoms, such as bleeding or discharge, be sure to talk with your provider about a Pap test.    Every 1 to 2 years, have a mammogram.  If you are over 69, talk with your health care provider about whether or not you want to continue having screening mammograms.    Every 10 years, have a colonoscopy. Or, have a yearly FIT test (stool test). These exams will check for colon cancer.     Have a cholesterol test every 5 years, or more often if your doctor advises it.     Have a diabetes test (fasting glucose) every three years. If you are at risk for diabetes, you should have this test more often.     At age 65, have a bone density scan (DEXA) to check for osteoporosis (brittle bone disease).    Shots:    Get a flu shot each year.    Get a tetanus shot every 10 years.    Talk to your doctor about your pneumonia vaccines. There are now two you should receive - Pneumovax (PPSV 23) and Prevnar (PCV 13).    Talk to your pharmacist about the shingles vaccine.    Talk to your doctor about the hepatitis B vaccine.    Nutrition:     Eat at least 5 servings of fruits and vegetables each day.    Eat whole-grain bread, whole-wheat pasta and brown rice instead of white grains and rice.    Get adequate Calcium and Vitamin D.     Lifestyle    Exercise at least 150 minutes a week (30 minutes a day, 5 days a week). This will help you control your weight and prevent disease.    Limit  alcohol to one drink per day.    No smoking.     Wear sunscreen to prevent skin cancer.     See your dentist twice a year for an exam and cleaning.    See your eye doctor every 1 to 2 years to screen for conditions such as glaucoma, macular degeneration and cataracts.    Personalized Prevention Plan  You are due for the preventive services outlined below.  Your care team is available to assist you in scheduling these services.  If you have already completed any of these items, please share that information with your care team to update in your medical record.  Health Maintenance Due   Topic Date Due     Eye Exam  03/10/1927     Annual Wellness Visit  03/10/1992     Zoster (Shingles) Vaccine (2 of 3) 11/06/2015     Discuss Advance Care Planning  02/21/2017     Diptheria Tetanus Pertussis (DTAP/TDAP/TD) Vaccine (3 - Td) 10/01/2018     Flu Vaccine (1) 09/01/2019             Robin De Jesus MD  [facility]

## 2019-12-26 DIAGNOSIS — E11.9 TYPE 2 DIABETES MELLITUS WITHOUT COMPLICATION, WITHOUT LONG-TERM CURRENT USE OF INSULIN (H): Chronic | ICD-10-CM

## 2019-12-27 RX ORDER — GLIPIZIDE 5 MG/1
TABLET, FILM COATED, EXTENDED RELEASE ORAL
Qty: 30 TABLET | Refills: 0 | Status: SHIPPED | OUTPATIENT
Start: 2019-12-27 | End: 2020-01-03

## 2019-12-27 NOTE — TELEPHONE ENCOUNTER
See note below from Dr De Jesus on 9-18-19.  Deirdre Gunderson RN  Lab Results   Component Value Date    A1C 8.5 08/26/2019    A1C 12.5 04/09/2019    A1C 7.3 02/25/2017         (E11.9) Type 2 diabetes mellitus without complication, without long-term current use of insulin (H), HgbA1c goal <8  Comment: Last hemoglobin A1c 8.5, reasonably controlled, continue glipizide

## 2019-12-27 NOTE — TELEPHONE ENCOUNTER
"Requested Prescriptions   Pending Prescriptions Disp Refills     glipiZIDE (GLUCOTROL XL) 5 MG 24 hr tablet [Pharmacy Med Name: glipiZIDE ER 5 MG Oral Tablet Extended Release 24 Hour]  0     Sig: TAKE 3 TABLETS(15 MG) BY MOUTH ONCE DAILY WITH BREAKFAST REPEAT  HGBA1C  IN  3  MONTHS       Sulfonylurea Agents Failed - 12/26/2019  4:21 PM        Failed - Patient has documented A1c within the specified period of time.     If HgbA1C is 8 or greater, it needs to be on file within the past 3 months.  If less than 8, must be on file within the past 6 months.     Recent Labs   Lab Test 08/26/19  1510   A1C 8.5*             Passed - Blood pressure less than 140/90 in past 6 months     BP Readings from Last 3 Encounters:   09/18/19 124/80   08/02/19 (!) 162/82   07/30/19 (!) 162/80                 Passed - Patient has documented LDL within the past 12 mos.     Recent Labs   Lab Test 04/09/19  1155   LDL 89             Passed - Patient has had a Microalbumin in the past 15 mos.     Recent Labs   Lab Test 04/10/19  0900   MICROL 7   UMALCR 19.97             Passed - Medication is active on med list        Passed - Patient is age 18 or older        Passed - No active pregnancy on record        Passed - Patient has a recent creatinine (normal) within the past 12 mos.     Recent Labs   Lab Test 05/29/19  1021   CR 0.62             Passed - Patient has not had a positive pregnancy test within the past 12 mos.        Passed - Recent (6 mo) or future (30 days) visit within the authorizing provider's specialty     Patient had office visit in the last 6 months or has a visit in the next 30 days with authorizing provider or within the authorizing provider's specialty.  See \"Patient Info\" tab in inbasket, or \"Choose Columns\" in Meds & Orders section of the refill encounter.          glipiZIDE (GLUCOTROL XL) 5 MG 24 hr tablet  Last Written Prescription Date:  08/27/2019  Last Fill Quantity: 90 tablet,  # refills: 2   Last office visit: " 9/18/2019 with prescribing provider:  GABRIELA De Jesus   Future Office Visit:   Next 5 appointments (look out 90 days)    Jan 07, 2020  3:40 PM CST  Return Visit with Juan J Caballero DO  [facility] ([facility]) [address] [address]  [phone]         Elza SEGUNDO (CY) (M)

## 2020-01-03 ENCOUNTER — TELEPHONE (OUTPATIENT)
Dept: FAMILY MEDICINE | Facility: CLINIC | Age: 85
End: 2020-01-03

## 2020-01-03 DIAGNOSIS — E11.9 TYPE 2 DIABETES MELLITUS WITHOUT COMPLICATION, WITHOUT LONG-TERM CURRENT USE OF INSULIN (H): Chronic | ICD-10-CM

## 2020-01-03 PROBLEM — E11.65 TYPE 2 DIABETES MELLITUS WITH HYPERGLYCEMIA, WITHOUT LONG-TERM CURRENT USE OF INSULIN (H): Status: ACTIVE | Noted: 2017-03-13

## 2020-01-03 LAB — HBA1C MFR BLD: 9.1 % (ref 0–5.6)

## 2020-01-03 PROCEDURE — 83036 HEMOGLOBIN GLYCOSYLATED A1C: CPT | Performed by: NURSE PRACTITIONER

## 2020-01-03 PROCEDURE — 36415 COLL VENOUS BLD VENIPUNCTURE: CPT | Performed by: NURSE PRACTITIONER

## 2020-01-03 RX ORDER — GLIPIZIDE 5 MG/1
TABLET, FILM COATED, EXTENDED RELEASE ORAL
Qty: 90 TABLET | Refills: 2 | Status: SHIPPED | OUTPATIENT
Start: 2020-01-03 | End: 2020-02-19

## 2020-01-03 RX ORDER — GLIPIZIDE 5 MG/1
TABLET, FILM COATED, EXTENDED RELEASE ORAL
Qty: 30 TABLET | Refills: 0 | Status: SHIPPED | OUTPATIENT
Start: 2020-01-03 | End: 2020-01-03

## 2020-01-03 NOTE — TELEPHONE ENCOUNTER
Pt in for lab only A1C today  Component      Latest Ref Rng & Units 1/3/2020   Hemoglobin A1C      0 - 5.6 % 9.1 (H)     Will be out of glipizide tomorrow.  Forwarded to Yoselyn for lab review, further recommendations.   SPENCER Keith RN

## 2020-01-03 NOTE — LETTER
January 3, 2020      Adelamaddi Means  [address] [facility] [address]        Dear ,    We are writing to inform you of your test results.    HgbA1c is still elevated. Will refill Glipizide for 3 months, then you will need to come in for an office visit.     Resulted Orders   **A1C FUTURE 1yr   Result Value Ref Range    Hemoglobin A1C 9.1 (H) 0 - 5.6 %      Comment:      Normal <5.7% Prediabetes 5.7-6.4%  Diabetes 6.5% or higher - adopted from ADA   consensus guidelines.         If you have any questions or concerns, please call the clinic at the number listed above.       Sincerely,        Yoselyn Castrejon, HUGH/cb

## 2020-01-03 NOTE — TELEPHONE ENCOUNTER
HgbA1c is still elevated. Will refill Glipizide for 3 months, then you will need to come in for an office visit.  Lab result sent.   EVELIA Buckley

## 2020-01-03 NOTE — RESULT ENCOUNTER NOTE
HgbA1c is still elevated. Will refill Glipizide for 3 months, then you will need to come in for an office visit.  Please call her with these results. Send a hard copy for their records.   Thanks. EVELIA Buckley

## 2020-01-03 NOTE — TELEPHONE ENCOUNTER
Dtr informed of lab results, med refill,  recommendations per Yoselyn. States pt has cut down on sweets, carbs. SPENCER Keith RN

## 2020-01-07 ENCOUNTER — OFFICE VISIT (OUTPATIENT)
Dept: ORTHOPEDICS | Facility: CLINIC | Age: 85
End: 2020-01-07
Payer: COMMERCIAL

## 2020-01-07 VITALS
WEIGHT: 209 LBS | DIASTOLIC BLOOD PRESSURE: 75 MMHG | SYSTOLIC BLOOD PRESSURE: 161 MMHG | HEIGHT: 66 IN | BODY MASS INDEX: 33.59 KG/M2

## 2020-01-07 DIAGNOSIS — M25.561 CHRONIC PAIN OF BOTH KNEES: ICD-10-CM

## 2020-01-07 DIAGNOSIS — G89.29 CHRONIC PAIN OF BOTH KNEES: ICD-10-CM

## 2020-01-07 DIAGNOSIS — M17.0 ARTHRITIS OF BOTH KNEES: Primary | ICD-10-CM

## 2020-01-07 DIAGNOSIS — M25.562 CHRONIC PAIN OF BOTH KNEES: ICD-10-CM

## 2020-01-07 PROCEDURE — 99213 OFFICE O/P EST LOW 20 MIN: CPT | Mod: 25 | Performed by: FAMILY MEDICINE

## 2020-01-07 PROCEDURE — 20611 DRAIN/INJ JOINT/BURSA W/US: CPT | Mod: RT | Performed by: FAMILY MEDICINE

## 2020-01-07 RX ORDER — TRIAMCINOLONE ACETONIDE 40 MG/ML
40 INJECTION, SUSPENSION INTRA-ARTICULAR; INTRAMUSCULAR
Status: DISCONTINUED | OUTPATIENT
Start: 2020-01-07 | End: 2020-05-26

## 2020-01-07 RX ORDER — ROPIVACAINE HYDROCHLORIDE 5 MG/ML
3 INJECTION, SOLUTION EPIDURAL; INFILTRATION; PERINEURAL
Status: DISCONTINUED | OUTPATIENT
Start: 2020-01-07 | End: 2020-05-26

## 2020-01-07 RX ADMIN — TRIAMCINOLONE ACETONIDE 40 MG: 40 INJECTION, SUSPENSION INTRA-ARTICULAR; INTRAMUSCULAR at 14:00

## 2020-01-07 RX ADMIN — ROPIVACAINE HYDROCHLORIDE 3 ML: 5 INJECTION, SOLUTION EPIDURAL; INFILTRATION; PERINEURAL at 14:00

## 2020-01-07 ASSESSMENT — MIFFLIN-ST. JEOR: SCORE: 1366.83

## 2020-01-07 NOTE — LETTER
2020         RE: Adela Means  [address] [facility] [address]        Dear Colleague,    Thank you for referring your patient, Adela Means, to the [facility]. Please see a copy of my visit note below.    Adela Means  :  3/10/1927  DOS: 2020  MRN: [id number]    Sports Medicine Clinic Visit    PCP: Yoselyn Castrejon    Adela Means is a 89 year old female who is seen  as an ER referral presenting with bilteral knee pain    Injury: She reports chronic knee pain for 6 months. She has pain intermittently with walking. She reports swelling and pain in the anterior knee, right currently worse than left.    Location of Pain: bilateral knee pain  Duration of Pain: 6 month(s)  Rating of Pain at worst: 9/10  Rating of Pain Currently: 6/10  Symptoms are better with: Rest  Symptoms are worse with: walking  Additional Features:   Positive: swelling and weakness   Negative: bruising, popping, grinding, catching, locking, instability, paresthesias and numbness  Other evaluation and/or treatments so far consists of: Nothing  Prior History of related problems: nothing    Social History: retired    Interim History - 2020  Since last visit on 2019 patient has moderate bilateral knee pain and swelling.  Bilateral knee steriod injection completed on  provided good relief for ~ 4 months.  Patient desires repeat injection today.  No new injury in the interim.    Review of Systems  Skin: no bruising, yes swelling  Musculoskeletal: as above  Neurologic: no numbness, paresthesias  Remainder of review of systems is negative including constitutional, CV, pulmonary, GI, except as noted in HPI or medical history.    Patient's current problem list, past medical and surgical history, and family history were reviewed.    Patient Active Problem List   Diagnosis     Acquired hypothyroidism     Benign essential hypertension, BP <140/90     Knee pain      "Hepatic cyst     Hypertriglyceridemia, LDL goal <100     Osteopenia     DJD (degenerative joint disease)     Gastritis     Advanced directives, counseling/discussion     Seborrheic dermatitis     Type 2 diabetes mellitus with hyperglycemia, without long-term current use of insulin (H)     Past Medical History:   Diagnosis Date     Basal cell carcinoma      Hepatic cyst      Hypertriglyceridemia     mild      Osteopenia     dexa 11/2008     Past Surgical History:   Procedure Laterality Date     APPENDECTOMY  age 18     CHOLECYSTECTOMY, OPEN       HYSTERECTOMY, VAGINAL       No family history on file.      Objective  BP (!) 161/75   Ht 1.664 m (5' 5.5\")   Wt 94.8 kg (209 lb)   BMI 34.25 kg/m       GENERAL APPEARANCE: healthy, alert and no distress   GAIT: antalgic and wheelchair  SKIN: no suspicious lesions or rashes  HEENT: Sclera clear, anicteric  CV: good peripheral pulses  RESP: Breathing not labored  NEURO: Normal strength and tone, mentation intact and speech normal  PSYCH:  mentation appears normal and affect normal/bright    Bilateral Knee exam  Inspection:      mild effusion right    Patella:      Crepitus noted in the patellofemoral joint bilateral    Tender:      medial patellar border right       medial joint line right    Non Tender:      remainder of knee area bilateral    Knee ROM:      Range of motion limited by pain and swelling right > left    Strength:      5-/5 bilaterally    Special Tests:     *difficulty with special testing due to positioning    Gait:      antalgic gait    Neurovascular:      2+ peripheral pulses bilaterally and brisk capillary refill       sensation grossly intact    Radiology  I ordered, visualized and reviewed these images with the patient  Xr Knee Bilateral 3 Vw  Result Date: 8/2/2019  KNEE BILATERAL THREE VIEWS  8/2/2019 1:25 PM HISTORY: Bilateral chronic knee pain.   IMPRESSION: Bilateral osteoarthritis with right knee severe lateral compartment and left knee severe " medial compartment joint space narrowing. Right knee moderate medial compartment joint space narrowing is also noted. There are small joint effusions, right greater than left.    Large Joint Injection/Arthocentesis: R knee joint  Date/Time: 1/7/2020 2:00 PM  Performed by: Juan J Caballero DO  Authorized by: Juan J Caballero DO     Indications:  Pain  Needle Size:  21 G  Guidance: ultrasound    Approach:  Superolateral  Location:  Knee      Medications:  40 mg triamcinolone 40 MG/ML; 3 mL ropivacaine 5 MG/ML  Aspirate amount (mL):  15  Aspirate:  Serous and yellow  Outcome:  Tolerated well, no immediate complications  Procedure discussed: discussed risks, benefits, and alternatives    Consent Given by:  Patient  Timeout: timeout called immediately prior to procedure    Prep: patient was prepped and draped in usual sterile fashion        Assessment:  1. Arthritis of both knees    2. Chronic pain of both knees          Plan:  Follow up prn based on pain  Repeat US guided right knee injection with aspiration today  Defer on the left as pain is very mild only, and infrequent  Consider visco in the future prn  Encouraged maximizing low impact activity, safe WB  Reviewed options for potential steroid vs viscosupplementation injections and the possibility for future orthopedic referral prn  Reviewed safe and appropriate OTC medication choices, try tylenol first  Up to 3000mg daily of tylenol is generally safe, NSAID dosing and duration limitations reviewed  Discussed nature of degenerative arthrosis of the knee.   Discussed symptom treatment with ice or heat, topical treatments, and rest if needed.   Expectations and goals of CSI reviewed  Often 2-3 days for steroid effect, and can take up to two weeks for maximum effect  We discussed modified progressive pain-free activity as tolerated  Do not overuse in first two weeks if feeling better due to concern for vulnerability while steroid is working  Supportive  care reviewed  All questions were answered today  Contact us with additional questions or concerns  Signs and sx of concern reviewed      Juan J Caballero DO, CAQ  Primary Care Sports Medicine  [facility]           Again, thank you for allowing me to participate in the care of your patient.        Sincerely,        Juan J Caballero DO

## 2020-01-07 NOTE — PROGRESS NOTES
Adela Means  :  3/10/1927  DOS: 2020  MRN: [id number]    Sports Medicine Clinic Visit    PCP: Yoselyn Castrejon    Adela Means is a 89 year old female who is seen  as an ER referral presenting with bilteral knee pain    Injury: She reports chronic knee pain for 6 months. She has pain intermittently with walking. She reports swelling and pain in the anterior knee, right currently worse than left.    Location of Pain: bilateral knee pain  Duration of Pain: 6 month(s)  Rating of Pain at worst: 9/10  Rating of Pain Currently: 6/10  Symptoms are better with: Rest  Symptoms are worse with: walking  Additional Features:   Positive: swelling and weakness   Negative: bruising, popping, grinding, catching, locking, instability, paresthesias and numbness  Other evaluation and/or treatments so far consists of: Nothing  Prior History of related problems: nothing    Social History: retired    Interim History - 2020  Since last visit on 2019 patient has moderate bilateral knee pain and swelling.  Bilateral knee steriod injection completed on  provided good relief for ~ 4 months.  Patient desires repeat injection today.  No new injury in the interim.    Review of Systems  Skin: no bruising, yes swelling  Musculoskeletal: as above  Neurologic: no numbness, paresthesias  Remainder of review of systems is negative including constitutional, CV, pulmonary, GI, except as noted in HPI or medical history.    Patient's current problem list, past medical and surgical history, and family history were reviewed.    Patient Active Problem List   Diagnosis     Acquired hypothyroidism     Benign essential hypertension, BP <140/90     Knee pain     Hepatic cyst     Hypertriglyceridemia, LDL goal <100     Osteopenia     DJD (degenerative joint disease)     Gastritis     Advanced directives, counseling/discussion     Seborrheic dermatitis     Type 2 diabetes mellitus with hyperglycemia, without long-term  "current use of insulin (H)     Past Medical History:   Diagnosis Date     Basal cell carcinoma      Hepatic cyst      Hypertriglyceridemia     mild      Osteopenia     dexa 11/2008     Past Surgical History:   Procedure Laterality Date     APPENDECTOMY  age 18     CHOLECYSTECTOMY, OPEN       HYSTERECTOMY, VAGINAL       No family history on file.      Objective  BP (!) 161/75   Ht 1.664 m (5' 5.5\")   Wt 94.8 kg (209 lb)   BMI 34.25 kg/m      GENERAL APPEARANCE: healthy, alert and no distress   GAIT: antalgic and wheelchair  SKIN: no suspicious lesions or rashes  HEENT: Sclera clear, anicteric  CV: good peripheral pulses  RESP: Breathing not labored  NEURO: Normal strength and tone, mentation intact and speech normal  PSYCH:  mentation appears normal and affect normal/bright    Bilateral Knee exam  Inspection:      mild effusion right    Patella:      Crepitus noted in the patellofemoral joint bilateral    Tender:      medial patellar border right       medial joint line right    Non Tender:      remainder of knee area bilateral    Knee ROM:      Range of motion limited by pain and swelling right > left    Strength:      5-/5 bilaterally    Special Tests:     *difficulty with special testing due to positioning    Gait:      antalgic gait    Neurovascular:      2+ peripheral pulses bilaterally and brisk capillary refill       sensation grossly intact    Radiology  I ordered, visualized and reviewed these images with the patient  Xr Knee Bilateral 3 Vw  Result Date: 8/2/2019  KNEE BILATERAL THREE VIEWS  8/2/2019 1:25 PM HISTORY: Bilateral chronic knee pain.   IMPRESSION: Bilateral osteoarthritis with right knee severe lateral compartment and left knee severe medial compartment joint space narrowing. Right knee moderate medial compartment joint space narrowing is also noted. There are small joint effusions, right greater than left.    Large Joint Injection/Arthocentesis: R knee joint  Date/Time: 1/7/2020 2:00 " PM  Performed by: Juan J Caballero DO  Authorized by: Juan J Caballero DO     Indications:  Pain  Needle Size:  21 G  Guidance: ultrasound    Approach:  Superolateral  Location:  Knee      Medications:  40 mg triamcinolone 40 MG/ML; 3 mL ropivacaine 5 MG/ML  Aspirate amount (mL):  15  Aspirate:  Serous and yellow  Outcome:  Tolerated well, no immediate complications  Procedure discussed: discussed risks, benefits, and alternatives    Consent Given by:  Patient  Timeout: timeout called immediately prior to procedure    Prep: patient was prepped and draped in usual sterile fashion        Assessment:  1. Arthritis of both knees    2. Chronic pain of both knees          Plan:  Follow up prn based on pain  Repeat US guided right knee injection with aspiration today  Defer on the left as pain is very mild only, and infrequent  Consider visco in the future prn  Encouraged maximizing low impact activity, safe WB  Reviewed options for potential steroid vs viscosupplementation injections and the possibility for future orthopedic referral prn  Reviewed safe and appropriate OTC medication choices, try tylenol first  Up to 3000mg daily of tylenol is generally safe, NSAID dosing and duration limitations reviewed  Discussed nature of degenerative arthrosis of the knee.   Discussed symptom treatment with ice or heat, topical treatments, and rest if needed.   Expectations and goals of CSI reviewed  Often 2-3 days for steroid effect, and can take up to two weeks for maximum effect  We discussed modified progressive pain-free activity as tolerated  Do not overuse in first two weeks if feeling better due to concern for vulnerability while steroid is working  Supportive care reviewed  All questions were answered today  Contact us with additional questions or concerns  Signs and sx of concern reviewed      Juan J Caballero DO, CAQ  Primary Care Sports Medicine  [facility]

## 2020-02-19 ENCOUNTER — TELEPHONE (OUTPATIENT)
Dept: FAMILY MEDICINE | Facility: CLINIC | Age: 85
End: 2020-02-19

## 2020-02-19 DIAGNOSIS — E11.9 TYPE 2 DIABETES MELLITUS WITHOUT COMPLICATION, WITHOUT LONG-TERM CURRENT USE OF INSULIN (H): Chronic | ICD-10-CM

## 2020-02-19 RX ORDER — GLIPIZIDE 5 MG/1
TABLET, FILM COATED, EXTENDED RELEASE ORAL
Qty: 270 TABLET | Refills: 1 | Status: SHIPPED | OUTPATIENT
Start: 2020-02-19 | End: 2020-09-29

## 2020-02-19 NOTE — TELEPHONE ENCOUNTER
Refilled Glipizide.  EVELIA Buckley    Lab Results   Component Value Date    A1C 9.1 01/03/2020    A1C 8.5 08/26/2019    A1C 12.5 04/09/2019    A1C 7.3 02/25/2017       Yoselyn,  Patient requesting 90 day supply of 270 tablets, was told to be seen in 3 months around  March per result note on 1-3-2020, ok to expedite this?    GRABIEL López

## 2020-02-19 NOTE — TELEPHONE ENCOUNTER
"Reason for Call:  Other prescription    Detailed comments: [facility] sent fax for    glipiZIDE (GLUCOTROL XL) 5 MG 24 hr tablet 90 tablet 2 1/3/2020  No   Sig: Take 3 tablets (15 mg) by mouth once daily with breakfast     stating \"Patient requests new rx for #270 for 90 day supply for cheaper co-pay\"  Please send new rx if approved.    Call taken on 2/19/2020 at 11:03 AM by Thelma Arteaga    "

## 2020-02-28 ENCOUNTER — ALLIED HEALTH/NURSE VISIT (OUTPATIENT)
Dept: EDUCATION SERVICES | Facility: CLINIC | Age: 85
End: 2020-02-28
Payer: COMMERCIAL

## 2020-02-28 ENCOUNTER — OFFICE VISIT (OUTPATIENT)
Dept: FAMILY MEDICINE | Facility: CLINIC | Age: 85
End: 2020-02-28
Payer: COMMERCIAL

## 2020-02-28 VITALS
WEIGHT: 208 LBS | DIASTOLIC BLOOD PRESSURE: 74 MMHG | SYSTOLIC BLOOD PRESSURE: 132 MMHG | HEIGHT: 66 IN | RESPIRATION RATE: 20 BRPM | OXYGEN SATURATION: 93 % | TEMPERATURE: 98.4 F | HEART RATE: 68 BPM | BODY MASS INDEX: 33.43 KG/M2

## 2020-02-28 DIAGNOSIS — R41.0 CONFUSION: ICD-10-CM

## 2020-02-28 DIAGNOSIS — E11.65 TYPE 2 DIABETES MELLITUS WITH HYPERGLYCEMIA, WITHOUT LONG-TERM CURRENT USE OF INSULIN (H): Primary | ICD-10-CM

## 2020-02-28 DIAGNOSIS — E78.1 HYPERTRIGLYCERIDEMIA: Chronic | ICD-10-CM

## 2020-02-28 DIAGNOSIS — R73.9 ELEVATED RANDOM BLOOD GLUCOSE LEVEL: ICD-10-CM

## 2020-02-28 DIAGNOSIS — E03.9 ACQUIRED HYPOTHYROIDISM: Chronic | ICD-10-CM

## 2020-02-28 DIAGNOSIS — R25.1 SHAKY: ICD-10-CM

## 2020-02-28 DIAGNOSIS — F03.90 DEMENTIA WITHOUT BEHAVIORAL DISTURBANCE, UNSPECIFIED DEMENTIA TYPE: ICD-10-CM

## 2020-02-28 DIAGNOSIS — I10 BENIGN ESSENTIAL HYPERTENSION: Chronic | ICD-10-CM

## 2020-02-28 LAB
ALBUMIN SERPL-MCNC: 3.3 G/DL (ref 3.4–5)
ALBUMIN UR-MCNC: NEGATIVE MG/DL
ALP SERPL-CCNC: 80 U/L (ref 40–150)
ALT SERPL W P-5'-P-CCNC: 38 U/L (ref 0–50)
ANION GAP SERPL CALCULATED.3IONS-SCNC: 6 MMOL/L (ref 3–14)
APPEARANCE UR: CLEAR
AST SERPL W P-5'-P-CCNC: 24 U/L (ref 0–45)
BILIRUB SERPL-MCNC: 0.4 MG/DL (ref 0.2–1.3)
BILIRUB UR QL STRIP: NEGATIVE
BUN SERPL-MCNC: 21 MG/DL (ref 7–30)
CALCIUM SERPL-MCNC: 8.7 MG/DL (ref 8.5–10.1)
CHLORIDE SERPL-SCNC: 99 MMOL/L (ref 94–109)
CO2 SERPL-SCNC: 26 MMOL/L (ref 20–32)
COLOR UR AUTO: YELLOW
CREAT SERPL-MCNC: 0.63 MG/DL (ref 0.52–1.04)
ERYTHROCYTE [DISTWIDTH] IN BLOOD BY AUTOMATED COUNT: 12.8 % (ref 10–15)
GFR SERPL CREATININE-BSD FRML MDRD: 77 ML/MIN/{1.73_M2}
GLUCOSE SERPL-MCNC: 276 MG/DL (ref 70–99)
GLUCOSE UR STRIP-MCNC: >=1000 MG/DL
HCT VFR BLD AUTO: 42.2 % (ref 35–47)
HGB BLD-MCNC: 14.6 G/DL (ref 11.7–15.7)
HGB UR QL STRIP: NEGATIVE
KETONES UR STRIP-MCNC: NEGATIVE MG/DL
LEUKOCYTE ESTERASE UR QL STRIP: NEGATIVE
MCH RBC QN AUTO: 28.2 PG (ref 26.5–33)
MCHC RBC AUTO-ENTMCNC: 34.6 G/DL (ref 31.5–36.5)
MCV RBC AUTO: 82 FL (ref 78–100)
NITRATE UR QL: NEGATIVE
PH UR STRIP: 6 PH (ref 5–7)
PLATELET # BLD AUTO: 186 10E9/L (ref 150–450)
POTASSIUM SERPL-SCNC: 4.6 MMOL/L (ref 3.4–5.3)
PROT SERPL-MCNC: 7.4 G/DL (ref 6.8–8.8)
RBC # BLD AUTO: 5.18 10E12/L (ref 3.8–5.2)
SODIUM SERPL-SCNC: 131 MMOL/L (ref 133–144)
SOURCE: ABNORMAL
SP GR UR STRIP: 1.01 (ref 1–1.03)
T4 FREE SERPL-MCNC: 1.5 NG/DL (ref 0.76–1.46)
TSH SERPL DL<=0.005 MIU/L-ACNC: 7.41 MU/L (ref 0.4–4)
UROBILINOGEN UR STRIP-ACNC: 0.2 EU/DL (ref 0.2–1)
WBC # BLD AUTO: 8.3 10E9/L (ref 4–11)

## 2020-02-28 PROCEDURE — 85027 COMPLETE CBC AUTOMATED: CPT | Performed by: NURSE PRACTITIONER

## 2020-02-28 PROCEDURE — 80053 COMPREHEN METABOLIC PANEL: CPT | Performed by: NURSE PRACTITIONER

## 2020-02-28 PROCEDURE — 84439 ASSAY OF FREE THYROXINE: CPT | Performed by: NURSE PRACTITIONER

## 2020-02-28 PROCEDURE — 81003 URINALYSIS AUTO W/O SCOPE: CPT | Performed by: NURSE PRACTITIONER

## 2020-02-28 PROCEDURE — 36415 COLL VENOUS BLD VENIPUNCTURE: CPT | Performed by: NURSE PRACTITIONER

## 2020-02-28 PROCEDURE — 99214 OFFICE O/P EST MOD 30 MIN: CPT | Performed by: NURSE PRACTITIONER

## 2020-02-28 PROCEDURE — 84443 ASSAY THYROID STIM HORMONE: CPT | Performed by: NURSE PRACTITIONER

## 2020-02-28 PROCEDURE — G0108 DIAB MANAGE TRN  PER INDIV: HCPCS | Performed by: DIETITIAN, REGISTERED

## 2020-02-28 ASSESSMENT — MIFFLIN-ST. JEOR: SCORE: 1362.29

## 2020-02-28 NOTE — PATIENT INSTRUCTIONS
1. Type 2 diabetes mellitus with hyperglycemia, without long-term current use of insulin (H)  Chronic, uncontrolled  Will start Lantus injection daily    2. Benign essential hypertension, BP <140/90  Chronic, stable  - Comprehensive metabolic panel    3. Hypertriglyceridemia, LDL goal <100  Chronic, stable    4. Acquired hypothyroidism  Chronic, stable  - TSH with free T4 reflex    5. Elevated random blood glucose level  Acute d/t uncontrolled Diabetes  - CBC with platelets  - Comprehensive metabolic panel    6. Confusion  Acute d/t uncontrolled Diabetes  - UA reflex to Microscopic and Culture  - CBC with platelets  - Comprehensive metabolic panel    7. Shaky  Acute d/t uncontrolled Diabetes  - UA reflex to Microscopic and Culture  - CBC with platelets  - Comprehensive metabolic panel    8. Dementia without behavioral disturbance, unspecified dementia type (H)  Chronic, worsened  SLUMS dementia score: 10  Recommendation for Neuropsychiatry evaluation and possible medications  Discuss with family, if this is how you would like to proceed, I'll make referral    Results for orders placed or performed in visit on 02/28/20   UA reflex to Microscopic and Culture     Status: Abnormal   Result Value Ref Range    Color Urine Yellow     Appearance Urine Clear     Glucose Urine >=1000 (A) NEG^Negative mg/dL    Bilirubin Urine Negative NEG^Negative    Ketones Urine Negative NEG^Negative mg/dL    Specific Gravity Urine 1.015 1.003 - 1.035    Blood Urine Negative NEG^Negative    pH Urine 6.0 5.0 - 7.0 pH    Protein Albumin Urine Negative NEG^Negative mg/dL    Urobilinogen Urine 0.2 0.2 - 1.0 EU/dL    Nitrite Urine Negative NEG^Negative    Leukocyte Esterase Urine Negative NEG^Negative    Source Midstream Urine    CBC with platelets     Status: None   Result Value Ref Range    WBC 8.3 4.0 - 11.0 10e9/L    RBC Count 5.18 3.8 - 5.2 10e12/L    Hemoglobin 14.6 11.7 - 15.7 g/dL    Hematocrit 42.2 35.0 - 47.0 %    MCV 82 78 - 100 fl     MCH 28.2 26.5 - 33.0 pg    MCHC 34.6 31.5 - 36.5 g/dL    RDW 12.8 10.0 - 15.0 %    Platelet Count 186 150 - 450 10e9/L

## 2020-02-28 NOTE — NURSING NOTE
"Chief Complaint   Patient presents with     Hypertension     Diabetes     Thyroid Problem     Confusion       Initial /74   Pulse 68   Temp 98.4  F (36.9  C) (Tympanic)   Resp 20   Ht 1.664 m (5' 5.5\")   Wt 94.3 kg (208 lb)   SpO2 93%   BMI 34.09 kg/m   Estimated body mass index is 34.09 kg/m  as calculated from the following:    Height as of this encounter: 1.664 m (5' 5.5\").    Weight as of this encounter: 94.3 kg (208 lb).    Patient presents to the clinic using No DME    Health Maintenance that is potentially due pending provider review:  NONE    n/a    Is there anyone who you would like to be able to receive your results? No  If yes have patient fill out MIREYA    "

## 2020-02-28 NOTE — PROGRESS NOTES
SUBJECTIVE   Adela Means is a  female who presents to clinic today accompanied by her daughter for the following health issue(s):       Diabetes Follow-up  How often are you checking your blood sugar? One time daily  What time of day are you checking your blood sugars (select all that apply)?  9:00 AM  Have you had any blood sugars above 200?  Yes 300's for 1 week   Have you had any blood sugars below 70?  No    What symptoms do you notice when your blood sugar is low?  None    What concerns do you have today about your diabetes? None     Do you have any of these symptoms? (Select all that apply)  No numbness or tingling in feet.  No redness, sores or blisters on feet.  No complaints of excessive thirst.  No reports of blurry vision.  No significant changes to weight.    Have you had a diabetic eye exam in the last 12 months? No    Hypertension Follow-up    Do you check your blood pressure regularly outside of the clinic? No     Are you following a low salt diet? No    Are your blood pressures ever more than 140 on the top number (systolic) OR more   than 90 on the bottom number (diastolic), for example 140/90? No    Hypothyroidism Follow-up    Since last visit, patient describes the following symptoms: Weight stable, no hair loss, no skin changes, no constipation, no loose stools  TSH   Date Value Ref Range Status   08/26/2019 3.17 0.40 - 4.00 mU/L Final   04/09/2019 8.03 (H) 0.40 - 4.00 mU/L Final   03/13/2018 3.36 0.40 - 4.00 mU/L Final   02/24/2017 3.34 0.40 - 4.00 mU/L Final   01/27/2016 3.43 0.40 - 4.00 mU/L Final       BP Readings from Last 2 Encounters:   02/28/20 132/74   01/07/20 (!) 161/75     Hemoglobin A1C (%)   Date Value   01/03/2020 9.1 (H)   08/26/2019 8.5 (H)     LDL Cholesterol Calculated (mg/dL)   Date Value   04/09/2019 89   03/13/2018 73         How many servings of fruits and vegetables do you eat daily?  2-3    On average, how many sweetened beverages do you drink each day (Examples:  "soda, juice, sweet tea, etc.  Do NOT count diet or artificially sweetened beverages)?   0    History of increasing confusion  SLUMS Score: 10   Daughter will discuss with family about neuropsych evaluation for medication    Concern - Shaky, confusion, feels \"achy\"  Onset: 1 week     Description:   Increased shakiness, fatigue, and confusion     Intensity: Varies     Progression of Symptoms:  worsening    Accompanying Signs & Symptoms:  Digestive issues drinks prune juice daily    Previous history of similar problem:   yes    Precipitating factors:   Worsened by: NA    Alleviating factors:  Improved by: NA  Therapies Tried and outcome: none      Health Maintenance        Health Maintenance Due   Topic Date Due     EYE EXAM  03/10/1927     ADVANCE CARE PLANNING  02/21/2017     DTAP/TDAP/TD IMMUNIZATION (3 - Td) 10/01/2018     PHQ-2  01/01/2020       PCP   Yoselyn Castrejon, [facility] [phone]    PROBLEM LIST        Patient Active Problem List   Diagnosis     Acquired hypothyroidism     Benign essential hypertension, BP <140/90     Knee pain     Hepatic cyst     Hypertriglyceridemia, LDL goal <100     Osteopenia     DJD (degenerative joint disease)     Gastritis     Advanced directives, counseling/discussion     Seborrheic dermatitis     Type 2 diabetes mellitus with hyperglycemia, without long-term current use of insulin (H)       MEDICATIONS        Current Outpatient Medications   Medication     ACE/ARB NOT PRESCRIBED, INTENTIONAL,     acetaminophen (TYLENOL) 500 MG tablet     aspirin (ASA) 81 MG tablet     blood glucose (CONTOUR NEXT TEST) test strip     blood glucose monitoring (PEREZ MICROLET) lancets     calcium carbonate (OS-KATHY 600 MG [place]. [place]) 600 MG tablet     furosemide (LASIX) 20 MG tablet     glipiZIDE 5 MG PO 24 hr tablet     ketoconazole (NIZORAL) 2 % external shampoo     levothyroxine (SYNTHROID/LEVOTHROID) 112 MCG tablet     omega-3 acid ethyl esters (LOVAZA) 1 g capsule     vitamin D3 (VITAMIN D3) " "1000 units (25 mcg) tablet     Current Facility-Administered Medications   Medication     ropivacaine (NAROPIN) injection 3 mL     triamcinolone (KENALOG-40) injection 40 mg       Reviewed and updated as needed this visit by Provider:  Tobacco  Allergies  Meds  Med Hx  Surg Hx  Fam Hx  Soc Hx     ROS      Constitutional, HEENT, cardiovascular, pulmonary, gi and gu systems are negative, except as otherwise noted.    PHYSICAL EXAM   /74   Pulse 68   Temp 98.4  F (36.9  C) (Tympanic)   Resp 20   Ht 1.664 m (5' 5.5\")   Wt 94.3 kg (208 lb)   SpO2 93%   BMI 34.09 kg/m    Body mass index is 34.09 kg/m .  GENERAL APPEARANCE: healthy, alert and no distress  RESP: lungs clear to auscultation - no rales, rhonchi or wheezes  CV: regular rates and rhythm, normal S1 S2, no S3 or S4 and no murmur, click or rub  MS: extremities normal- no gross deformities noted  PSYCH: mentation appears abnormal- poor memory, confusion, and hearing poor    Results for orders placed or performed in visit on 02/28/20   UA reflex to Microscopic and Culture     Status: Abnormal   Result Value Ref Range    Color Urine Yellow     Appearance Urine Clear     Glucose Urine >=1000 (A) NEG^Negative mg/dL    Bilirubin Urine Negative NEG^Negative    Ketones Urine Negative NEG^Negative mg/dL    Specific Gravity Urine 1.015 1.003 - 1.035    Blood Urine Negative NEG^Negative    pH Urine 6.0 5.0 - 7.0 pH    Protein Albumin Urine Negative NEG^Negative mg/dL    Urobilinogen Urine 0.2 0.2 - 1.0 EU/dL    Nitrite Urine Negative NEG^Negative    Leukocyte Esterase Urine Negative NEG^Negative    Source Midstream Urine    CBC with platelets     Status: None   Result Value Ref Range    WBC 8.3 4.0 - 11.0 10e9/L    RBC Count 5.18 3.8 - 5.2 10e12/L    Hemoglobin 14.6 11.7 - 15.7 g/dL    Hematocrit 42.2 35.0 - 47.0 %    MCV 82 78 - 100 fl    MCH 28.2 26.5 - 33.0 pg    MCHC 34.6 31.5 - 36.5 g/dL    RDW 12.8 10.0 - 15.0 %    Platelet Count 186 150 - 450 10e9/L "     Alisha Ruiz, diabetic educator in with patient today to review diet, and starting on Lantus- daughter is able to do this daily.  Ozempic is not covered (once weekly injection)    ASSESSMENT & PLAN     1. Type 2 diabetes mellitus with hyperglycemia, without long-term current use of insulin (H)  Chronic, uncontrolled  Will start Lantus 10 unites subcutaneous injection daily  Will f/u with Alisha Ruiz     2. Benign essential hypertension, BP <140/90  Chronic, stable  - Comprehensive metabolic panel    3. Hypertriglyceridemia, LDL goal <100  Chronic, stable    4. Acquired hypothyroidism  Chronic, stable  - TSH with free T4 reflex    5. Elevated random blood glucose level  Acute d/t uncontrolled Diabetes  - CBC with platelets  - Comprehensive metabolic panel    6. Confusion  Acute d/t uncontrolled Diabetes  - UA reflex to Microscopic and Culture  - CBC with platelets  - Comprehensive metabolic panel    7. Shaky  Acute d/t uncontrolled Diabetes  - UA reflex to Microscopic and Culture  - CBC with platelets  - Comprehensive metabolic panel    8. Dementia without behavioral disturbance, unspecified dementia type (H)  Chronic, worsened  SLUMS dementia score: 10  Recommendation for Neuropsychiatry evaluation and possible medications  Discuss with family, if this is how you would like to proceed, I'll make referral    Results for orders placed or performed in visit on 02/28/20   UA reflex to Microscopic and Culture     Status: Abnormal   Result Value Ref Range    Color Urine Yellow     Appearance Urine Clear     Glucose Urine >=1000 (A) NEG^Negative mg/dL    Bilirubin Urine Negative NEG^Negative    Ketones Urine Negative NEG^Negative mg/dL    Specific Gravity Urine 1.015 1.003 - 1.035    Blood Urine Negative NEG^Negative    pH Urine 6.0 5.0 - 7.0 pH    Protein Albumin Urine Negative NEG^Negative mg/dL    Urobilinogen Urine 0.2 0.2 - 1.0 EU/dL    Nitrite Urine Negative NEG^Negative    Leukocyte Esterase Urine Negative  NEG^Negative    Source Midstream Urine    CBC with platelets     Status: None   Result Value Ref Range    WBC 8.3 4.0 - 11.0 10e9/L    RBC Count 5.18 3.8 - 5.2 10e12/L    Hemoglobin 14.6 11.7 - 15.7 g/dL    Hematocrit 42.2 35.0 - 47.0 %    MCV 82 78 - 100 fl    MCH 28.2 26.5 - 33.0 pg    MCHC 34.6 31.5 - 36.5 g/dL    RDW 12.8 10.0 - 15.0 %    Platelet Count 186 150 - 450 10e9/L         Risks, benefits, side effects and rationale for treatment plan fully discussed with the patient and understanding expressed.  EVELIA [facility]

## 2020-02-28 NOTE — RESULT ENCOUNTER NOTE
TSH (this is elevated again). Repeat TSH in 3 months  CMP- stable with elevated glucose in known diabetic, and reduced sodium (due to large quantities of fluids) Recommend fluid restriction to no more than 1 Liter/day    Please call her with these results. Send a hard copy for their records.   Thanks. EVELIA Buckley

## 2020-02-28 NOTE — PATIENT INSTRUCTIONS
"1.  Start taking Lantus insulin 10 units at 4 pm, increase by 2 units every 4 days until am readings are under 150.    Taking Insulin:    1. Take Lantus - 10 units at 4 pm (increase by 2 units every 4 days until am readings are under 150).     - Do a 2 unit \"prime\" before each injection, be sure a stream of insulin comes out of the needle before you give your injection.    - After you inject, hold the needle under the skin to the count of 10 to be sure all of the insulin goes in.     - Rotate injection sites, keeping at least 1 inch apart from last injection site and 2 inches away from belly button or surgical scars.    2. Pen - Use a new pen needle for each injection. Always remove pen needle from the insulin pen after use and do not store insulin pens with the needle on the pen.     3. Store insulin you are not using in the refrigerator (do not freeze). Take new insulin out of the refrigerator a few hours prior to use to bring to room temperature.     4. Once opened Lantus can be kept at room temperature for 28 days after opened.. Do not use the opened insulin after this time has passed, even if there is still medicine inside.     5. Always carry your blood sugar meter and a sugar source like glucose tablets with you in case of a low blood sugar. Treat a low blood sugar (less than 70) with 15 grams of carbohydrate (1 carb choice). Wait 15 minutes, recheck blood sugar. If blood sugar is still below 70, repeat the treatment.    6. Wear Medical ID or carry a wallet card stating you have Diabetes.    7. Call your doctor or diabetes educator if you begin having low blood sugars or if you have questions or concerns.     8.  Call me in one week with blood sugars levels.    Alisha [phone].  "

## 2020-02-28 NOTE — PROGRESS NOTES
"Diabetes Self-Management Education & Support    Presents for: Individual review    SUBJECTIVE/OBJECTIVE:  Presents for: Individual review  Accompanied by: Self, Daughter  Diabetes education in the past 24mo: No  Focus of Visit: Healthy Eating, Insulin Start  Diabetes type: Type 2  Disease course: Worsening  Cultural Influences/Ethnic Background:  American      Diabetes Symptoms & Complications:          Patient Problem List and Family Medical History reviewed for relevant medical history, current medical status, and diabetes risk factors.    Vitals:  There were no vitals taken for this visit.  Estimated body mass index is 34.09 kg/m  as calculated from the following:    Height as of an earlier encounter on 2/28/20: 1.664 m (5' 5.5\").    Weight as of an earlier encounter on 2/28/20: 94.3 kg (208 lb).   Last 3 BP:   BP Readings from Last 3 Encounters:   02/28/20 132/74   01/07/20 (!) 161/75   09/18/19 124/80       History   Smoking Status     Never Smoker   Smokeless Tobacco     Never Used       Labs:  Lab Results   Component Value Date    A1C 9.1 01/03/2020     Lab Results   Component Value Date     02/28/2020     Lab Results   Component Value Date    LDL 89 04/09/2019     HDL Cholesterol   Date Value Ref Range Status   04/09/2019 46 (L) >49 mg/dL Final   ]  GFR Estimate   Date Value Ref Range Status   02/28/2020 77 >60 mL/min/[1.73_m2] Final     Comment:     Non  GFR Calc  Starting 12/18/2018, serum creatinine based estimated GFR (eGFR) will be   calculated using the Chronic Kidney Disease Epidemiology Collaboration   (CKD-EPI) equation.       GFR Estimate If Black   Date Value Ref Range Status   02/28/2020 89 >60 mL/min/[1.73_m2] Final     Comment:      GFR Calc  Starting 12/18/2018, serum creatinine based estimated GFR (eGFR) will be   calculated using the Chronic Kidney Disease Epidemiology Collaboration   (CKD-EPI) equation.       Lab Results   Component Value Date    CR " 0.63 02/28/2020     No results found for: MICROALBUMIN    Healthy Eating:  Breakfast: cereal, milk  L[facility]: [facility], eats fruit, milk (doesnt eat much)  Dinner: meals on wheels  Snacks: drinking about 4 gallons a of milk a week  Beverages: Water, Coffee, Milk, Juice    Being Active:  Exercise:: Currently not exercising    Monitoring:     BG running in 300's    Taking Medications:  Diabetes Medication(s)     Insulin       insulin glargine (LANTUS SOLOSTAR) 100 UNIT/ML pen    Take 10 units at 4 pm daily (increase by 2 units every 4 days until am blood sugars are under 150)    Sulfonylureas       glipiZIDE 5 MG PO 24 hr tablet    Take 3 tablets (15 mg) by mouth once daily with breakfast.               Problem Solving:   not assessed              Reducing Risks:       Healthy Coping:     Patient Activation Measure Survey Score:  VIVEK Score (Last Two) 3/30/2011   VIVEK Raw Score 52   Activation Score 100   VIVEK Level 4       Diabetes knowledge and skills assessment:   Patient is knowledgeable in diabetes management concepts related to: Healthy Eating and Monitoring    Patient needs further education on the following diabetes management concepts: Healthy Eating, Being Active, Monitoring, Taking Medication, Problem Solving, Reducing Risks and Healthy Coping    Based on learning assessment above, most appropriate setting for further diabetes education would be: Individual setting.      INTERVENTIONS:    Education provided today on:  AADE Self-Care Behaviors:  Insulin administration technique taught today. Patient/daughter verbalized understanding and was able to perform an accurate return demonstration of administration technique.    Opportunities for ongoing education and support in diabetes-self management were discussed.    Pt verbalized understanding of concepts discussed and recommendations provided today.       Education Materials Provided:  lantus Insulin information      ASSESSMENT:  BG high needs insulin. Started  on low dose, with titration according to BG range.  Encouraged decrease in milk intakes.  Taught injection technique to daughter [place] who will give injection daily.    Patient's most recent   Lab Results   Component Value Date    A1C 9.1 01/03/2020    is not meeting goal of <8.0    PLAN  See Patient Instructions for co-developed, patient-stated behavior change goals.  AVS printed and provided to patient today. See Follow-Up section for recommended follow-up.      Time Spent: 30 minutes  Encounter Type: Individual    Any diabetes medication dose changes were made via the CDE Protocol and Collaborative Practice Agreement with the patient's primary care provider. A copy of this encounter was shared with the provider.

## 2020-04-25 DIAGNOSIS — E03.9 ACQUIRED HYPOTHYROIDISM: Chronic | ICD-10-CM

## 2020-04-26 RX ORDER — LEVOTHYROXINE SODIUM 112 UG/1
TABLET ORAL
Qty: 90 TABLET | Refills: 0 | Status: ON HOLD | OUTPATIENT
Start: 2020-04-26 | End: 2020-05-26

## 2020-04-28 DIAGNOSIS — R60.9 EDEMA, UNSPECIFIED TYPE: ICD-10-CM

## 2020-04-28 RX ORDER — FUROSEMIDE 20 MG
TABLET ORAL
Qty: 90 TABLET | Refills: 0 | Status: SHIPPED | OUTPATIENT
Start: 2020-04-28 | End: 2020-07-30

## 2020-05-16 DIAGNOSIS — M85.80 OSTEOPENIA: ICD-10-CM

## 2020-05-18 RX ORDER — MULTIVIT-MIN/IRON/FOLIC ACID/K 18-600-40
CAPSULE ORAL
Qty: 90 TABLET | Refills: 0 | Status: SHIPPED | OUTPATIENT
Start: 2020-05-18 | End: 2020-08-21

## 2020-05-25 ENCOUNTER — HOSPITAL ENCOUNTER (INPATIENT)
Facility: CLINIC | Age: 85
LOS: 1 days | Discharge: HOME | DRG: 291 | End: 2020-05-26
Attending: EMERGENCY MEDICINE | Admitting: FAMILY MEDICINE
Payer: COMMERCIAL

## 2020-05-25 ENCOUNTER — APPOINTMENT (OUTPATIENT)
Dept: GENERAL RADIOLOGY | Facility: CLINIC | Age: 85
DRG: 291 | End: 2020-05-25
Attending: EMERGENCY MEDICINE
Payer: COMMERCIAL

## 2020-05-25 DIAGNOSIS — R06.03 ACUTE RESPIRATORY DISTRESS: ICD-10-CM

## 2020-05-25 DIAGNOSIS — R06.00 DYSPNEA, UNSPECIFIED TYPE: ICD-10-CM

## 2020-05-25 DIAGNOSIS — Z86.39 HISTORY OF HYPOTHYROIDISM: ICD-10-CM

## 2020-05-25 DIAGNOSIS — I10 BENIGN ESSENTIAL HYPERTENSION: Primary | Chronic | ICD-10-CM

## 2020-05-25 DIAGNOSIS — Z03.818 ENCNTR FOR OBS FOR SUSP EXPSR TO OTH BIOLG AGENTS RULED OUT: ICD-10-CM

## 2020-05-25 DIAGNOSIS — E03.9 HYPOTHYROIDISM, UNSPECIFIED TYPE: ICD-10-CM

## 2020-05-25 DIAGNOSIS — I48.91 NEW ONSET A-FIB (H): ICD-10-CM

## 2020-05-25 PROBLEM — I50.9 HEART FAILURE (H): Status: ACTIVE | Noted: 2020-05-25

## 2020-05-25 PROBLEM — R06.02 SHORTNESS OF BREATH: Status: ACTIVE | Noted: 2020-05-25

## 2020-05-25 LAB
ALBUMIN SERPL-MCNC: 3.5 G/DL (ref 3.4–5)
ALBUMIN UR-MCNC: 30 MG/DL
ALP SERPL-CCNC: 67 U/L (ref 40–150)
ALT SERPL W P-5'-P-CCNC: 31 U/L (ref 0–50)
ANION GAP SERPL CALCULATED.3IONS-SCNC: 7 MMOL/L (ref 3–14)
APPEARANCE UR: ABNORMAL
AST SERPL W P-5'-P-CCNC: 23 U/L (ref 0–45)
BASE EXCESS BLDV CALC-SCNC: 6 MMOL/L
BASOPHILS # BLD AUTO: 0.1 10E9/L (ref 0–0.2)
BASOPHILS NFR BLD AUTO: 0.6 %
BILIRUB SERPL-MCNC: 0.7 MG/DL (ref 0.2–1.3)
BILIRUB UR QL STRIP: NEGATIVE
BUN SERPL-MCNC: 14 MG/DL (ref 7–30)
CALCIUM SERPL-MCNC: 8.9 MG/DL (ref 8.5–10.1)
CHLORIDE SERPL-SCNC: 96 MMOL/L (ref 94–109)
CO2 SERPL-SCNC: 28 MMOL/L (ref 20–32)
COLOR UR AUTO: YELLOW
CREAT SERPL-MCNC: 0.69 MG/DL (ref 0.52–1.04)
DIFFERENTIAL METHOD BLD: ABNORMAL
EOSINOPHIL # BLD AUTO: 0.1 10E9/L (ref 0–0.7)
EOSINOPHIL NFR BLD AUTO: 1.2 %
ERYTHROCYTE [DISTWIDTH] IN BLOOD BY AUTOMATED COUNT: 11.9 % (ref 10–15)
GFR SERPL CREATININE-BSD FRML MDRD: 75 ML/MIN/{1.73_M2}
GLUCOSE BLDC GLUCOMTR-MCNC: 157 MG/DL (ref 70–99)
GLUCOSE BLDC GLUCOMTR-MCNC: 160 MG/DL (ref 70–99)
GLUCOSE BLDC GLUCOMTR-MCNC: 175 MG/DL (ref 70–99)
GLUCOSE SERPL-MCNC: 178 MG/DL (ref 70–99)
GLUCOSE UR STRIP-MCNC: NEGATIVE MG/DL
HBA1C MFR BLD: 8.9 % (ref 0–5.6)
HCO3 BLDV-SCNC: 33 MMOL/L (ref 21–28)
HCT VFR BLD AUTO: 45.1 % (ref 35–47)
HGB BLD-MCNC: 15.5 G/DL (ref 11.7–15.7)
HGB UR QL STRIP: NEGATIVE
IMM GRANULOCYTES # BLD: 0 10E9/L (ref 0–0.4)
IMM GRANULOCYTES NFR BLD: 0.4 %
KETONES UR STRIP-MCNC: NEGATIVE MG/DL
LEUKOCYTE ESTERASE UR QL STRIP: ABNORMAL
LYMPHOCYTES # BLD AUTO: 3.3 10E9/L (ref 0.8–5.3)
LYMPHOCYTES NFR BLD AUTO: 32.2 %
MCH RBC QN AUTO: 28.2 PG (ref 26.5–33)
MCHC RBC AUTO-ENTMCNC: 34.4 G/DL (ref 31.5–36.5)
MCV RBC AUTO: 82 FL (ref 78–100)
MONOCYTES # BLD AUTO: 0.8 10E9/L (ref 0–1.3)
MONOCYTES NFR BLD AUTO: 8 %
MUCOUS THREADS #/AREA URNS LPF: PRESENT /LPF
NEUTROPHILS # BLD AUTO: 5.9 10E9/L (ref 1.6–8.3)
NEUTROPHILS NFR BLD AUTO: 57.6 %
NITRATE UR QL: NEGATIVE
NRBC # BLD AUTO: 0 10*3/UL
NRBC BLD AUTO-RTO: 0 /100
NT-PROBNP SERPL-MCNC: 261 PG/ML (ref 0–1800)
PCO2 BLDV: 53 MM HG (ref 40–50)
PH BLDV: 7.4 PH (ref 7.32–7.43)
PH UR STRIP: 6 PH (ref 5–7)
PLATELET # BLD AUTO: 206 10E9/L (ref 150–450)
PO2 BLDV: 26 MM HG (ref 25–47)
POTASSIUM SERPL-SCNC: 4.1 MMOL/L (ref 3.4–5.3)
PROT SERPL-MCNC: 7.3 G/DL (ref 6.8–8.8)
RBC # BLD AUTO: 5.5 10E12/L (ref 3.8–5.2)
RBC #/AREA URNS AUTO: 2 /HPF (ref 0–2)
SARS-COV-2 PCR COMMENT: NORMAL
SARS-COV-2 RNA SPEC QL NAA+PROBE: NEGATIVE
SARS-COV-2 RNA SPEC QL NAA+PROBE: NORMAL
SODIUM SERPL-SCNC: 131 MMOL/L (ref 133–144)
SOURCE: ABNORMAL
SP GR UR STRIP: 1.02 (ref 1–1.03)
SPECIMEN SOURCE: NORMAL
SPECIMEN SOURCE: NORMAL
SQUAMOUS #/AREA URNS AUTO: 1 /HPF (ref 0–1)
T4 FREE SERPL-MCNC: 1.81 NG/DL (ref 0.76–1.46)
TROPONIN I SERPL-MCNC: <0.015 UG/L (ref 0–0.04)
TSH SERPL DL<=0.005 MIU/L-ACNC: 4.06 MU/L (ref 0.4–4)
UROBILINOGEN UR STRIP-MCNC: 0 MG/DL (ref 0–2)
WBC # BLD AUTO: 10.3 10E9/L (ref 4–11)
WBC #/AREA URNS AUTO: 14 /HPF (ref 0–5)

## 2020-05-25 PROCEDURE — 83036 HEMOGLOBIN GLYCOSYLATED A1C: CPT | Performed by: EMERGENCY MEDICINE

## 2020-05-25 PROCEDURE — 25000132 ZZH RX MED GY IP 250 OP 250 PS 637: Performed by: FAMILY MEDICINE

## 2020-05-25 PROCEDURE — 96366 THER/PROPH/DIAG IV INF ADDON: CPT | Performed by: EMERGENCY MEDICINE

## 2020-05-25 PROCEDURE — 82803 BLOOD GASES ANY COMBINATION: CPT | Performed by: EMERGENCY MEDICINE

## 2020-05-25 PROCEDURE — 84443 ASSAY THYROID STIM HORMONE: CPT | Performed by: EMERGENCY MEDICINE

## 2020-05-25 PROCEDURE — 71046 X-RAY EXAM CHEST 2 VIEWS: CPT

## 2020-05-25 PROCEDURE — 99291 CRITICAL CARE FIRST HOUR: CPT | Mod: 25 | Performed by: EMERGENCY MEDICINE

## 2020-05-25 PROCEDURE — 81001 URINALYSIS AUTO W/SCOPE: CPT | Performed by: EMERGENCY MEDICINE

## 2020-05-25 PROCEDURE — 80053 COMPREHEN METABOLIC PANEL: CPT | Performed by: EMERGENCY MEDICINE

## 2020-05-25 PROCEDURE — 85025 COMPLETE CBC W/AUTO DIFF WBC: CPT | Performed by: EMERGENCY MEDICINE

## 2020-05-25 PROCEDURE — 99285 EMERGENCY DEPT VISIT HI MDM: CPT | Mod: 25 | Performed by: EMERGENCY MEDICINE

## 2020-05-25 PROCEDURE — 25800030 ZZH RX IP 258 OP 636: Performed by: EMERGENCY MEDICINE

## 2020-05-25 PROCEDURE — 20000003 ZZH R&B ICU

## 2020-05-25 PROCEDURE — 96375 TX/PRO/DX INJ NEW DRUG ADDON: CPT | Performed by: EMERGENCY MEDICINE

## 2020-05-25 PROCEDURE — 25000128 H RX IP 250 OP 636: Performed by: FAMILY MEDICINE

## 2020-05-25 PROCEDURE — 25000132 ZZH RX MED GY IP 250 OP 250 PS 637: Performed by: PHYSICIAN ASSISTANT

## 2020-05-25 PROCEDURE — 83880 ASSAY OF NATRIURETIC PEPTIDE: CPT | Performed by: EMERGENCY MEDICINE

## 2020-05-25 PROCEDURE — 25000128 H RX IP 250 OP 636: Performed by: PHYSICIAN ASSISTANT

## 2020-05-25 PROCEDURE — 99223 1ST HOSP IP/OBS HIGH 75: CPT | Mod: AI | Performed by: PHYSICIAN ASSISTANT

## 2020-05-25 PROCEDURE — 84484 ASSAY OF TROPONIN QUANT: CPT | Performed by: EMERGENCY MEDICINE

## 2020-05-25 PROCEDURE — 93005 ELECTROCARDIOGRAM TRACING: CPT | Performed by: EMERGENCY MEDICINE

## 2020-05-25 PROCEDURE — 25000132 ZZH RX MED GY IP 250 OP 250 PS 637: Performed by: EMERGENCY MEDICINE

## 2020-05-25 PROCEDURE — 87040 BLOOD CULTURE FOR BACTERIA: CPT | Performed by: EMERGENCY MEDICINE

## 2020-05-25 PROCEDURE — 87086 URINE CULTURE/COLONY COUNT: CPT | Performed by: FAMILY MEDICINE

## 2020-05-25 PROCEDURE — 84439 ASSAY OF FREE THYROXINE: CPT | Performed by: EMERGENCY MEDICINE

## 2020-05-25 PROCEDURE — 00000146 ZZHCL STATISTIC GLUCOSE BY METER IP

## 2020-05-25 PROCEDURE — 93010 ELECTROCARDIOGRAM REPORT: CPT | Mod: Z6 | Performed by: EMERGENCY MEDICINE

## 2020-05-25 PROCEDURE — 96365 THER/PROPH/DIAG IV INF INIT: CPT | Performed by: EMERGENCY MEDICINE

## 2020-05-25 PROCEDURE — 25000131 ZZH RX MED GY IP 250 OP 636 PS 637: Performed by: FAMILY MEDICINE

## 2020-05-25 PROCEDURE — 25000128 H RX IP 250 OP 636: Performed by: EMERGENCY MEDICINE

## 2020-05-25 PROCEDURE — 96376 TX/PRO/DX INJ SAME DRUG ADON: CPT | Performed by: EMERGENCY MEDICINE

## 2020-05-25 PROCEDURE — 25000125 ZZHC RX 250: Performed by: EMERGENCY MEDICINE

## 2020-05-25 RX ORDER — PROCHLORPERAZINE 25 MG
12.5 SUPPOSITORY, RECTAL RECTAL EVERY 12 HOURS PRN
Status: DISCONTINUED | OUTPATIENT
Start: 2020-05-25 | End: 2020-05-26 | Stop reason: HOSPADM

## 2020-05-25 RX ORDER — DEXTROSE MONOHYDRATE 25 G/50ML
25-50 INJECTION, SOLUTION INTRAVENOUS
Status: DISCONTINUED | OUTPATIENT
Start: 2020-05-25 | End: 2020-05-26 | Stop reason: HOSPADM

## 2020-05-25 RX ORDER — NICOTINE POLACRILEX 4 MG
15-30 LOZENGE BUCCAL
Status: DISCONTINUED | OUTPATIENT
Start: 2020-05-25 | End: 2020-05-26 | Stop reason: HOSPADM

## 2020-05-25 RX ORDER — AMOXICILLIN 250 MG
1 CAPSULE ORAL 2 TIMES DAILY PRN
Status: DISCONTINUED | OUTPATIENT
Start: 2020-05-25 | End: 2020-05-26 | Stop reason: HOSPADM

## 2020-05-25 RX ORDER — ONDANSETRON 4 MG/1
4 TABLET, ORALLY DISINTEGRATING ORAL EVERY 6 HOURS PRN
Status: DISCONTINUED | OUTPATIENT
Start: 2020-05-25 | End: 2020-05-26 | Stop reason: HOSPADM

## 2020-05-25 RX ORDER — DILTIAZEM HYDROCHLORIDE 120 MG/1
120 CAPSULE, COATED, EXTENDED RELEASE ORAL DAILY
Status: DISCONTINUED | OUTPATIENT
Start: 2020-05-26 | End: 2020-05-26 | Stop reason: HOSPADM

## 2020-05-25 RX ORDER — ONDANSETRON 4 MG/1
4 TABLET, ORALLY DISINTEGRATING ORAL EVERY 6 HOURS PRN
Status: DISCONTINUED | OUTPATIENT
Start: 2020-05-25 | End: 2020-05-25

## 2020-05-25 RX ORDER — ONDANSETRON 2 MG/ML
4 INJECTION INTRAMUSCULAR; INTRAVENOUS EVERY 6 HOURS PRN
Status: DISCONTINUED | OUTPATIENT
Start: 2020-05-25 | End: 2020-05-26 | Stop reason: HOSPADM

## 2020-05-25 RX ORDER — OMEGA-3-ACID ETHYL ESTERS 1 G/1
2 CAPSULE, LIQUID FILLED ORAL 2 TIMES DAILY
Status: DISCONTINUED | OUTPATIENT
Start: 2020-05-25 | End: 2020-05-26 | Stop reason: HOSPADM

## 2020-05-25 RX ORDER — ACETAMINOPHEN 325 MG/1
650 TABLET ORAL EVERY 4 HOURS PRN
Status: DISCONTINUED | OUTPATIENT
Start: 2020-05-25 | End: 2020-05-25

## 2020-05-25 RX ORDER — HEPARIN SODIUM 5000 [USP'U]/.5ML
5000 INJECTION, SOLUTION INTRAVENOUS; SUBCUTANEOUS EVERY 8 HOURS
Status: DISCONTINUED | OUTPATIENT
Start: 2020-05-25 | End: 2020-05-26 | Stop reason: HOSPADM

## 2020-05-25 RX ORDER — FUROSEMIDE 10 MG/ML
20 INJECTION INTRAMUSCULAR; INTRAVENOUS ONCE
Status: COMPLETED | OUTPATIENT
Start: 2020-05-25 | End: 2020-05-25

## 2020-05-25 RX ORDER — DILTIAZEM HYDROCHLORIDE 5 MG/ML
5 INJECTION INTRAVENOUS
Status: DISCONTINUED | OUTPATIENT
Start: 2020-05-25 | End: 2020-05-26 | Stop reason: HOSPADM

## 2020-05-25 RX ORDER — ACETAMINOPHEN 500 MG
500-1000 TABLET ORAL EVERY 8 HOURS PRN
Status: DISCONTINUED | OUTPATIENT
Start: 2020-05-25 | End: 2020-05-26 | Stop reason: HOSPADM

## 2020-05-25 RX ORDER — PROCHLORPERAZINE MALEATE 5 MG
5 TABLET ORAL EVERY 6 HOURS PRN
Status: DISCONTINUED | OUTPATIENT
Start: 2020-05-25 | End: 2020-05-26 | Stop reason: HOSPADM

## 2020-05-25 RX ORDER — ASPIRIN 81 MG/1
81 TABLET, CHEWABLE ORAL DAILY
Status: DISCONTINUED | OUTPATIENT
Start: 2020-05-25 | End: 2020-05-26 | Stop reason: HOSPADM

## 2020-05-25 RX ORDER — AMOXICILLIN 250 MG
2 CAPSULE ORAL 2 TIMES DAILY PRN
Status: DISCONTINUED | OUTPATIENT
Start: 2020-05-25 | End: 2020-05-26 | Stop reason: HOSPADM

## 2020-05-25 RX ORDER — FUROSEMIDE 10 MG/ML
40 INJECTION INTRAMUSCULAR; INTRAVENOUS 2 TIMES DAILY
Status: DISCONTINUED | OUTPATIENT
Start: 2020-05-25 | End: 2020-05-26 | Stop reason: HOSPADM

## 2020-05-25 RX ORDER — NALOXONE HYDROCHLORIDE 0.4 MG/ML
.1-.4 INJECTION, SOLUTION INTRAMUSCULAR; INTRAVENOUS; SUBCUTANEOUS
Status: DISCONTINUED | OUTPATIENT
Start: 2020-05-25 | End: 2020-05-26 | Stop reason: HOSPADM

## 2020-05-25 RX ORDER — VITAMIN B COMPLEX
TABLET ORAL DAILY
Status: DISCONTINUED | OUTPATIENT
Start: 2020-05-25 | End: 2020-05-26 | Stop reason: HOSPADM

## 2020-05-25 RX ORDER — DILTIAZEM HYDROCHLORIDE 30 MG/1
30 TABLET, FILM COATED ORAL EVERY 6 HOURS SCHEDULED
Status: COMPLETED | OUTPATIENT
Start: 2020-05-25 | End: 2020-05-26

## 2020-05-25 RX ORDER — FUROSEMIDE 10 MG/ML
10 INJECTION INTRAMUSCULAR; INTRAVENOUS ONCE
Status: DISCONTINUED | OUTPATIENT
Start: 2020-05-25 | End: 2020-05-25 | Stop reason: ALTCHOICE

## 2020-05-25 RX ORDER — CALCIUM CARBONATE 500(1250)
500 TABLET ORAL DAILY
Status: DISCONTINUED | OUTPATIENT
Start: 2020-05-25 | End: 2020-05-26 | Stop reason: HOSPADM

## 2020-05-25 RX ORDER — NITROGLYCERIN 0.4 MG/1
0.4 TABLET SUBLINGUAL EVERY 5 MIN PRN
Status: DISCONTINUED | OUTPATIENT
Start: 2020-05-25 | End: 2020-05-26 | Stop reason: HOSPADM

## 2020-05-25 RX ORDER — ONDANSETRON 2 MG/ML
4 INJECTION INTRAMUSCULAR; INTRAVENOUS EVERY 6 HOURS PRN
Status: DISCONTINUED | OUTPATIENT
Start: 2020-05-25 | End: 2020-05-25

## 2020-05-25 RX ORDER — LEVOTHYROXINE SODIUM 100 UG/1
100 TABLET ORAL DAILY
Status: DISCONTINUED | OUTPATIENT
Start: 2020-05-26 | End: 2020-05-26 | Stop reason: HOSPADM

## 2020-05-25 RX ORDER — NALOXONE HYDROCHLORIDE 0.4 MG/ML
.1-.4 INJECTION, SOLUTION INTRAMUSCULAR; INTRAVENOUS; SUBCUTANEOUS
Status: DISCONTINUED | OUTPATIENT
Start: 2020-05-25 | End: 2020-05-25

## 2020-05-25 RX ADMIN — ASPIRIN 81 MG 81 MG: 81 TABLET ORAL at 17:37

## 2020-05-25 RX ADMIN — MELATONIN 25 MCG: at 14:00

## 2020-05-25 RX ADMIN — FUROSEMIDE 20 MG: 10 INJECTION, SOLUTION INTRAMUSCULAR; INTRAVENOUS at 07:48

## 2020-05-25 RX ADMIN — OMEGA-3-ACID ETHYL ESTERS 2 G: 1 CAPSULE, LIQUID FILLED ORAL at 19:54

## 2020-05-25 RX ADMIN — FUROSEMIDE 40 MG: 10 INJECTION, SOLUTION INTRAMUSCULAR; INTRAVENOUS at 19:53

## 2020-05-25 RX ADMIN — HEPARIN SODIUM 5000 UNITS: 5000 INJECTION, SOLUTION INTRAVENOUS; SUBCUTANEOUS at 14:01

## 2020-05-25 RX ADMIN — DILTIAZEM HYDROCHLORIDE 5 MG: 5 INJECTION INTRAVENOUS at 06:54

## 2020-05-25 RX ADMIN — DILTIAZEM HYDROCHLORIDE 30 MG: 30 TABLET, FILM COATED ORAL at 18:27

## 2020-05-25 RX ADMIN — CALCIUM 500 MG: 500 TABLET ORAL at 14:00

## 2020-05-25 RX ADMIN — NITROGLYCERIN 0.4 MG: 0.4 TABLET, ORALLY DISINTEGRATING SUBLINGUAL at 05:35

## 2020-05-25 RX ADMIN — HEPARIN SODIUM 5000 UNITS: 5000 INJECTION, SOLUTION INTRAVENOUS; SUBCUTANEOUS at 19:54

## 2020-05-25 RX ADMIN — NITROGLYCERIN 0.4 MG: 0.4 TABLET, ORALLY DISINTEGRATING SUBLINGUAL at 05:55

## 2020-05-25 RX ADMIN — FUROSEMIDE 40 MG: 10 INJECTION, SOLUTION INTRAMUSCULAR; INTRAVENOUS at 14:01

## 2020-05-25 RX ADMIN — DILTIAZEM HYDROCHLORIDE 5 MG/HR: 5 INJECTION INTRAVENOUS at 07:48

## 2020-05-25 RX ADMIN — INSULIN GLARGINE 10 UNITS: 100 INJECTION, SOLUTION SUBCUTANEOUS at 17:22

## 2020-05-25 ASSESSMENT — ACTIVITIES OF DAILY LIVING (ADL)
ADLS_ACUITY_SCORE: 16

## 2020-05-25 ASSESSMENT — ENCOUNTER SYMPTOMS
WHEEZING: 0
LIGHT-HEADEDNESS: 0
APPETITE CHANGE: 0
BACK PAIN: 0
ACTIVITY CHANGE: 0
ABDOMINAL PAIN: 0
NAUSEA: 0
FATIGUE: 0
PALPITATIONS: 0
VOMITING: 0
DYSURIA: 0
NUMBNESS: 0
SHORTNESS OF BREATH: 1
UNEXPECTED WEIGHT CHANGE: 0
WEAKNESS: 0
CONFUSION: 1
CHILLS: 0
HEADACHES: 0
SORE THROAT: 0
FEVER: 0
CHEST TIGHTNESS: 0
COUGH: 0

## 2020-05-25 ASSESSMENT — MIFFLIN-ST. JEOR: SCORE: 1349.81

## 2020-05-25 NOTE — ED NOTES
"Pt is [place], a/o x 4. Comes from home where she lives with her two grandsons that provide her care. She states \"they both work out of the home and are very busy and around others for their job\" she denies them being sick/ill and no s/sx of SOB or cough. Pt has SOB and is on O2 at 4L/NC @ 93-94% which is new for her and has cough. Pt has increased SOB with any activity, however she is able to move well from ER bed to BSC with SBA only. She also states \"her grand kids take very good care of her at home\" continue to monitor   "

## 2020-05-25 NOTE — ED NOTES
Rosalva KATHLEEN and Jocelyn CHAVEZ received transfer of care. Upon pt rounding, she is currently on 4 L NC and maintaining sats at 93%. RA sats 86%. Pt is dyspenic at rest with activity intol. Increased WOB when assisted to bedside commode. Pt was not wearing a mask when RN's entered the room and pt. not in COVID PUI precaution. Pt is identified to have concern for COVID by Rosalva KATHLEEN and Jocelyn CHAVEZ. Pt has a cough, SOA, and hypoxia with no hx of lung disease. RN expressed concern for COVID with Dr. Rondon, pt has risk factors of living with her two grandsons that work outside of home. Pt has bilat LE edema with +4 pitting, lasix was given and a wiki-cath was placed for voiding plan. Pt on cardizem gtt @ 5mg/hr for rate control. O2 currently @ 4 L NC. sats 94%. RR 22 at rest, RR 30 with activity. Pt placed in COVID precautions after discussion with Dr. Rondon.

## 2020-05-25 NOTE — ED PROVIDER NOTES
Emergency Department Patient Sign-out       Brief HPI:  This is a 89 year old female signed out to me by Dr. HEVER Martins at shift change at 6.10AM .  Patient is awaiting final disposition which is likely admission to the hospital with concern for respiratory distress requiring oxygen support and clinical impression the patient has CHF with new onset atrial fibrillation with rapid ventricular response.   See Dr HEVER Martins's ED note for details of the presentation, ED course prior to hand-off and sign out, working diagnosis and clinical impression.  Patient presented by car with her daughter from home.  Patient lives alone and shortness of breath and was noted to have relative hypoxia she was about 90% on room air.  Patient was also noted to be hypertensive.  She was given  nitroglycerin sublingual tablet   with some improvement in her shortness of breath and work of breathing.  Patient was noted to have a 6 pound weight gain from February 2020 during her last office visit.  Patient is awaiting chest x-ray, and additional lab results disposition is admission- (determine floor vs ICU).    Significant Events after my assuming care: I spoke with patient's daughter- Georgia Delcid at 6.20am by phone who brought her to the department for further evaluation and confirmed she is POA- (established on 11/30/2015- no code status included in healthcare directive). I updated family of plan of care and handoff. Georgia can be reached- Cell #-[phone].  Home phone# [phone]. Patient is reported to have a healthcare directive.  Georgia reports that her mother has been quarantined at home for the last 2 months without any sick contacts or exposures.  She also reports her mother's been compliant with her prescribed medications and that she spends most of the day with her daughter and the family helps her with checking her blood glucose.  Patient is currently prescribed furosemide 20 mg daily, levothyroxine 112 mcg daily,  glipizide 5 mg.   I reviewed Dr. Martins's ED notes.  Patient was seen and examined at 6.50am.  She had some tachypnea but did not appear in significant distress.  Patient was 92% on 2 L nasal cannula.  Crackles in the bases. HR ranged from 105 to 120. Patient was awake, and alert GCS 15 she is hard of hearing.  Plan of care was reviewed with the patient.   After discussion with the nursing treating team at handoff at 7:20 AM PUI status was established, COVID-19 test ordered.    Reviewed EKG obtained at 0518.  EKG revealed left axis deviation with rapid atrial fibrillation with rate related changes.  Patient's troponin is normal on arrival BNP is 261 chest x-ray was reviewed independently showing cardiomegaly with bilateral pleural effusions left greater than right.  See additional details in interpreting radiology report below  TSH-4.06 today was 7.41 on February 20, 2020.  T4 was 121 was 1.50 on 2/28/20. A1c-8.9.  Patient degree of dyspnea is likely multifactorial with bilateral pleural effusion and now new onset rapid atrial fibrillation, need to exclude CHF and COVID-19 infection.  With patient's shortness of breath , presumed new onset rapid atrial fibrillation she will need to be admitted for further care and evaluation.  Reviewed COVID-19 testing with the admitting hospitalist.      I spoke with Dr.K Ford at 7.30am- who agreed to assume care upon handoff for admission. We reviewed level of care for admission-floor vs ICU with new onset atrial fibrillation- (presumed),  respiratory distress with tachypnea with some oxygen requirement on 2 L nasal cannula - 92% (up to 4L NC- 93-94% during ED course) likely ,  COVID-19 testing, and approach to managing patient's rapid A. fib with IV diltiazem drip and gentle diuresis (IV furosemide ordered during ED course).  I also reviewed my discussion with the daughter- (Laura CHAMBERLAIN) about patient's healthcare directive. Holding on empiric antibiotics in the absence of  fever, white count of 10, and no discrete infiltrate on chest x-ray imaging     Diagnosis : 1) Acute respiratory distress, rule out COVID-19                       2) Atrial fibrillation with rapid ventricular response (presumed new onset)                       3) History of hypothroidism-on Synthroid not adequately managed         ED to Inpatient Handoff:    Discussed with Dr. MEHUL Ford at 7.30am  Patient accepted for Inpatient Stay  Pending studies include  Blood cultures, COVID-19 PCR  Code Status: Not confirmed- (need to review HealthCare directive)            Critical Care Addendum    My initial assessment, based on my review of vital signs, focused history, physical exam, review of cardiac rhythm monitor, 12 lead ECG analysis, discussion with Family, interpretation of EKG and telemetry  and and clinical history and diagnostic results, established that Arsenio Means has respiratory insufficiency, a critical arrhythmia and severe tachycardia, which requires immediate intervention, and therefore she is critically ill.     After the initial assessment, the care team initiated medication therapy with Diltiazem infusion, IV furosemide to provide stabilization care. Due to the critical nature of this patient, I reassessed nursing observations, vital signs, physical exam, review of cardiac rhythm monitor, 12 lead ECG analysis, interpretation of EKG and telemetry tracing and respiratory status multiple times prior to her disposition.     Time also spent performing documentation, discussion with family to obtain medical information for decision making, reviewing test results and coordination of care.     Critical care time (excluding teaching time and procedures): 30 minutes.     ===============================================================  Chart Review  [address]  [id number]     ECHOCARDIOGRAM     Patient Name:  ARSENIO MEANS     Date of Procedure:  05/14/2012      INDICATION:  Hypoxia.     M-MODE MEASUREMENTS:   IV septum 0.9, LV end-diastole 4.2, posterior wall 1.0, LV   end-systole 2.3, left atrium 3.7, and aortic root is 3.2.     Internal dimension of the left ventricle is normal.  Left   ventricular wall thickness is normal.  There are no regional wall   motion abnormalities.  Left ventricular function is normal at   60%.  Left atrium is normal size.  Mitral valve is grossly   normal.  There is trace mitral insufficiency.  Aortic valve   tri-leaflet demonstrate opening motion.  There is no stenosis or   insufficiency.  Right ventricular function normal.  The tricuspid   valve is normal.  There is mild tricuspid insufficiency.   Estimated right-sided pressure of 35 mmHg.  There is no   significant of pericardial effusion.     CONCLUSION:   Normal LV and RV function.  Mild mitral and tricuspid   insufficiency.     Jose Mineshteganon Arabella     ================================================================    Exam:   Patient Vitals for the past 24 hrs:   BP Temp Temp src Pulse Heart Rate Resp SpO2 Weight   05/25/20 0850 (!) 181/165 -- -- 146 133 25 95 % --   05/25/20 0840 -- -- -- 115 104 21 95 % --   05/25/20 0830 -- -- -- 85 97 15 96 % --   05/25/20 0820 -- -- -- 117 95 14 93 % --   05/25/20 0810 (!) 133/126 -- -- 108 90 24 95 % --   05/25/20 0800 (!) 147/107 -- -- 121 109 22 96 % --   05/25/20 0709 -- -- -- -- 96 23 93 % --   05/25/20 0700 (!) 154/94 -- -- 101 101 29 91 % --   05/25/20 0652 -- -- -- -- 120 22 93 % --   05/25/20 0648 -- -- -- -- 111 15 93 % --   05/25/20 0646 -- -- -- -- 115 26 93 % --   05/25/20 0645 -- -- -- -- 133 22 93 % --   05/25/20 0638 -- -- -- -- 142 23 93 % --   05/25/20 0635 -- -- -- -- 138 26 94 % --   05/25/20 0630 (!) 155/121 -- -- 114 101 16 93 % --   05/25/20 0629 -- -- -- -- 103 23 93 % --   05/25/20 0625 -- -- -- -- 87 15 92 % --   05/25/20 0619 -- -- -- -- 93 13 91 % --   05/25/20 0615 -- -- -- -- 101 16 91 % --   05/25/20 0610 -- --  -- -- 100 26 92 % --   05/25/20 0606 -- -- -- -- 106 27 90 % --   05/25/20 0600 121/45 -- -- 113 117 24 91 % --   05/25/20 0553 (!) 160/94 -- -- -- -- 24 -- --   05/25/20 0535 -- -- -- -- 118 23 94 % --   05/25/20 0530 (!) 175/117 -- -- 138 113 23 93 % --   05/25/20 0525 -- -- -- -- 129 26 92 % --   05/25/20 0521 -- -- -- -- 135 28 90 % --   05/25/20 0519 -- -- -- -- 134 (!) 32 (!) 88 % --   05/25/20 0518 -- -- -- -- 113 27 (!) 89 % --   05/25/20 0515 (!) 174/131 98.4  F (36.9  C) Oral 144 144 24 91 % 97.1 kg (214 lb 1.1 oz)   05/25/20 0510 (!) 187/132 -- -- 144 129 29 90 % --           ED RESULTS:   Results for orders placed or performed during the hospital encounter of 05/25/20 (from the past 24 hour(s))   CBC with platelets differential     Status: Abnormal    Collection Time: 05/25/20  5:24 AM   Result Value Ref Range    WBC 10.3 4.0 - 11.0 10e9/L    RBC Count 5.50 (H) 3.8 - 5.2 10e12/L    Hemoglobin 15.5 11.7 - 15.7 g/dL    Hematocrit 45.1 35.0 - 47.0 %    MCV 82 78 - 100 fl    MCH 28.2 26.5 - 33.0 pg    MCHC 34.4 31.5 - 36.5 g/dL    RDW 11.9 10.0 - 15.0 %    Platelet Count 206 150 - 450 10e9/L    Diff Method Automated Method     % Neutrophils 57.6 %    % Lymphocytes 32.2 %    % Monocytes 8.0 %    % Eosinophils 1.2 %    % Basophils 0.6 %    % Immature Granulocytes 0.4 %    Nucleated RBCs 0 0 /100    Absolute Neutrophil 5.9 1.6 - 8.3 10e9/L    Absolute Lymphocytes 3.3 0.8 - 5.3 10e9/L    Absolute Monocytes 0.8 0.0 - 1.3 10e9/L    Absolute Eosinophils 0.1 0.0 - 0.7 10e9/L    Absolute Basophils 0.1 0.0 - 0.2 10e9/L    Abs Immature Granulocytes 0.0 0 - 0.4 10e9/L    Absolute Nucleated RBC 0.0    Comprehensive metabolic panel     Status: Abnormal    Collection Time: 05/25/20  5:24 AM   Result Value Ref Range    Sodium 131 (L) 133 - 144 mmol/L    Potassium 4.1 3.4 - 5.3 mmol/L    Chloride 96 94 - 109 mmol/L    Carbon Dioxide 28 20 - 32 mmol/L    Anion Gap 7 3 - 14 mmol/L    Glucose 178 (H) 70 - 99 mg/dL    Urea  Nitrogen 14 7 - 30 mg/dL    Creatinine 0.69 0.52 - 1.04 mg/dL    GFR Estimate 75 >60 mL/min/[1.73_m2]    GFR Estimate If Black 87 >60 mL/min/[1.73_m2]    Calcium 8.9 8.5 - 10.1 mg/dL    Bilirubin Total 0.7 0.2 - 1.3 mg/dL    Albumin 3.5 3.4 - 5.0 g/dL    Protein Total 7.3 6.8 - 8.8 g/dL    Alkaline Phosphatase 67 40 - 150 U/L    ALT 31 0 - 50 U/L    AST 23 0 - 45 U/L   Troponin I     Status: None    Collection Time: 05/25/20  5:24 AM   Result Value Ref Range    Troponin I ES <0.015 0.000 - 0.045 ug/L   NT pro BNP     Status: None    Collection Time: 05/25/20  5:24 AM   Result Value Ref Range    N-Terminal Pro BNP Inpatient 261 0 - 1,800 pg/mL   TSH with free T4 reflex     Status: Abnormal    Collection Time: 05/25/20  5:24 AM   Result Value Ref Range    TSH 4.06 (H) 0.40 - 4.00 mU/L   T4 free     Status: Abnormal    Collection Time: 05/25/20  5:24 AM   Result Value Ref Range    T4 Free 1.81 (H) 0.76 - 1.46 ng/dL   Hemaglobin A1C     Status: Abnormal    Collection Time: 05/25/20  5:24 AM   Result Value Ref Range    Hemoglobin A1C 8.9 (H) 0 - 5.6 %   XR Chest 2 Views     Status: None    Collection Time: 05/25/20  5:47 AM    Narrative    EXAM: XR CHEST 2 VW  LOCATION: [facility]  DATE/TIME: 5/25/2020 5:39 AM    INDICATION: Dyspnea.  COMPARISON: 05/22/2012.      Impression    IMPRESSION: Moderate left pleural effusion and probable small right pleural effusion. Cardiomegaly, vascular congestion and perihilar interstitial prominence. Findings suggest CHF.   UA reflex to Microscopic and Culture     Status: Abnormal    Collection Time: 05/25/20  7:41 AM    Specimen: Midstream Urine   Result Value Ref Range    Color Urine Yellow     Appearance Urine Slightly Cloudy     Glucose Urine Negative NEG^Negative mg/dL    Bilirubin Urine Negative NEG^Negative    Ketones Urine Negative NEG^Negative mg/dL    Specific Gravity Urine 1.018 1.003 - 1.035    Blood Urine Negative NEG^Negative    pH Urine 6.0 5.0 - 7.0 pH     Protein Albumin Urine 30 (A) NEG^Negative mg/dL    Urobilinogen mg/dL 0.0 0.0 - 2.0 mg/dL    Nitrite Urine Negative NEG^Negative    Leukocyte Esterase Urine Large (A) NEG^Negative    Source Midstream Urine     RBC Urine 2 0 - 2 /HPF    WBC Urine 14 (H) 0 - 5 /HPF    Squamous Epithelial /HPF Urine 1 0 - 1 /HPF    Mucous Urine Present (A) NEG^Negative /LPF       ED MEDICATIONS:   Medications   nitroGLYcerin (NITROSTAT) sublingual tablet 0.4 mg (0.4 mg Sublingual Given 5/25/20 0555)   diltiazem (CARDIZEM) injection 5 mg (5 mg Intravenous Given 5/25/20 0654)   diltiazem (CARDIZEM) 125 mg in sodium chloride 0.9 % 125 mL infusion (5 mg/hr Intravenous New Bag 5/25/20 0748)   furosemide (LASIX) injection 20 mg (20 mg Intravenous Given 5/25/20 0748)         Impression:    ICD-10-CM    1. Dyspnea, unspecified type  R06.00 UA reflex to Microscopic and Culture     TSH with free T4 reflex     T4 free     T4 free     Symptomatic COVID-19 Virus (Coronavirus) by PCR     Blood culture     Hemaglobin A1C   2. New onset a-fib (H)  I48.91     Presumed new onset with rapid ventricular response   3. History of hypothyroidism  Z86.39        Plan:    Admit for further care for concern new-onset atrial fibrillation, COVID-19 pending.      MD Nela Sandy Ebenezer Tope, MD  05/25/20 0951

## 2020-05-25 NOTE — PLAN OF CARE
"Patient assisted up to bed side commode several times to void.  Gait steady, patient gets out of bed independently. Denies feeling short of breath at rest or with activity. Patient states her \"breathing is better now\".  Patient does have 2+ bilateral lower extremity edema.  Decreased supplemental oxygen to 2L/min via nasal canula, with oxygen sats 92-94%.  Denies pain with deep breathing. Patient very hard of hearing, using pocket talker.    "

## 2020-05-25 NOTE — ED TRIAGE NOTES
"Pt states SOA since last night. Pt states she has fluid in her legs which \"goes up and down.\" + pitting edema noted to bilateral legs. Pt speaking in-complete sentences.  "

## 2020-05-25 NOTE — H&P
[facility] History and Physical    Adela Means MRN# [id number]   Age: 89 year old YOB: 1927     Date of Admission:  5/25/2020      Primary care provider: Yoselyn Castrejon          Assessment and Plan:   Assessment & Plan       Atrial fibrillation with rapid ventricular response   5/25/2020 -- appears to be new onset.  Started on diltiazem drip in ER, rate normalized with this. CHADS-VASc2 = 3 - 5 (based on whether you count new CHF and hypertension into calculation). Discussed risk/benefit of anticoagulation, patient was willing to accept risk.   - Continue diltiazem drip, wean as able  - Prn IV diltiazem for sustained tachycardia > 110  - Initiate oral diltiazem (30 mg q 6 h tonight, then start Cardizem  mg in am on 5/26)  - Echo ordered for tomorrow as below  - Placed request that pharmacy check cost for possible discharge on eliquis vs xarelto  - Continue prior to admission aspirin for now until anticoagulation initiated    Suspected Heart failure   5/25/2020 -- clinical picture and chest x-ray consistent with this.  CHF likely due to atrial fibrillation with rapid ventricular response as above, checking echo.  Was given 20 mg IV lasix in the emergency department.  - Continue aspirin until anticoagulation initiated  - Hold home lasix 20 mg daily  - IV lasix 40 twice daily on admission  - Follow daily weights, I&Os.  - Echo to be done 5/26  - Consider staring lisinopril pending echo results and blood pressure overnight as below.      Shortness of breath with acute hypoxic respiratory failure  5/25/2020 -- appears due to atrial fibrillation and heart failure as above.  COVID sent from ER but I think this is highly unlikely.  - COVID negative on 5/25, low suspicion, can remove precautions  - Continue supplemental O2 to maintain sats > 92%, wean as able    Hypothyroidism  5/25/2020 -- on replacement.  TSH mildly up but so is T4 - in context of atrial fibrillation with rapid  "ventricular response we'll back off on dosage and start 100 mcg daily tomorrow in place of 112.      Hyponatremia  5/25/2020 -- mild, follow.    Abnormal UA - asymptomatic bacteruria vs UTI  Admit UA with large leukocyte esterase and mild pyuria (WBC 14). Afebrile, no leukocytosis.   - No antibiotics initiated, likely asymptomatic bacteruria  - Monitor for signs/symptoms of infection  - Urine culture & blood cultures pending    Benign essential hypertension, BP <140/90  5/25/2020 -- blood pressure mildly up, was given some SL nitroglycerine in the emergency department with improvement.   - Diltiazem drip as above  - Continue lasix as above  - Consider adding lisinopril tomorrow pending echo results and blood pressure overnight    Type 2 diabetes mellitus with hyperglycemia, without long-term current use of insulin   A1c 8.9. Managed prior to admission with Lantus 10 U daily at 4 pm, and glipizide 15 mg q am.  5/25/2020 -- continue home Lantus, holding glipizide, replaced with medium dose sliding scale insulin while inpatient.     Dementia without behavioral disturbance, unspecified dementia type  Is A&O on admission, seems to follow conversations adequately and capable of making her own decisions.  - No intervention     Prophylaxis  Subcutaneous Heparin, plan to initiate anticoagulation for atrial fibrillation as above    Lines  PIV       Disposition  Expect to be hospitalized 1-2 more days; possibly discharge tomorrow after echo if respiratory status and heart rate are stable and successfully weaned from supplemental O2. Likely discharge home.            Chief Complaint:   \"I couldn't breathe\"  History is obtained from the patient, review of EMR, and emergency department documentation.          History of Present Illness:   This patient is a 89 year old  female with a significant past medical history of type 2 diabetes mellitus, hypertension, and hypothyroidism who presents with progressive shortness of " "breath.    Patient has been having some mild shortness of breath for the past 2-3 days, which became significantly worse yesterday and prevented her from sleep overnight. She decided to go to the emergency department for evaluation very early this morning because she couldn't breathe.    She was short of breath at rest, but dyspnea worsened with any exertion. Reports symptoms of orthopnea. No known weight gain, but states her legs have been getting puffier over the past week (which she attributes to the weather). Denies any associated cough, wheeze, fever, chills, or recent illness associated with her respiratory complaints.     She reports feeling occasional rapid heart rate, but no chest pain.     About 3 weeks ago she was ill with some nausea and vomiting, but has since resolved without recurrence. No associated diarrhea or constipation.     Denies dysuria.     Patient states her legs have been weak lately which she attributes to the poor weather. No falls or syncope, but at times has been lightheaded lately. Intermittent headaches. The remainder review of systems is negative.             Past Medical History:   I have reviewed this patient's past medical history.  Patient Active Problem List    Diagnosis Date Noted     Shortness of breath 05/25/2020     Priority: Medium     Atrial fibrillation with rapid ventricular response (H) 05/25/2020     Priority: Medium     Heart failure (H) 05/25/2020     Priority: Medium     Dementia without behavioral disturbance, unspecified dementia type (H) 02/28/2020     Priority: Medium     Type 2 diabetes mellitus with hyperglycemia, without long-term current use of insulin (H) 03/13/2017     Priority: Medium     Seborrheic dermatitis 01/23/2014     Priority: Medium     Advanced directives, counseling/discussion 02/21/2012     Priority: Medium     2-21-12 \"I think I have one, my daughter, [place] would know\" (to bring in to clinic if she has one)       Gastritis 10/21/2009     " Priority: Medium     EGD 10/13/2009, diffuse gastritis, worse in antral region.       Hepatic cyst 10/20/2009     Priority: Medium     CT 10/2008 - Stable       Hypertriglyceridemia, LDL goal <100 10/20/2009     Priority: Medium     Osteopenia 10/20/2009     Priority: Medium     11/08 - Lumbar T-score 0.8, right femur -1.0       DJD (degenerative joint disease) 10/20/2009     Priority: Medium     2001       Benign essential hypertension, BP <140/90 10/19/2009     Priority: Medium     Knee pain 10/19/2009     Priority: Medium     Acquired hypothyroidism 09/02/2009     Priority: Medium                       Past Surgical History:   I have reviewed this patient's past surgical history   Past Surgical History:   Procedure Laterality Date     APPENDECTOMY  age 18     CHOLECYSTECTOMY, OPEN       HYSTERECTOMY, VAGINAL               Social History:   I have reviewed this patient's social history   Social History     Tobacco Use     Smoking status: Never Smoker     Smokeless tobacco: Never Used   Substance Use Topics     Alcohol use: Not Currently             Family History:   I have reviewed this patient's family history  Family History   Problem Relation Age of Onset     No Known Problems Daughter      No Known Problems Daughter      No Known Problems Daughter              Immunizations:   Immunizations are current          Allergies:     Allergies   Allergen Reactions     Ibuprofen Swelling     Of lips     Vitamin C Swelling     Of lips, But is fine with orange juice             Medications:     Facility-Administered Medications Prior to Admission   Medication Dose Route Frequency Provider Last Rate Last Dose     ropivacaine (NAROPIN) injection 3 mL  3 mL   Juan J Caballero DO   3 mL at 01/07/20 1400     triamcinolone (KENALOG-40) injection 40 mg  40 mg   Juan J Caballero DO   40 mg at 01/07/20 1400     Medications Prior to Admission   Medication Sig Dispense Refill Last Dose     aspirin (ASA) 81 MG tablet  Take 1 tablet (81 mg) by mouth daily 90 tablet 3 2020 at 1400     calcium carbonate (OS-KATHY) 1500 (600 Ca) MG tablet TAKE 1 TABLET BY MOUTH TWICE DAILY WITH MEALS 180 tablet 0 2020 at 1400     furosemide (LASIX) 20 MG tablet Take 1 tablet by mouth once daily 90 tablet 0 2020 at 0800     glipiZIDE 5 MG PO 24 hr tablet Take 3 tablets (15 mg) by mouth once daily with breakfast. 270 tablet 1 2020 at 0800     insulin glargine (LANTUS SOLOSTAR) 100 UNIT/ML pen Take 10 units at 4 pm daily (increase by 2 units every 4 days until am blood sugars are under 150) 15 mL 1 2020 at 1600     ketoconazole (NIZORAL) 2 % external shampoo APPLY TO AFFECTED AREA(S) AND WASH OFF AFTER 5 MINUTES USE 2 TO 3 TIMES PER WEEK 120 mL 2 Past Week at Unknown time     levothyroxine (EUTHYROX) 112 MCG tablet Take 1 tablet by mouth daily in the morning on an empty stomach, and 1 hour before other medications/food. DUE FOR LABS before next refill 90 tablet 0 2020 at 0700     omega-3 acid ethyl esters (LOVAZA) 1 g capsule Take 2 capsules (2 g) by mouth 2 times daily 120 capsule 11 2020 at 1400     Vitamin D, Cholecalciferol, 25 MCG (1000 UT) TABS Take 1 tablet by mouth once daily 90 tablet 0 2020 at 1400     ACE/ARB NOT PRESCRIBED, INTENTIONAL, Please choose reason not prescribed, below        acetaminophen (TYLENOL) 500 MG tablet Take 1-2 tablets (500-1,000 mg) by mouth every 8 hours as needed for mild pain 30 tablet 0 Unknown at Unknown time     [] blood glucose (CONTOUR NEXT TEST) test strip Use to test blood sugar 1 time daily in the morning before breakfast 100 each 4      blood glucose monitoring (PEREZ MICROLET) lancets Use to test blood sugar 1 times daily 100 each 4      insulin pen needle (BD NAEEM U/F) 32G X 4 MM miscellaneous Use 1 daily as directed. 100 each 4              Review of Systems:   Negative 10 point review of systems other than what is stated in HPI.          Physical Exam:   Blood  "pressure (!) 147/83, pulse 69, temperature 97.7  F (36.5  C), temperature source Oral, resp. rate 14, height 1.664 m (5' 5.5\"), weight 93.6 kg (206 lb 5.6 oz), SpO2 93 %, not currently breastfeeding.  Temperatures:  Current - Temp: 97.7  F (36.5  C); Max - Temp  Av.1  F (36.7  C)  Min: 97.7  F (36.5  C)  Max: 98.4  F (36.9  C)  Respiration range: Resp  Av.6  Min: 13  Max: 32  Pulse range: Pulse  Av.1  Min: 85  Max: 146  Blood pressure range: Systolic (24hrs), Av , Min:121 , Max:187   ; Diastolic (24hrs), Av, Min:45, Max:165    Pulse oximetry range: SpO2  Av.5 %  Min: 88 %  Max: 96 %    Intake/Output Summary (Last 24 hours) at 2020 1244  Last data filed at 2020 1150  Gross per 24 hour   Intake 5 ml   Output 1300 ml   Net -1295 ml     Constitutional: Alert, oriented, cooperative, no apparent distress, appears nontoxic, speaking in full sentences. Is hard of hearing, using pocket talker.    Eyes: Eyes are clear, pupils are reactive. No scleral icterus.     HEENT: Oropharynx is clear and moist, no lesions. Normocephalic, no evidence of cranial trauma.      Cardiovascular: Slightly irregularly irregular rhythm, normal rate, normal S1 and S2. No murmur, rubs, or gallops. Peripheral pulses intact bilaterally. Bilateral +2 lower extremity edema.    Respiratory: Lung sounds are clear to auscultation bilaterally without wheezes, rhonchi, or crackles.    GI: Soft, non-distended. Non-tender, no rebound or guarding. No hepatosplenomegaly or masses appreciated. Normal bowel sounds.     Musculoskeletal: Without obvious deformity, normal range of motion. Normal muscle bulk and tone. Distal CMS intact.      Skin: Warm and dry, no rashes or ecchymoses. No mottling of skin.      Neurologic: Patient moves all extremities.  is symmetric. Gross strength and sensation are equal bilaterally.    Genitourinary: Deferred            Data:     Results for orders placed or performed during the " hospital encounter of 05/25/20 (from the past 24 hour(s))   CBC with platelets differential   Result Value Ref Range    WBC 10.3 4.0 - 11.0 10e9/L    RBC Count 5.50 (H) 3.8 - 5.2 10e12/L    Hemoglobin 15.5 11.7 - 15.7 g/dL    Hematocrit 45.1 35.0 - 47.0 %    MCV 82 78 - 100 fl    MCH 28.2 26.5 - 33.0 pg    MCHC 34.4 31.5 - 36.5 g/dL    RDW 11.9 10.0 - 15.0 %    Platelet Count 206 150 - 450 10e9/L    Diff Method Automated Method     % Neutrophils 57.6 %    % Lymphocytes 32.2 %    % Monocytes 8.0 %    % Eosinophils 1.2 %    % Basophils 0.6 %    % Immature Granulocytes 0.4 %    Nucleated RBCs 0 0 /100    Absolute Neutrophil 5.9 1.6 - 8.3 10e9/L    Absolute Lymphocytes 3.3 0.8 - 5.3 10e9/L    Absolute Monocytes 0.8 0.0 - 1.3 10e9/L    Absolute Eosinophils 0.1 0.0 - 0.7 10e9/L    Absolute Basophils 0.1 0.0 - 0.2 10e9/L    Abs Immature Granulocytes 0.0 0 - 0.4 10e9/L    Absolute Nucleated RBC 0.0    Comprehensive metabolic panel   Result Value Ref Range    Sodium 131 (L) 133 - 144 mmol/L    Potassium 4.1 3.4 - 5.3 mmol/L    Chloride 96 94 - 109 mmol/L    Carbon Dioxide 28 20 - 32 mmol/L    Anion Gap 7 3 - 14 mmol/L    Glucose 178 (H) 70 - 99 mg/dL    Urea Nitrogen 14 7 - 30 mg/dL    Creatinine 0.69 0.52 - 1.04 mg/dL    GFR Estimate 75 >60 mL/min/[1.73_m2]    GFR Estimate If Black 87 >60 mL/min/[1.73_m2]    Calcium 8.9 8.5 - 10.1 mg/dL    Bilirubin Total 0.7 0.2 - 1.3 mg/dL    Albumin 3.5 3.4 - 5.0 g/dL    Protein Total 7.3 6.8 - 8.8 g/dL    Alkaline Phosphatase 67 40 - 150 U/L    ALT 31 0 - 50 U/L    AST 23 0 - 45 U/L   Troponin I   Result Value Ref Range    Troponin I ES <0.015 0.000 - 0.045 ug/L   NT pro BNP   Result Value Ref Range    N-Terminal Pro BNP Inpatient 261 0 - 1,800 pg/mL   TSH with free T4 reflex   Result Value Ref Range    TSH 4.06 (H) 0.40 - 4.00 mU/L   T4 free   Result Value Ref Range    T4 Free 1.81 (H) 0.76 - 1.46 ng/dL   Hemaglobin A1C   Result Value Ref Range    Hemoglobin A1C 8.9 (H) 0 - 5.6 %   XR  Chest 2 Views    Narrative    EXAM: XR CHEST 2 VW  LOCATION: [facility]  DATE/TIME: 5/25/2020 5:39 AM    INDICATION: Dyspnea.  COMPARISON: 05/22/2012.      Impression    IMPRESSION: Moderate left pleural effusion and probable small right pleural effusion. Cardiomegaly, vascular congestion and perihilar interstitial prominence. Findings suggest CHF.   UA reflex to Microscopic and Culture    Specimen: Midstream Urine   Result Value Ref Range    Color Urine Yellow     Appearance Urine Slightly Cloudy     Glucose Urine Negative NEG^Negative mg/dL    Bilirubin Urine Negative NEG^Negative    Ketones Urine Negative NEG^Negative mg/dL    Specific Gravity Urine 1.018 1.003 - 1.035    Blood Urine Negative NEG^Negative    pH Urine 6.0 5.0 - 7.0 pH    Protein Albumin Urine 30 (A) NEG^Negative mg/dL    Urobilinogen mg/dL 0.0 0.0 - 2.0 mg/dL    Nitrite Urine Negative NEG^Negative    Leukocyte Esterase Urine Large (A) NEG^Negative    Source Midstream Urine     RBC Urine 2 0 - 2 /HPF    WBC Urine 14 (H) 0 - 5 /HPF    Squamous Epithelial /HPF Urine 1 0 - 1 /HPF    Mucous Urine Present (A) NEG^Negative /LPF   Symptomatic COVID-19 Virus (Coronavirus) by PCR    Specimen: Nasopharyngeal   Result Value Ref Range    COVID-19 Virus PCR to [facility] - Source Nasopharyngeal     COVID-19 Virus PCR to [facility] - Result       Test received-See reflex to IDDL test SARS CoV2 (COVID-19) Virus RT-PCR   Blood culture    Specimen: Blood    Right Arm   Result Value Ref Range    Specimen Description Blood Right Arm     Culture Micro PENDING    SARS-CoV-2 COVID-19 Virus (Coronavirus) RT-PCR Nasopharyngeal    Specimen: Nasopharyngeal   Result Value Ref Range    SARS-CoV-2 Virus Specimen Source Nasopharyngeal     SARS-CoV-2 PCR Result NEGATIVE     SARS-CoV-2 PCR Comment       The Simplexa COVID-19 direct PCR assay by "[employer] on the [employer] instrument has been   given Emergency Use Authorization (EUA) for the in vitro qualitative detection  of RNA from   the SARS-CoV2 virus in nasopharyngeal swabs in viral transport medium from patients with   signs and symptoms of infection who are suspected of COVID-19. Performance is unknown in   asymptomatic patients.     Blood gas venous   Result Value Ref Range    Ph Venous 7.40 7.32 - 7.43 pH    PCO2 Venous 53 (H) 40 - 50 mm Hg    PO2 Venous 26 25 - 47 mm Hg    Bicarbonate Venous 33 (H) 21 - 28 mmol/L    Base Excess Venous 6.0 mmol/L   Glucose by meter   Result Value Ref Range    Glucose 160 (H) 70 - 99 mg/dL     EKG results:   Performed today        Read as atrial fibrillation with RVR, although there may be p-waves in there.     Chest x-ray:   Pleural effusion on the left  Pulmonary edema       Attestation:  I have reviewed today's vital signs, notes, medications, labs and imaging.  Amount of time performed on this history and physical: 55 minutes.      Rosaline Yip PA-C on 5/25/2020 at 5:19 PM

## 2020-05-25 NOTE — PROGRESS NOTES
[facility] [place]    Patient admitted to room 1002 at approximately 0930 via cart from emergency room. Patient was accompanied by nurse.     Verbal SBAR report received from GRABIEL Blackwell prior to patient arrival.     Patient ambulated to bed with one assist. Patient alert and oriented X 2. The patient is not having any pain. 0-10 Pain Scale: 0. Admission vital signs: Blood pressure (!) 181/165, pulse 146, temperature 98.3  F (36.8  C), temperature source Oral, resp. rate 25, weight 97.1 kg (214 lb 1.1 oz), SpO2 95 %, not currently breastfeeding. Patient was oriented to plan of care, call light, bed controls, tv, telephone, bathroom and visiting hours.     Risk Assessment    The following safety risks were identified during admission: fall. Yellow risk band applied: YES.     Skin Initial Assessment    This writer admitted this patient and completed a full skin assessment and Shakeel score in the Adult PCS flowsheet. Appropriate interventions initiated as needed.     Secondary skin check completed by GRABIEL Heart.         Education    Patient has a [place] to Observation order: No  Observation education completed and documented: N/A

## 2020-05-25 NOTE — ED PROVIDER NOTES
"  History     Chief Complaint   Patient presents with     Shortness of Breath     Pt states SOA since last night. Denies cough and fever.     HPI  Adela Means is a 89 year old female with history of hypertension, hypothyroidism, type 2 diabetes, and dementia presenting for evaluation of increasing difficulty breathing.  Patient reports symptoms began yesterday morning, continuing through the day and overnight.  She reports breathing is worse with exertion as well as laying down making impossible for her to sleep overnight.  Denies any associated chest pain, diaphoresis, nausea, or vomiting.  Patient denies any previous history of trouble breathing similar to this.  Reports a mild dry cough.  Denies fever or chills.  Patient does report swelling in her lower legs worse over the past few days but she reports her legs routinely vary in their amount of swelling.  Denies any injury.  Denies abdominal pain.  Does report some mild constipation recently.      ==================================================================    CHART REVIEW:    Office visit 2/28/20: Initial /74   Pulse 68   Temp 98.4  F (36.9  C) (Tympanic)   Resp 20   Ht 1.664 m (5' 5.5\")   Wt 94.3 kg (208 lb)   SpO2 93%   BMI 34.09 kg/m       ASSESSMENT & PLAN      1. Type 2 diabetes mellitus with hyperglycemia, without long-term current use of insulin (H)  Chronic, uncontrolled  Will start Lantus 10 unites subcutaneous injection daily  Will f/u with Alisha Dronen      2. Benign essential hypertension, BP <140/90  Chronic, stable  - Comprehensive metabolic panel     3. Hypertriglyceridemia, LDL goal <100  Chronic, stable     4. Acquired hypothyroidism  Chronic, stable  - TSH with free T4 reflex     5. Elevated random blood glucose level  Acute d/t uncontrolled Diabetes  - CBC with platelets  - Comprehensive metabolic panel     6. Confusion  Acute d/t uncontrolled Diabetes  - UA reflex to Microscopic and Culture  - CBC with platelets  - " Comprehensive metabolic panel     7. Shaky  Acute d/t uncontrolled Diabetes  - UA reflex to Microscopic and Culture  - CBC with platelets  - Comprehensive metabolic panel     8. Dementia without behavioral disturbance, unspecified dementia type (H)  Chronic, worsened  SLUMS dementia score: 10  Recommendation for Neuropsychiatry evaluation and possible medications  Discuss with family, if this is how you would like to proceed, I'll make referral        Cardiac Echo:  Date of Procedure:  05/14/2012     INDICATION:  Hypoxia.     M-MODE MEASUREMENTS:   IV septum 0.9, LV end-diastole 4.2, posterior wall 1.0, LV   end-systole 2.3, left atrium 3.7, and aortic root is 3.2.     Internal dimension of the left ventricle is normal.  Left   ventricular wall thickness is normal.  There are no regional wall   motion abnormalities.  Left ventricular function is normal at   60%.  Left atrium is normal size.  Mitral valve is grossly   normal.  There is trace mitral insufficiency.  Aortic valve   tri-leaflet demonstrate opening motion.  There is no stenosis or   insufficiency.  Right ventricular function normal.  The tricuspid   valve is normal.  There is mild tricuspid insufficiency.   Estimated right-sided pressure of 35 mmHg.  There is no   significant of pericardial effusion.     CONCLUSION:   Normal LV and RV function.  Mild mitral and tricuspid   insufficiency.       END CHART REVIEW  ==================================================================      Allergies:  Allergies   Allergen Reactions     Ibuprofen Swelling     Of lips     Vitamin C Swelling     Of lips, But is fine with orange juice       Problem List:    Patient Active Problem List    Diagnosis Date Noted     Dementia without behavioral disturbance, unspecified dementia type (H) 02/28/2020     Priority: Medium     Type 2 diabetes mellitus with hyperglycemia, without long-term current use of insulin (H) 03/13/2017     Priority: Medium     Seborrheic dermatitis  "01/23/2014     Priority: Medium     Advanced directives, counseling/discussion 02/21/2012     Priority: Medium     2-21-12 \"I think I have one, my daughter, Georgia would know\" (to bring in to clinic if she has one)       Gastritis 10/21/2009     Priority: Medium     EGD 10/13/2009, diffuse gastritis, worse in antral region.       Hepatic cyst 10/20/2009     Priority: Medium     CT 10/2008 - Stable       Hypertriglyceridemia, LDL goal <100 10/20/2009     Priority: Medium     Osteopenia 10/20/2009     Priority: Medium     11/08 - Lumbar T-score 0.8, right femur -1.0       DJD (degenerative joint disease) 10/20/2009     Priority: Medium     2001       Benign essential hypertension, BP <140/90 10/19/2009     Priority: Medium     Knee pain 10/19/2009     Priority: Medium     Acquired hypothyroidism 09/02/2009     Priority: Medium                 Past Medical History:    Past Medical History:   Diagnosis Date     Basal cell carcinoma      Hepatic cyst      Hypertriglyceridemia      Osteopenia        Past Surgical History:    Past Surgical History:   Procedure Laterality Date     APPENDECTOMY  age 18     CHOLECYSTECTOMY, OPEN       HYSTERECTOMY, VAGINAL         Family History:    No family history on file.    Social History:  Marital Status:   [5]  Social History     Tobacco Use     Smoking status: Never Smoker     Smokeless tobacco: Never Used   Substance Use Topics     Alcohol use: Not Currently     Drug use: No        Medications:    ACE/ARB NOT PRESCRIBED, INTENTIONAL,  acetaminophen (TYLENOL) 500 MG tablet  aspirin (ASA) 81 MG tablet  blood glucose monitoring (PEREZ MICROLET) lancets  calcium carbonate (OS-KATHY) 1500 (600 Ca) MG tablet  furosemide (LASIX) 20 MG tablet  glipiZIDE 5 MG PO 24 hr tablet  insulin glargine (LANTUS SOLOSTAR) 100 UNIT/ML pen  insulin pen needle (BD NAEEM U/F) 32G X 4 MM miscellaneous  ketoconazole (NIZORAL) 2 % external shampoo  levothyroxine (EUTHYROX) 112 MCG tablet  omega-3 acid " ethyl esters (LOVAZA) 1 g capsule  Vitamin D, Cholecalciferol, 25 MCG (1000 UT) TABS          Review of Systems   Constitutional: Negative for activity change, appetite change, chills, fatigue, fever and unexpected weight change.   HENT: Negative for congestion and sore throat.    Respiratory: Positive for shortness of breath. Negative for cough, chest tightness and wheezing.    Cardiovascular: Positive for leg swelling. Negative for chest pain and palpitations.   Gastrointestinal: Negative for abdominal pain, nausea and vomiting.   Genitourinary: Negative for dysuria.   Musculoskeletal: Negative for back pain.   Skin: Negative for rash.   Neurological: Negative for weakness, light-headedness, numbness and headaches.   Psychiatric/Behavioral: Positive for confusion (chronic, unchanged).   All other systems reviewed and are negative.      Physical Exam   BP: (!) 187/132  Pulse: 144  Heart Rate: 129  Temp: 98.4  F (36.9  C)  Resp: 29  Weight: 97.1 kg (214 lb 1.1 oz)  SpO2: 90 %      Physical Exam  Vitals signs and nursing note reviewed.   Constitutional:       Appearance: She is obese. She is not ill-appearing or diaphoretic.   HENT:      Head: Normocephalic and atraumatic.      Mouth/Throat:      Mouth: Mucous membranes are moist.   Cardiovascular:      Rate and Rhythm: Tachycardia present. Rhythm irregular.   Pulmonary:      Effort: Tachypnea present. No accessory muscle usage.      Breath sounds: Decreased breath sounds (diffusely) present.   Chest:      Chest wall: No tenderness.   Abdominal:      Palpations: Abdomen is soft.   Musculoskeletal:      Right lower leg: Edema (1+ pitting in foot) present.      Left lower leg: Edema (1+ pitting in foot) present.   Skin:     General: Skin is warm and dry.      Capillary Refill: Capillary refill takes less than 2 seconds.   Neurological:      Mental Status: She is alert and oriented to person, place, and time.      Motor: No weakness.   Psychiatric:         Mood and  Affect: Mood normal. Mood is not anxious.         ED Course        Procedures               EKG Interpretation:      Interpreted by Brett Martins MD  Time reviewed: 0522  Symptoms at time of EKG: dyspnea   Rhythm: atrial fibrillation - rapid  Rate: 118  Axis: Left Axis Deviation  Ectopy: none  Conduction: normal  ST Segments/ T Waves: T wave inversion in aVL  Q Waves: none  Comparison to prior: New onset atrial fibrillation compared to previous EKG 5/29/2019    Clinical Impression: Atrial fibrillation with rapid ventricular response, left axis unchanged from previous           Results for orders placed or performed during the hospital encounter of 05/25/20 (from the past 24 hour(s))   CBC with platelets differential   Result Value Ref Range    WBC 10.3 4.0 - 11.0 10e9/L    RBC Count 5.50 (H) 3.8 - 5.2 10e12/L    Hemoglobin 15.5 11.7 - 15.7 g/dL    Hematocrit 45.1 35.0 - 47.0 %    MCV 82 78 - 100 fl    MCH 28.2 26.5 - 33.0 pg    MCHC 34.4 31.5 - 36.5 g/dL    RDW 11.9 10.0 - 15.0 %    Platelet Count 206 150 - 450 10e9/L    Diff Method Automated Method     % Neutrophils 57.6 %    % Lymphocytes 32.2 %    % Monocytes 8.0 %    % Eosinophils 1.2 %    % Basophils 0.6 %    % Immature Granulocytes 0.4 %    Nucleated RBCs 0 0 /100    Absolute Neutrophil 5.9 1.6 - 8.3 10e9/L    Absolute Lymphocytes 3.3 0.8 - 5.3 10e9/L    Absolute Monocytes 0.8 0.0 - 1.3 10e9/L    Absolute Eosinophils 0.1 0.0 - 0.7 10e9/L    Absolute Basophils 0.1 0.0 - 0.2 10e9/L    Abs Immature Granulocytes 0.0 0 - 0.4 10e9/L    Absolute Nucleated RBC 0.0    Comprehensive metabolic panel   Result Value Ref Range    Sodium 131 (L) 133 - 144 mmol/L    Potassium 4.1 3.4 - 5.3 mmol/L    Chloride 96 94 - 109 mmol/L    Carbon Dioxide 28 20 - 32 mmol/L    Anion Gap 7 3 - 14 mmol/L    Glucose 178 (H) 70 - 99 mg/dL    Urea Nitrogen 14 7 - 30 mg/dL    Creatinine 0.69 0.52 - 1.04 mg/dL    GFR Estimate 75 >60 mL/min/[1.73_m2]    GFR Estimate If Black 87 >60  mL/min/[1.73_m2]    Calcium 8.9 8.5 - 10.1 mg/dL    Bilirubin Total 0.7 0.2 - 1.3 mg/dL    Albumin 3.5 3.4 - 5.0 g/dL    Protein Total 7.3 6.8 - 8.8 g/dL    Alkaline Phosphatase 67 40 - 150 U/L    ALT 31 0 - 50 U/L    AST 23 0 - 45 U/L   Troponin I   Result Value Ref Range    Troponin I ES <0.015 0.000 - 0.045 ug/L   NT pro BNP   Result Value Ref Range    N-Terminal Pro BNP Inpatient 261 0 - 1,800 pg/mL   XR Chest 2 Views    Narrative    EXAM: XR CHEST 2 VW  LOCATION: [facility]  DATE/TIME: 5/25/2020 5:39 AM    INDICATION: Dyspnea.  COMPARISON: 05/22/2012.      Impression    IMPRESSION: Moderate left pleural effusion and probable small right pleural effusion. Cardiomegaly, vascular congestion and perihilar interstitial prominence. Findings suggest CHF.       Medications   nitroGLYcerin (NITROSTAT) sublingual tablet 0.4 mg (0.4 mg Sublingual Given 5/25/20 0555)       5:37 AM; Discussed with daughter by phone. She reports patient has been having anxiety lately with difficulty breathing.      6:13 AM: Patient was signed out at shift change to Dr Rondon pending further reassessment and admission.      Assessments & Plan (with Medical Decision Making)  89-year-old female with history of hypertension, hypothyroidism, type 2 diabetes, and mild dementia presenting for evaluation of difficulty breathing over the past roughly 24 hours.  Patient reports relatively abrupt onset of trouble breathing yesterday morning.  Had trouble throughout the day and overnight tonight.  Was unable to sleep due to difficulty breathing and eventually called her daughter who brought her in for evaluation.  Denies any chest pain, lightheadedness, dizziness, or sweating.  Upon arrival patient with mild increased work of breathing and tachypnea.  Oxygen levels low at 88% on room air upon arrival.  Increased to the low 90s on 2 L by nasal cannula.  Patient is not normally on oxygen.  Given sublingual nitro for symptom relief of  presumed CHF which did provide some subjective for improvement.  X-ray shows some vascular congestion suggestive of CHF however troponin and BNP are normal.     I have reviewed the nursing notes.    I have reviewed the findings, diagnosis, plan and need for follow up with the patient.       New Prescriptions    No medications on file       Final diagnoses:   Dyspnea, unspecified type       5/25/2020   [facility] EMERGENCY DEPARTMENT     Martins, Brett Dave MD  05/25/20 0654

## 2020-05-26 ENCOUNTER — APPOINTMENT (OUTPATIENT)
Dept: CARDIOLOGY | Facility: CLINIC | Age: 85
DRG: 291 | End: 2020-05-26
Attending: PHYSICIAN ASSISTANT
Payer: COMMERCIAL

## 2020-05-26 VITALS
BODY MASS INDEX: 32.84 KG/M2 | SYSTOLIC BLOOD PRESSURE: 134 MMHG | HEIGHT: 66 IN | HEART RATE: 72 BPM | RESPIRATION RATE: 21 BRPM | DIASTOLIC BLOOD PRESSURE: 77 MMHG | TEMPERATURE: 97.8 F | WEIGHT: 204.34 LBS | OXYGEN SATURATION: 90 %

## 2020-05-26 LAB
ANION GAP SERPL CALCULATED.3IONS-SCNC: 5 MMOL/L (ref 3–14)
BACTERIA SPEC CULT: NO GROWTH
BUN SERPL-MCNC: 14 MG/DL (ref 7–30)
CALCIUM SERPL-MCNC: 8.7 MG/DL (ref 8.5–10.1)
CHLORIDE SERPL-SCNC: 97 MMOL/L (ref 94–109)
CO2 SERPL-SCNC: 32 MMOL/L (ref 20–32)
CREAT SERPL-MCNC: 0.69 MG/DL (ref 0.52–1.04)
ERYTHROCYTE [DISTWIDTH] IN BLOOD BY AUTOMATED COUNT: 12 % (ref 10–15)
GFR SERPL CREATININE-BSD FRML MDRD: 75 ML/MIN/{1.73_M2}
GLUCOSE BLDC GLUCOMTR-MCNC: 147 MG/DL (ref 70–99)
GLUCOSE BLDC GLUCOMTR-MCNC: 159 MG/DL (ref 70–99)
GLUCOSE SERPL-MCNC: 132 MG/DL (ref 70–99)
HCT VFR BLD AUTO: 45 % (ref 35–47)
HGB BLD-MCNC: 15.1 G/DL (ref 11.7–15.7)
Lab: NORMAL
MCH RBC QN AUTO: 27.7 PG (ref 26.5–33)
MCHC RBC AUTO-ENTMCNC: 33.6 G/DL (ref 31.5–36.5)
MCV RBC AUTO: 83 FL (ref 78–100)
PLATELET # BLD AUTO: 191 10E9/L (ref 150–450)
POTASSIUM SERPL-SCNC: 3.6 MMOL/L (ref 3.4–5.3)
RBC # BLD AUTO: 5.45 10E12/L (ref 3.8–5.2)
SODIUM SERPL-SCNC: 134 MMOL/L (ref 133–144)
SPECIMEN SOURCE: NORMAL
WBC # BLD AUTO: 7.5 10E9/L (ref 4–11)

## 2020-05-26 PROCEDURE — 36415 COLL VENOUS BLD VENIPUNCTURE: CPT | Performed by: PHYSICIAN ASSISTANT

## 2020-05-26 PROCEDURE — 25000132 ZZH RX MED GY IP 250 OP 250 PS 637: Performed by: PHYSICIAN ASSISTANT

## 2020-05-26 PROCEDURE — 80048 BASIC METABOLIC PNL TOTAL CA: CPT | Performed by: PHYSICIAN ASSISTANT

## 2020-05-26 PROCEDURE — 93306 TTE W/DOPPLER COMPLETE: CPT | Mod: 26 | Performed by: INTERNAL MEDICINE

## 2020-05-26 PROCEDURE — 99239 HOSP IP/OBS DSCHRG MGMT >30: CPT | Performed by: FAMILY MEDICINE

## 2020-05-26 PROCEDURE — 00000146 ZZHCL STATISTIC GLUCOSE BY METER IP

## 2020-05-26 PROCEDURE — 25000131 ZZH RX MED GY IP 250 OP 636 PS 637: Performed by: PHYSICIAN ASSISTANT

## 2020-05-26 PROCEDURE — 25000132 ZZH RX MED GY IP 250 OP 250 PS 637: Performed by: FAMILY MEDICINE

## 2020-05-26 PROCEDURE — 25000128 H RX IP 250 OP 636: Performed by: PHYSICIAN ASSISTANT

## 2020-05-26 PROCEDURE — 85027 COMPLETE CBC AUTOMATED: CPT | Performed by: PHYSICIAN ASSISTANT

## 2020-05-26 PROCEDURE — 25500064 ZZH RX 255 OP 636: Performed by: FAMILY MEDICINE

## 2020-05-26 RX ORDER — DILTIAZEM HYDROCHLORIDE 120 MG/1
120 CAPSULE, COATED, EXTENDED RELEASE ORAL DAILY
Qty: 30 CAPSULE | Refills: 1 | Status: SHIPPED | OUTPATIENT
Start: 2020-05-27 | End: 2020-07-29

## 2020-05-26 RX ORDER — LISINOPRIL 2.5 MG/1
2.5 TABLET ORAL DAILY
Status: DISCONTINUED | OUTPATIENT
Start: 2020-05-27 | End: 2020-05-26

## 2020-05-26 RX ORDER — LISINOPRIL 2.5 MG/1
2.5 TABLET ORAL DAILY
Qty: 30 TABLET | Refills: 1 | Status: SHIPPED | OUTPATIENT
Start: 2020-05-26 | End: 2020-09-29

## 2020-05-26 RX ORDER — LISINOPRIL 5 MG/1
5 TABLET ORAL DAILY
Status: DISCONTINUED | OUTPATIENT
Start: 2020-05-26 | End: 2020-05-26

## 2020-05-26 RX ORDER — LEVOTHYROXINE SODIUM 100 UG/1
100 TABLET ORAL DAILY
Qty: 30 TABLET | Refills: 1 | Status: SHIPPED | OUTPATIENT
Start: 2020-05-27 | End: 2020-07-22

## 2020-05-26 RX ORDER — LISINOPRIL 2.5 MG/1
2.5 TABLET ORAL DAILY
Status: DISCONTINUED | OUTPATIENT
Start: 2020-05-26 | End: 2020-05-26 | Stop reason: HOSPADM

## 2020-05-26 RX ADMIN — LISINOPRIL 2.5 MG: 2.5 TABLET ORAL at 11:39

## 2020-05-26 RX ADMIN — DILTIAZEM HYDROCHLORIDE 120 MG: 120 CAPSULE, COATED, EXTENDED RELEASE ORAL at 08:08

## 2020-05-26 RX ADMIN — HEPARIN SODIUM 5000 UNITS: 5000 INJECTION, SOLUTION INTRAVENOUS; SUBCUTANEOUS at 05:19

## 2020-05-26 RX ADMIN — INSULIN ASPART 1 UNITS: 100 INJECTION, SOLUTION INTRAVENOUS; SUBCUTANEOUS at 11:49

## 2020-05-26 RX ADMIN — LEVOTHYROXINE SODIUM 100 MCG: 100 TABLET ORAL at 08:08

## 2020-05-26 RX ADMIN — INSULIN ASPART 1 UNITS: 100 INJECTION, SOLUTION INTRAVENOUS; SUBCUTANEOUS at 08:07

## 2020-05-26 RX ADMIN — ASPIRIN 81 MG 81 MG: 81 TABLET ORAL at 08:08

## 2020-05-26 RX ADMIN — MELATONIN 25 MCG: at 08:08

## 2020-05-26 RX ADMIN — HUMAN ALBUMIN MICROSPHERES AND PERFLUTREN 2 ML: 10; .22 INJECTION, SOLUTION INTRAVENOUS at 11:29

## 2020-05-26 RX ADMIN — DILTIAZEM HYDROCHLORIDE 30 MG: 30 TABLET, FILM COATED ORAL at 00:09

## 2020-05-26 RX ADMIN — OMEGA-3-ACID ETHYL ESTERS 2 G: 1 CAPSULE, LIQUID FILLED ORAL at 08:12

## 2020-05-26 RX ADMIN — FUROSEMIDE 40 MG: 10 INJECTION, SOLUTION INTRAMUSCULAR; INTRAVENOUS at 08:08

## 2020-05-26 RX ADMIN — HEPARIN SODIUM 5000 UNITS: 5000 INJECTION, SOLUTION INTRAVENOUS; SUBCUTANEOUS at 13:49

## 2020-05-26 RX ADMIN — CALCIUM 500 MG: 500 TABLET ORAL at 08:08

## 2020-05-26 ASSESSMENT — MIFFLIN-ST. JEOR: SCORE: 1340.71

## 2020-05-26 ASSESSMENT — ACTIVITIES OF DAILY LIVING (ADL)
ADLS_ACUITY_SCORE: 16

## 2020-05-26 NOTE — PROGRESS NOTES
[place] [facility] DISCHARGE NOTE    Patient discharged to home at 3:00 PM via wheel chair. Accompanied by daughter and staff. Discharge instructions reviewed with daughter, opportunity offered to ask questions. Prescriptions sent to patients preferred pharmacy. All belongings sent with patient.    Sly Hartman RN

## 2020-05-26 NOTE — PLAN OF CARE
Using call light appropriately, occasionally confused but easily re-oriented. Up to bedside commode w/stand-by assistance. VSS, weaned off nasal cannula this morning. No pain or shortness of breath.   Miracle Bolton RN on 5/26/2020 at 6:01 AM

## 2020-05-26 NOTE — DISCHARGE SUMMARY
[facility] Discharge Summary    Adela Means MRN# [id number]   Age: 89 year old YOB: 1927     Date of Admission:  5/25/2020  Date of Discharge::  5/26/2020  Admitting Physician:  Delvin Ford MD  Discharge Physician:  Delvin Ford MD, MD             Admission Diagnoses:   New onset a-fib (H) [I48.91]  History of hypothyroidism [Z86.39]  Dyspnea, unspecified type [R06.00]          Principle Discharge Diagnosis:       Atrial fibrillation with rapid ventricular response with acute heart failure due to this as below    See hospital course for further active diagnoses addressed during this admission.            Procedures:   See EMR For echo results           Medications Prior to Admission:     Facility-Administered Medications Prior to Admission   Medication Dose Route Frequency Provider Last Rate Last Dose     ropivacaine (NAROPIN) injection 3 mL  3 mL   Juan J Caballero DO   3 mL at 01/07/20 1400     triamcinolone (KENALOG-40) injection 40 mg  40 mg   Juan J Caballero DO   40 mg at 01/07/20 1400     Medications Prior to Admission   Medication Sig Dispense Refill Last Dose     aspirin (ASA) 81 MG tablet Take 1 tablet (81 mg) by mouth daily 90 tablet 3 5/24/2020 at 1400     calcium carbonate (OS-KATHY) 1500 (600 Ca) MG tablet TAKE 1 TABLET BY MOUTH TWICE DAILY WITH MEALS 180 tablet 0 5/24/2020 at 1400     furosemide (LASIX) 20 MG tablet Take 1 tablet by mouth once daily 90 tablet 0 5/24/2020 at 0800     glipiZIDE 5 MG PO 24 hr tablet Take 3 tablets (15 mg) by mouth once daily with breakfast. 270 tablet 1 5/24/2020 at 0800     insulin glargine (LANTUS SOLOSTAR) 100 UNIT/ML pen Take 10 units at 4 pm daily (increase by 2 units every 4 days until am blood sugars are under 150) 15 mL 1 5/24/2020 at 1600     ketoconazole (NIZORAL) 2 % external shampoo APPLY TO AFFECTED AREA(S) AND WASH OFF AFTER 5 MINUTES USE 2 TO 3 TIMES PER WEEK 120 mL 2 Past Week at Unknown time      levothyroxine (EUTHYROX) 112 MCG tablet Take 1 tablet by mouth daily in the morning on an empty stomach, and 1 hour before other medications/food. DUE FOR LABS before next refill 90 tablet 0 2020 at 0700     omega-3 acid ethyl esters (LOVAZA) 1 g capsule Take 2 capsules (2 g) by mouth 2 times daily 120 capsule 11 2020 at 1400     Vitamin D, Cholecalciferol, 25 MCG (1000 UT) TABS Take 1 tablet by mouth once daily 90 tablet 0 2020 at 1400     ACE/ARB NOT PRESCRIBED, INTENTIONAL, Please choose reason not prescribed, below        acetaminophen (TYLENOL) 500 MG tablet Take 1-2 tablets (500-1,000 mg) by mouth every 8 hours as needed for mild pain 30 tablet 0 Unknown at Unknown time     [] blood glucose (CONTOUR NEXT TEST) test strip Use to test blood sugar 1 time daily in the morning before breakfast 100 each 4      blood glucose monitoring (PEREZ MICROLET) lancets Use to test blood sugar 1 times daily 100 each 4      insulin pen needle (BD NAEEM U/F) 32G X 4 MM miscellaneous Use 1 daily as directed. 100 each 4              Discharge Medications:     Current Discharge Medication List      START taking these medications    Details   diltiazem ER COATED BEADS (CARDIZEM CD/CARTIA XT) 120 MG 24 hr capsule Take 1 capsule (120 mg) by mouth daily  Qty: 30 capsule, Refills: 1    Associated Diagnoses: New onset a-fib (H)      lisinopril (ZESTRIL) 2.5 MG tablet Take 1 tablet (2.5 mg) by mouth daily  Qty: 30 tablet, Refills: 1    Associated Diagnoses: Benign essential hypertension      rivaroxaban ANTICOAGULANT (XARELTO ANTICOAGULANT) 20 MG TABS tablet Take 1 tablet (20 mg) by mouth daily (with dinner)  Qty: 30 tablet, Refills: 1    Associated Diagnoses: New onset a-fib (H)         CONTINUE these medications which have CHANGED    Details   levothyroxine (SYNTHROID/LEVOTHROID) 100 MCG tablet Take 1 tablet (100 mcg) by mouth daily  Qty: 30 tablet, Refills: 1    Associated Diagnoses: Hypothyroidism, unspecified  type         CONTINUE these medications which have NOT CHANGED    Details   calcium carbonate (OS-KATHY) 1500 (600 Ca) MG tablet TAKE 1 TABLET BY MOUTH TWICE DAILY WITH MEALS  Qty: 180 tablet, Refills: 0    Associated Diagnoses: Calcium deficiency; Osteopenia, unspecified location      furosemide (LASIX) 20 MG tablet Take 1 tablet by mouth once daily  Qty: 90 tablet, Refills: 0    Associated Diagnoses: Edema, unspecified type      glipiZIDE 5 MG PO 24 hr tablet Take 3 tablets (15 mg) by mouth once daily with breakfast.  Qty: 270 tablet, Refills: 1    Associated Diagnoses: Type 2 diabetes mellitus without complication, without long-term current use of insulin (H)      insulin glargine (LANTUS SOLOSTAR) 100 UNIT/ML pen Take 10 units at 4 pm daily (increase by 2 units every 4 days until am blood sugars are under 150)  Qty: 15 mL, Refills: 1    Comments: Max dose: 20 units  Associated Diagnoses: Type 2 diabetes mellitus with hyperglycemia, without long-term current use of insulin (H)      ketoconazole (NIZORAL) 2 % external shampoo APPLY TO AFFECTED AREA(S) AND WASH OFF AFTER 5 MINUTES USE 2 TO 3 TIMES PER WEEK  Qty: 120 mL, Refills: 2    Associated Diagnoses: Dermatitis, seborrheic      omega-3 acid ethyl esters (LOVAZA) 1 g capsule Take 2 capsules (2 g) by mouth 2 times daily  Qty: 120 capsule, Refills: 11    Associated Diagnoses: Hypertriglyceridemia      Vitamin D, Cholecalciferol, 25 MCG (1000 UT) TABS Take 1 tablet by mouth once daily  Qty: 90 tablet, Refills: 0    Associated Diagnoses: Osteopenia      acetaminophen (TYLENOL) 500 MG tablet Take 1-2 tablets (500-1,000 mg) by mouth every 8 hours as needed for mild pain  Qty: 30 tablet, Refills: 0    Associated Diagnoses: Arthritis of both knees      blood glucose monitoring (PEREZ MICROLET) lancets Use to test blood sugar 1 times daily  Qty: 100 each, Refills: 4    Associated Diagnoses: Type 2 diabetes mellitus without complication, without long-term current use of  insulin (H)      insulin pen needle (BD NAEEM U/F) 32G X 4 MM miscellaneous Use 1 daily as directed.  Qty: 100 each, Refills: 4    Associated Diagnoses: Type 2 diabetes mellitus with hyperglycemia, without long-term current use of insulin (H)         STOP taking these medications       blood glucose (CONTOUR NEXT TEST) test strip Comments:   Reason for Stopping:         ACE/ARB NOT PRESCRIBED, INTENTIONAL, Comments:   Reason for Stopping:         aspirin (ASA) 81 MG tablet Comments:   Reason for Stopping:                       Brief History of Illness:     From Admission H+P:   This patient is a 89 year old  female with a significant past medical history of type 2 diabetes mellitus, hypertension, and hypothyroidism who presents with progressive shortness of breath.     Patient has been having some mild shortness of breath for the past 2-3 days, which became significantly worse yesterday and prevented her from sleep overnight. She decided to go to the emergency department for evaluation very early this morning because she couldn't breathe.     She was short of breath at rest, but dyspnea worsened with any exertion. Reports symptoms of orthopnea. No known weight gain, but states her legs have been getting puffier over the past week (which she attributes to the weather). Denies any associated cough, wheeze, fever, chills, or recent illness associated with her respiratory complaints.      She reports feeling occasional rapid heart rate, but no chest pain.      About 3 weeks ago she was ill with some nausea and vomiting, but has since resolved without recurrence. No associated diarrhea or constipation.      Denies dysuria.      Patient states her legs have been weak lately which she attributes to the poor weather. No falls or syncope, but at times has been lightheaded lately. Intermittent headaches. The remainder review of systems is negative.            TODAY:     Subjective:  Doing well. On room air, no  "dyspnea.  No pain.  Heart rate well-controlled overnight on oral diltiazem.    No other pain.       ROS:   ROS: 10 point ROS neg other than the symptoms noted above in the HPI.   BP (!) 150/91 (BP Location: Left arm)   Pulse 68   Temp 98.4  F (36.9  C) (Axillary)   Resp 16   Ht 1.664 m (5' 5.5\")   Wt 92.7 kg (204 lb 5.5 oz)   SpO2 92%   BMI 33.49 kg/m     EXAM:  General: awake and alert, NAD, oriented x 2, was a bit confused overnight per nursing but per family this is typical, especially with no family around.    Head: normocephalic  Neck: unremarkable, no lymphadenopathy   HEENT: oropharynx pink and moist    Heart: Regular rate and rhythm, no murmurs, rubs, or gallops  Lungs: clear to auscultation bilaterally with good air movement throughout  Abdomen: soft, non-tender, no masses or organomegaly  Extremities: no edema in lower extremities   Skin unremarkable.            Hospital Course:        Atrial fibrillation with rapid ventricular response with acute heart failure due to this as below  5/25/2020 -- appears to be new onset.  Started on diltiazem drip in ER, rate normalized with this. CHADS-VASc2 = 3 - 5 (based on whether you count new CHF and hypertension into calculation). Discussed risk/benefit of anticoagulation, patient was willing to accept risk.   - Continue diltiazem drip, wean as able  - Prn IV diltiazem for sustained tachycardia > 110  - Initiate oral diltiazem (30 mg q 6 h tonight, then start Cardizem  mg in am on 5/26)  - Echo ordered for tomorrow as below  - Placed request that pharmacy check cost for possible discharge on eliquis vs xarelto  - Continue prior to admission aspirin for now until anticoagulation initiated  5/26/2020 -- has done very well on oral diltiazem, heart rate controlled.  Will discharge on diltiazem 120 mg daily.   Regarding anticoagulation, this was discussed with patient yesterday and she would like to proceed with anticoagulation, no copay for either med but " likely will be more compliant with once daily xarelto 20 mg so will discharge on this.  Follow-up with primary care provider in 1 week.       Suspected acute diastolic Heart failure due to atrial fibrillation with rapid ventricular response   5/25/2020 -- clinical picture and chest x-ray consistent with this.  CHF likely due to atrial fibrillation with rapid ventricular response as above, checking echo.  Was given 20 mg IV lasix in the emergency department.  - Continue aspirin until anticoagulation initiated  - Hold home lasix 20 mg daily  - IV lasix 40 twice daily on admission  - Follow daily weights, I&Os.  - Echo to be done 5/26 5/26/2020 -- echo today fairly unremarkable with good LVEF.  Diuresed well with lasix, appears back to baseline, ok to resume home lasix on discharge.   Blood pressure up here but patient typically flexes her arm when it goes off so hard to gauge - started just some very low dose lisinopril at 2.5 mg daily.  Recheck blood pressure and consider BMP at follow-up with primary care provider in 1 week.         Shortness of breath with acute hypoxic respiratory failure  5/25/2020 -- appears due to atrial fibrillation and heart failure as above.  COVID sent from ER but I think this is highly unlikely.  - COVID negative on 5/25, low suspicion, can remove precautions  - Continue supplemental O2 to maintain sats > 92%, wean as able  5/26/2020 -- resolved with diuresis and now on room air.  Continue home lasix as above.       Hypothyroidism  5/25/2020 -- on replacement.  TSH mildly up but so is T4 - in context of atrial fibrillation with rapid ventricular response we'll back off on dosage and start 100 mcg daily tomorrow in place of 112.  5/26/2020 -- will discharge on 100 mcg daily in place of 112.  Recheck thyroid labs with primary care provider in 4-6 weeks.       Hyponatremia  Due to heart failure, resolved with diuresis.       Abnormal UA - asymptomatic bacteruria vs UTI  Admit UA with large  leukocyte esterase and mild pyuria (WBC 14). Afebrile, no leukocytosis.   - No antibiotics initiated, likely asymptomatic bacteruria  - Monitor for signs/symptoms of infection  - Urine culture & blood cultures negative so far, final result pending on discharge.  Follow-up on final culture results with primary care provider.       Benign essential hypertension, BP <140/90  5/25/2020 -- blood pressure mildly up, was given some SL nitroglycerine in the emergency department with improvement.   - Diltiazem drip as above  - Continue lasix as above  5/26/2020 -- blood pressure still mildly up but hard to gauge as patient often doesn't relax arm with checks, started 2.5 mg lisinopril as above.       Type 2 diabetes mellitus with hyperglycemia, without long-term current use of insulin   A1c 8.9. Managed prior to admission with Lantus 10 U daily at 4 pm, and glipizide 15 mg q am.  5/25/2020 -- continue home Lantus, holding glipizide, replaced with medium dose sliding scale insulin while inpatient.  continue home regimen on discharge.       Dementia without behavioral disturbance, unspecified dementia type  Is A&O on admission, seems to follow conversations adequately and capable of making her own decisions.  Was a bit more confused overnight as expected per family but clear again this AM and back to baseline.  Ok for discharge home with family.       Prophylaxis  Subcutaneous Heparin while here, starting xarelto on discharge as above.                 Discharge Instructions and Follow-Up:     Discharge diet: Orders Placed This Encounter      High Consistent CHO Diet       Discharge activity: Activity as tolerated   Discharge follow-up: Follow up with primary care provider in 7 days, reassess blood pressure and heart rate and consider BMP at that time as above.              Discharge Disposition:     Discharged to home      Attestation:  I have reviewed today's vital signs, notes, medications, labs and imaging.  Amount of time  performed on this discharge summary: 50 minutes.    Delvin Ford MD, MD

## 2020-05-26 NOTE — PLAN OF CARE
PT: per discussion with care team during rounds, no skilled PT rehab needs at this time. Patient is discharging back home today.       Yenny Hummel  PT, DPT       5/26/2020   [address] [address]  [email or web address]  Voicemail: [phone]

## 2020-05-26 NOTE — DISCHARGE INSTRUCTIONS
"Xarelto (rivaroxaban) is a blood thinner medication which is used to help prevent blood clots in the body.     You have a condition called atrial fibrillation, which is almost like a \"shaking\" of the heart that can cause changes in your heart rate and your heart rhythm. When the heart is in atrial fibrillation, blood can blood can accumulate in the heart and clot. We are using Xarelto (rivaroxaban) to help prevent blood clots from forming and causing additional problems.     With this medication, you may experience some increased bleeding.    1) Increased bleeding that is normal would include more bruises or gums bleeding when brushing your teeth.   2) Increased bleeding that would require a call to your doctor would be a bruise that gets bigger rather than smaller, blood in your stool or vomiting blood, any cuts that do not stop bleeding.   3) Increased bleeding that would require a visit to the emergency department include any falls, especially if you hit your head.     You are not required to change your diet or come to the clinic for monitoring of lab work. Please monitor for any bleeds and feel free to call your doctor or pharmacist if you have any questions about your blood thinner.     Thank you,   [facility]     "

## 2020-05-26 NOTE — PLAN OF CARE
OT:  per discussion with care team during rounds, no skilled OT needs at this time. Patient is discharging back home today.

## 2020-05-26 NOTE — PROGRESS NOTES
SUBJECTIVE   Adela Means is a  female who presents to clinic today for the following health issue(s):         Hospital Follow-up Visit:    Hospital/Nursing Home/IP Rehab Facility: [facility]  Date of Admission: 05/25/2020  Date of Discharge: 05/26/2020  Reason(s) for Admission:    New onset a-fib (H) [I48.91]  History of hypothyroidism [Z86.39]  Dyspnea, unspecified type [R06.00]    Was your hospitalization related to COVID-19? No   Problems taking medications regularly:  None  Medication changes since discharge: None  Problems adhering to non-medication therapy:  None    Summary of hospitalization:  [facility] discharge summary reviewed  Diagnostic Tests/Treatments reviewed.  Follow up needed: labs  Other Healthcare Providers Involved in Patient s Care:         None   Daughter fills medications daily and helps with meds  Update since discharge: improved.     Post Discharge Medication Reconciliation: discharge medications reconciled, continue medications without change.  Plan of care communicated with patient          Start:   Diltiazem  mg daily  Lisinopril 2. 5 mg   Xarelto 20 mg daily  Continue:  Levothyroxine 110 mcg    [place] (daughter) sets up pills every week    Health Maintenance        Health Maintenance Due   Topic Date Due     EYE EXAM  03/10/1927     DTAP/TDAP/TD IMMUNIZATION (3 - Td) 10/01/2018     PHQ-2  01/01/2020     LIPID  04/09/2020     DIABETIC FOOT EXAM  04/09/2020     MICROALBUMIN  04/10/2020       PCP   Yoselyn Castrejon, [phone]    PROBLEM LIST        Patient Active Problem List   Diagnosis     Acquired hypothyroidism     Benign essential hypertension, BP <140/90     Knee pain     Hepatic cyst     Hypertriglyceridemia, LDL goal <100     Osteopenia     DJD (degenerative joint disease)     Gastritis     Advanced directives, counseling/discussion     Seborrheic dermatitis     Type 2 diabetes mellitus with hyperglycemia, without long-term current use  of insulin (H)     Dementia without behavioral disturbance, unspecified dementia type (H)     Shortness of breath     Atrial fibrillation with rapid ventricular response (H)     Heart failure (H)       MEDICATIONS        Current Outpatient Medications   Medication     order for DME     acetaminophen (TYLENOL) 500 MG tablet     blood glucose monitoring (PEREZ MICROLET) lancets     calcium carbonate (OS-KATHY) 1500 (600 Ca) MG tablet     diltiazem ER COATED BEADS (CARDIZEM CD/CARTIA XT) 120 MG 24 hr capsule     furosemide (LASIX) 20 MG tablet     glipiZIDE 5 MG PO 24 hr tablet     insulin glargine (LANTUS SOLOSTAR) 100 UNIT/ML pen     insulin pen needle (BD NAEEM U/F) 32G X 4 MM miscellaneous     ketoconazole (NIZORAL) 2 % external shampoo     levothyroxine (SYNTHROID/LEVOTHROID) 100 MCG tablet     lisinopril (ZESTRIL) 2.5 MG tablet     omega-3 acid ethyl esters (LOVAZA) 1 g capsule     rivaroxaban ANTICOAGULANT (XARELTO ANTICOAGULANT) 20 MG TABS tablet     Vitamin D, Cholecalciferol, 25 MCG (1000 UT) TABS     No current facility-administered medications for this visit.        Reviewed and updated as needed this visit by Provider:  Tobacco  Allergies  Meds  Med Hx  Surg Hx  Fam Hx  Soc Hx     ROS      Constitutional, neuro, ENT, endocrine, pulmonary, cardiac, gastrointestinal, genitourinary, musculoskeletal, integument and psychiatric systems are negative, except as otherwise noted.    PHYSICAL EXAM   /56   Pulse 72   Temp 99  F (37.2  C) (Tympanic)   Resp 20   Wt 94.8 kg (209 lb)   SpO2 92%   BMI 34.25 kg/m    Body mass index is 34.25 kg/m .  GENERAL APPEARANCE: healthy, alert and no distress  RESP: lungs clear to auscultation - no rales, rhonchi or wheezes  CV: regularly irregular HR, normal S1 S2, no S3 or S4, no murmur, click or rub   MS: extremities normal- no gross deformities noted  SKIN: no suspicious lesions or rashes  PSYCH: mentation appears normal and affect normal/bright    ASSESSMENT &  PLAN     1. Atrial fibrillation with rapid ventricular response (H)  Acute, stable  - Basic metabolic panel  - CARDIOLOGY EVAL ADULT REFERRAL; Future (schedule in 3 months from now  Continue with: ([place] to fill pill container)  Diltiazem  mg daily  Lisinopril 2. 5 mg   Xarelto 20 mg daily    2. Benign essential hypertension, BP <140/90  Chronic, stable  - CARDIOLOGY EVAL ADULT REFERRAL; Future  Get BP cuff- check BP 1 hour after BP medications are given      Patient Education     Discharge Instructions for Atrial Fibrillation  You have been diagnosed with an abnormal heart rhythm called atrial fibrillation. With this condition, your heart s 2 upper chambers quiver rather than squeeze the blood out in a normal pattern. This leads to an irregular and sometimes rapid heartbeat. Some people will develop associated symptoms such as a flip-flopping heartbeat, chest pain, lightheadedness, or shortness of breath. Other people may have no symptoms at all. Atrial fibrillation is serious because it affects the heart s ability to fill with blood as it should. Blood clots may form. This increases the risk for stroke. Untreated atrial fibrillation can also lead to heart failure. Atrial fibrillation can be controlled. With treatment, most people with atrial fibrillation lead normal lives.  Treatment options  Recommended treatment for atrial fibrillation depends on your age, symptoms, how long you have had atrial fibrillation, and other factors. You will have a complete evaluation to find out if you have any abnormalities that caused your heart to go into atrial fibrillation. This might be blocked heart arteries or a thyroid problem. Your doctor will assess your particular case and discuss choices with you.  Treatment choices may include:    Treating an underlying disorder that puts you at risk for atrial fibrillation. For example, correcting an abnormal thyroid or electrolyte problem, or treating a blocked heart  artery.    Restoring a normal heart rhythm with an electrical shock (cardioversion) or with an antiarrhythmic medicine (chemical cardioversion).    Using medicine to control your heart rate in atrial fibrillation.    Preventing the risk for blood clot and stroke using blood-thinning medicines. Your doctor will tell you what he or she recommends. Choices may include aspirin, clopidogrel, warfarin, dabigatran, rivaroxaban, apixaban, and edoxaban.    Doing catheter ablation or a surgical maze procedure. These use different methods to destroy certain areas of heart tissue. This interrupts the electrical signals causing atrial fibrillation. One of these procedures may be a choice when medicines do not work, or as an alternative to long-term medicine.    Other treatment choices may be recommended for you by your doctor.  Managing risk factors for stroke and preventing heart failure are important parts of any treatment plan for atrial fibrillation.  Home care    Take your medicines exactly as directed. Don t skip doses.    Work with your doctor to find the right medicines and doses for you.    Learn to take your own pulse. Keep a record of your results. Ask your doctor which pulse rates mean that you need medical attention. Slowing your pulse is often the goal of treatment. Ask your doctor if it s OK for you to use an automatic machine to check your pulse at home. Sometimes these machines don t count the pulse correctly when you have atrial fibrillation.    Limit your intake of coffee, tea, cola, and other beverages with caffeine. Talk with your doctor about whether you should eliminate caffeine.    Avoid over-the-counter medicines that have caffeine in them.    Let your doctor know what medicines you take, including prescription and over-the-counter medicines, as well as any supplements. They interfere with some medicines given for atrial fibrillation.    Ask your doctor about whether you can drink alcohol. Some people  need to avoid alcohol to better treat atrial fibrillation. If you are taking blood-thinner medicines, alcohol may interfere with them by increasing their effect.    Never take stimulants such as amphetamines or cocaine. These drugs can speed up your heart rate and trigger atrial fibrillation.  Follow-up care  Follow up with your doctor, or as advised.     When should I call my healthcare provider  Call your healthcare provider right away if you have any of the following:    Weakness    Dizziness    Fainting    Fatigue    Shortness of breath    Chest pain with increased activity    A change in the usual regularity of your heartbeat, or an unusually fast heartbeat  Date Last Reviewed: 4/23/2016 2000-2019 [employer]. [address] [place] [address]. All rights reserved. This information is not intended as a substitute for professional medical care. Always follow your healthcare professional's instructions.             Risks, benefits, side effects and rationale for treatment plan fully discussed with the patient and understanding expressed.  Yoselyn Castrejon, EVELIA-BC  [facility]

## 2020-05-27 ENCOUNTER — OFFICE VISIT (OUTPATIENT)
Dept: FAMILY MEDICINE | Facility: CLINIC | Age: 85
End: 2020-05-27
Payer: COMMERCIAL

## 2020-05-27 ENCOUNTER — TELEPHONE (OUTPATIENT)
Dept: FAMILY MEDICINE | Facility: CLINIC | Age: 85
End: 2020-05-27

## 2020-05-27 VITALS
HEART RATE: 72 BPM | WEIGHT: 209 LBS | TEMPERATURE: 99 F | OXYGEN SATURATION: 92 % | SYSTOLIC BLOOD PRESSURE: 102 MMHG | BODY MASS INDEX: 34.25 KG/M2 | RESPIRATION RATE: 20 BRPM | DIASTOLIC BLOOD PRESSURE: 56 MMHG

## 2020-05-27 DIAGNOSIS — I10 BENIGN ESSENTIAL HYPERTENSION: Chronic | ICD-10-CM

## 2020-05-27 DIAGNOSIS — E78.1 HYPERTRIGLYCERIDEMIA: Chronic | ICD-10-CM

## 2020-05-27 DIAGNOSIS — I48.91 ATRIAL FIBRILLATION WITH RAPID VENTRICULAR RESPONSE (H): Primary | Chronic | ICD-10-CM

## 2020-05-27 PROBLEM — E11.65 TYPE 2 DIABETES MELLITUS WITH HYPERGLYCEMIA, WITHOUT LONG-TERM CURRENT USE OF INSULIN (H): Chronic | Status: ACTIVE | Noted: 2017-03-13

## 2020-05-27 LAB
ANION GAP SERPL CALCULATED.3IONS-SCNC: 6 MMOL/L (ref 3–14)
BUN SERPL-MCNC: 20 MG/DL (ref 7–30)
CALCIUM SERPL-MCNC: 8.7 MG/DL (ref 8.5–10.1)
CHLORIDE SERPL-SCNC: 95 MMOL/L (ref 94–109)
CO2 SERPL-SCNC: 28 MMOL/L (ref 20–32)
CREAT SERPL-MCNC: 0.89 MG/DL (ref 0.52–1.04)
GFR SERPL CREATININE-BSD FRML MDRD: 56 ML/MIN/{1.73_M2}
GLUCOSE SERPL-MCNC: 165 MG/DL (ref 70–99)
POTASSIUM SERPL-SCNC: 4.2 MMOL/L (ref 3.4–5.3)
SODIUM SERPL-SCNC: 129 MMOL/L (ref 133–144)

## 2020-05-27 PROCEDURE — 36415 COLL VENOUS BLD VENIPUNCTURE: CPT | Performed by: NURSE PRACTITIONER

## 2020-05-27 PROCEDURE — 80048 BASIC METABOLIC PNL TOTAL CA: CPT | Performed by: NURSE PRACTITIONER

## 2020-05-27 PROCEDURE — 99214 OFFICE O/P EST MOD 30 MIN: CPT | Performed by: NURSE PRACTITIONER

## 2020-05-27 NOTE — LETTER
May 29, 2020      Adela Means  [address] [facility] [place] [address]        Dear ,    We are writing to inform you of your test results.    BMP- stable, chronic low sodium, had IVF in hospital. As before, recommend fluid restriction to no more than 1 Liter/day. We'll keep an eye on this.     Resulted Orders   Basic metabolic panel   Result Value Ref Range    Sodium 129 (L) 133 - 144 mmol/L    Potassium 4.2 3.4 - 5.3 mmol/L    Chloride 95 94 - 109 mmol/L    Carbon Dioxide 28 20 - 32 mmol/L    Anion Gap 6 3 - 14 mmol/L    Glucose 165 (H) 70 - 99 mg/dL      Comment:      Non Fasting    Urea Nitrogen 20 7 - 30 mg/dL    Creatinine 0.89 0.52 - 1.04 mg/dL    GFR Estimate 56 (L) >60 mL/min/[1.73_m2]      Comment:      Non  GFR Calc  Starting 12/18/2018, serum creatinine based estimated GFR (eGFR) will be   calculated using the Chronic Kidney Disease Epidemiology Collaboration   (CKD-EPI) equation.      GFR Estimate If Black 65 >60 mL/min/[1.73_m2]      Comment:       GFR Calc  Starting 12/18/2018, serum creatinine based estimated GFR (eGFR) will be   calculated using the Chronic Kidney Disease Epidemiology Collaboration   (CKD-EPI) equation.      Calcium 8.7 8.5 - 10.1 mg/dL       If you have any questions or concerns, please call the clinic at the number listed above.       Sincerely,        Yoselyn Castrejon, HUGH/mackenzie

## 2020-05-27 NOTE — PATIENT INSTRUCTIONS
1. Atrial fibrillation with rapid ventricular response (H)  Acute, stable  - Basic metabolic panel  - CARDIOLOGY EVAL ADULT REFERRAL; Future (schedule in 3 months from now  Continue with: ([place] to fill pill container)  Diltiazem  mg daily  Lisinopril 2. 5 mg   Xarelto 20 mg daily    2. Benign essential hypertension, BP <140/90  Chronic, stable  - CARDIOLOGY EVAL ADULT REFERRAL; Future  Get BP cuff- check BP 1 hour after BP medications are given      Patient Education     Discharge Instructions for Atrial Fibrillation  You have been diagnosed with an abnormal heart rhythm called atrial fibrillation. With this condition, your heart s 2 upper chambers quiver rather than squeeze the blood out in a normal pattern. This leads to an irregular and sometimes rapid heartbeat. Some people will develop associated symptoms such as a flip-flopping heartbeat, chest pain, lightheadedness, or shortness of breath. Other people may have no symptoms at all. Atrial fibrillation is serious because it affects the heart s ability to fill with blood as it should. Blood clots may form. This increases the risk for stroke. Untreated atrial fibrillation can also lead to heart failure. Atrial fibrillation can be controlled. With treatment, most people with atrial fibrillation lead normal lives.  Treatment options  Recommended treatment for atrial fibrillation depends on your age, symptoms, how long you have had atrial fibrillation, and other factors. You will have a complete evaluation to find out if you have any abnormalities that caused your heart to go into atrial fibrillation. This might be blocked heart arteries or a thyroid problem. Your doctor will assess your particular case and discuss choices with you.  Treatment choices may include:    Treating an underlying disorder that puts you at risk for atrial fibrillation. For example, correcting an abnormal thyroid or electrolyte problem, or treating a blocked heart  artery.    Restoring a normal heart rhythm with an electrical shock (cardioversion) or with an antiarrhythmic medicine (chemical cardioversion).    Using medicine to control your heart rate in atrial fibrillation.    Preventing the risk for blood clot and stroke using blood-thinning medicines. Your doctor will tell you what he or she recommends. Choices may include aspirin, clopidogrel, warfarin, dabigatran, rivaroxaban, apixaban, and edoxaban.    Doing catheter ablation or a surgical maze procedure. These use different methods to destroy certain areas of heart tissue. This interrupts the electrical signals causing atrial fibrillation. One of these procedures may be a choice when medicines do not work, or as an alternative to long-term medicine.    Other treatment choices may be recommended for you by your doctor.  Managing risk factors for stroke and preventing heart failure are important parts of any treatment plan for atrial fibrillation.  Home care    Take your medicines exactly as directed. Don t skip doses.    Work with your doctor to find the right medicines and doses for you.    Learn to take your own pulse. Keep a record of your results. Ask your doctor which pulse rates mean that you need medical attention. Slowing your pulse is often the goal of treatment. Ask your doctor if it s OK for you to use an automatic machine to check your pulse at home. Sometimes these machines don t count the pulse correctly when you have atrial fibrillation.    Limit your intake of coffee, tea, cola, and other beverages with caffeine. Talk with your doctor about whether you should eliminate caffeine.    Avoid over-the-counter medicines that have caffeine in them.    Let your doctor know what medicines you take, including prescription and over-the-counter medicines, as well as any supplements. They interfere with some medicines given for atrial fibrillation.    Ask your doctor about whether you can drink alcohol. Some people  need to avoid alcohol to better treat atrial fibrillation. If you are taking blood-thinner medicines, alcohol may interfere with them by increasing their effect.    Never take stimulants such as amphetamines or cocaine. These drugs can speed up your heart rate and trigger atrial fibrillation.  Follow-up care  Follow up with your doctor, or as advised.     When should I call my healthcare provider  Call your healthcare provider right away if you have any of the following:    Weakness    Dizziness    Fainting    Fatigue    Shortness of breath    Chest pain with increased activity    A change in the usual regularity of your heartbeat, or an unusually fast heartbeat  Date Last Reviewed: 4/23/2016 2000-2019 [employer]. [address] [place] [address], [place] [address]. All rights reserved. This information is not intended as a substitute for professional medical care. Always follow your healthcare professional's instructions.

## 2020-05-27 NOTE — TELEPHONE ENCOUNTER
ED/UC/IP follow up phone call: Dyspnea - 05/26/20    RN please call to follow up.    Number of ED visits in past 12 months = 0

## 2020-05-29 NOTE — RESULT ENCOUNTER NOTE
Please call her with these results.   Send a hard copy for their records.  BMP- stable, chronic low sodium, had IVF in hospital. As before, recommend fluid restriction to no more than 1 Liter/day. We'll keep an eye on this.   Thanks.   EVELIA Buckley

## 2020-05-30 DIAGNOSIS — E78.1 HYPERTRIGLYCERIDEMIA: Chronic | ICD-10-CM

## 2020-05-31 LAB
BACTERIA SPEC CULT: NO GROWTH
SPECIMEN SOURCE: NORMAL

## 2020-06-01 RX ORDER — OMEGA-3-ACID ETHYL ESTERS 1 G/1
CAPSULE, LIQUID FILLED ORAL
Qty: 120 CAPSULE | Refills: 0 | Status: SHIPPED | OUTPATIENT
Start: 2020-06-01 | End: 2020-07-10

## 2020-06-07 DIAGNOSIS — E11.9 TYPE 2 DIABETES MELLITUS WITHOUT COMPLICATION, WITHOUT LONG-TERM CURRENT USE OF INSULIN (H): Chronic | ICD-10-CM

## 2020-06-09 NOTE — TELEPHONE ENCOUNTER
Prescription approved per [facility] Refill Protocol.  Previous Rx just .  Last diabetic OV 2020: How often are you checking your blood sugar? One time daily    Avani PRIEST RN, BSN

## 2020-06-15 DIAGNOSIS — L21.9 DERMATITIS, SEBORRHEIC: ICD-10-CM

## 2020-06-15 RX ORDER — KETOCONAZOLE 20 MG/ML
SHAMPOO TOPICAL
Qty: 240 ML | Refills: 3 | Status: SHIPPED | OUTPATIENT
Start: 2020-06-15

## 2020-07-13 ENCOUNTER — TELEPHONE (OUTPATIENT)
Dept: FAMILY MEDICINE | Facility: CLINIC | Age: 85
End: 2020-07-13

## 2020-07-13 NOTE — TELEPHONE ENCOUNTER
[facility] Medication Refill Form signed and faxed to: [phone] and sent to scanning.    Barbara Guadalupe  [facility]

## 2020-07-20 DIAGNOSIS — I48.91 NEW ONSET A-FIB (H): ICD-10-CM

## 2020-07-20 NOTE — TELEPHONE ENCOUNTER
Requested Prescriptions   Pending Prescriptions Disp Refills     rivaroxaban ANTICOAGULANT (XARELTO ANTICOAGULANT) 20 MG TABS tablet 30 tablet 1     Sig: Take 1 tablet (20 mg) by mouth daily (with dinner)       Direct Oral Anticoagulant Agents Failed - 7/20/2020 10:27 AM        Failed - Creatinine Clearance greater than 50 ml/min on file in past 3 mos     No lab results found.          Passed - Normal Platelets on file in past 12 months     Recent Labs   Lab Test 05/26/20  0513                  Passed - Medication is active on med list        Passed - Serum creatinine less than or equal to 1.4 on file in past 3 mos     Recent Labs   Lab Test 05/27/20  1437   CR 0.89       Ok to refill medication if creatinine is low          Passed - Patient is 18 years of age or older        Passed - No active pregnancy on record        Passed - No positive pregnancy test within past 12 months        Passed - Recent (6 mo) or future (30 days) visit within the authorizing provider's specialty           Routing refill request to provider for review/approval because:  No creatinine clearance on file

## 2020-07-22 DIAGNOSIS — E03.9 HYPOTHYROIDISM, UNSPECIFIED TYPE: ICD-10-CM

## 2020-07-22 RX ORDER — LEVOTHYROXINE SODIUM 100 UG/1
100 TABLET ORAL DAILY
Qty: 90 TABLET | Refills: 1 | Status: SHIPPED | OUTPATIENT
Start: 2020-07-22 | End: 2021-01-19

## 2020-07-24 DIAGNOSIS — E11.65 TYPE 2 DIABETES MELLITUS WITH HYPERGLYCEMIA, WITHOUT LONG-TERM CURRENT USE OF INSULIN (H): ICD-10-CM

## 2020-07-29 ENCOUNTER — TELEPHONE (OUTPATIENT)
Dept: FAMILY MEDICINE | Facility: CLINIC | Age: 85
End: 2020-07-29

## 2020-07-29 DIAGNOSIS — I48.91 NEW ONSET A-FIB (H): ICD-10-CM

## 2020-07-29 DIAGNOSIS — R60.9 EDEMA, UNSPECIFIED TYPE: ICD-10-CM

## 2020-07-29 RX ORDER — DILTIAZEM HYDROCHLORIDE 120 MG/1
120 CAPSULE, COATED, EXTENDED RELEASE ORAL DAILY
Qty: 90 CAPSULE | Refills: 1 | Status: SHIPPED | OUTPATIENT
Start: 2020-07-29 | End: 2021-01-26

## 2020-07-30 RX ORDER — FUROSEMIDE 20 MG
TABLET ORAL
Qty: 90 TABLET | Refills: 0 | Status: SHIPPED | OUTPATIENT
Start: 2020-07-30 | End: 2020-09-29

## 2020-08-21 DIAGNOSIS — M85.80 OSTEOPENIA: ICD-10-CM

## 2020-08-21 RX ORDER — MULTIVIT-MIN/IRON/FOLIC ACID/K 18-600-40
CAPSULE ORAL
Qty: 90 TABLET | Refills: 0 | Status: SHIPPED | OUTPATIENT
Start: 2020-08-21 | End: 2020-11-19

## 2020-08-21 NOTE — TELEPHONE ENCOUNTER
"Prescription approved per [facility] Refill Protocol.    Requested Prescriptions   Pending Prescriptions Disp Refills     Vitamin D (Cholecalciferol) 25 MCG (1000 UT) TABS [Pharmacy Med Name: Vitamin D (Cholecalciferol) 25 MCG (1000 UT) Oral Tablet] 30 tablet 0     Sig: Take 1 tablet by mouth once daily       Vitamin Supplements (Adult) Protocol Passed - 8/21/2020  3:52 PM        Passed - High dose Vitamin D not ordered        Passed - Recent (12 mo) or future (30 days) visit within the authorizing provider's specialty     Patient has had an office visit with the authorizing provider or a provider within the authorizing providers department within the previous 12 mos or has a future within next 30 days. See \"Patient Info\" tab in inbasket, or \"Choose Columns\" in Meds & Orders section of the refill encounter.              Passed - Medication is active on med list           Avani PRIEST RN, BSN      "

## 2020-08-25 DIAGNOSIS — E11.9 TYPE 2 DIABETES MELLITUS WITHOUT COMPLICATION, WITHOUT LONG-TERM CURRENT USE OF INSULIN (H): Chronic | ICD-10-CM

## 2020-09-29 ENCOUNTER — OFFICE VISIT (OUTPATIENT)
Dept: FAMILY MEDICINE | Facility: CLINIC | Age: 85
End: 2020-09-29
Payer: COMMERCIAL

## 2020-09-29 VITALS
BODY MASS INDEX: 33.27 KG/M2 | HEART RATE: 74 BPM | WEIGHT: 203 LBS | SYSTOLIC BLOOD PRESSURE: 110 MMHG | OXYGEN SATURATION: 95 % | TEMPERATURE: 97.3 F | DIASTOLIC BLOOD PRESSURE: 80 MMHG | RESPIRATION RATE: 12 BRPM

## 2020-09-29 DIAGNOSIS — E11.65 TYPE 2 DIABETES MELLITUS WITH HYPERGLYCEMIA, WITHOUT LONG-TERM CURRENT USE OF INSULIN (H): Primary | Chronic | ICD-10-CM

## 2020-09-29 DIAGNOSIS — I48.91 ATRIAL FIBRILLATION WITH RAPID VENTRICULAR RESPONSE (H): ICD-10-CM

## 2020-09-29 DIAGNOSIS — Z23 NEED FOR PROPHYLACTIC VACCINATION AND INOCULATION AGAINST INFLUENZA: ICD-10-CM

## 2020-09-29 DIAGNOSIS — I10 BENIGN ESSENTIAL HYPERTENSION: ICD-10-CM

## 2020-09-29 DIAGNOSIS — E03.9 HYPOTHYROIDISM, UNSPECIFIED TYPE: ICD-10-CM

## 2020-09-29 DIAGNOSIS — R60.9 EDEMA, UNSPECIFIED TYPE: ICD-10-CM

## 2020-09-29 LAB
ANION GAP SERPL CALCULATED.3IONS-SCNC: 7 MMOL/L (ref 3–14)
BUN SERPL-MCNC: 24 MG/DL (ref 7–30)
CALCIUM SERPL-MCNC: 9 MG/DL (ref 8.5–10.1)
CHLORIDE SERPL-SCNC: 101 MMOL/L (ref 94–109)
CHOLEST SERPL-MCNC: 147 MG/DL
CO2 SERPL-SCNC: 25 MMOL/L (ref 20–32)
CREAT SERPL-MCNC: 0.94 MG/DL (ref 0.52–1.04)
CREAT UR-MCNC: 71 MG/DL
GFR SERPL CREATININE-BSD FRML MDRD: 52 ML/MIN/{1.73_M2}
GLUCOSE SERPL-MCNC: 269 MG/DL (ref 70–99)
HBA1C MFR BLD: 8.6 % (ref 0–5.6)
HDLC SERPL-MCNC: 40 MG/DL
LDLC SERPL CALC-MCNC: 71 MG/DL
MICROALBUMIN UR-MCNC: 28 MG/L
MICROALBUMIN/CREAT UR: 39.21 MG/G CR (ref 0–25)
NONHDLC SERPL-MCNC: 107 MG/DL
POTASSIUM SERPL-SCNC: 4.9 MMOL/L (ref 3.4–5.3)
SODIUM SERPL-SCNC: 133 MMOL/L (ref 133–144)
T4 FREE SERPL-MCNC: 1.47 NG/DL (ref 0.76–1.46)
TRIGL SERPL-MCNC: 182 MG/DL
TSH SERPL DL<=0.005 MIU/L-ACNC: 5.69 MU/L (ref 0.4–4)

## 2020-09-29 PROCEDURE — 80048 BASIC METABOLIC PNL TOTAL CA: CPT | Performed by: NURSE PRACTITIONER

## 2020-09-29 PROCEDURE — 83036 HEMOGLOBIN GLYCOSYLATED A1C: CPT | Performed by: NURSE PRACTITIONER

## 2020-09-29 PROCEDURE — 99214 OFFICE O/P EST MOD 30 MIN: CPT | Mod: 25 | Performed by: NURSE PRACTITIONER

## 2020-09-29 PROCEDURE — 82043 UR ALBUMIN QUANTITATIVE: CPT | Performed by: NURSE PRACTITIONER

## 2020-09-29 PROCEDURE — 84439 ASSAY OF FREE THYROXINE: CPT | Performed by: NURSE PRACTITIONER

## 2020-09-29 PROCEDURE — 90662 IIV NO PRSV INCREASED AG IM: CPT | Performed by: NURSE PRACTITIONER

## 2020-09-29 PROCEDURE — 36415 COLL VENOUS BLD VENIPUNCTURE: CPT | Performed by: NURSE PRACTITIONER

## 2020-09-29 PROCEDURE — 84443 ASSAY THYROID STIM HORMONE: CPT | Performed by: NURSE PRACTITIONER

## 2020-09-29 PROCEDURE — 80061 LIPID PANEL: CPT | Performed by: NURSE PRACTITIONER

## 2020-09-29 PROCEDURE — G0008 ADMIN INFLUENZA VIRUS VAC: HCPCS | Performed by: NURSE PRACTITIONER

## 2020-09-29 RX ORDER — GLIPIZIDE 5 MG/1
TABLET, FILM COATED, EXTENDED RELEASE ORAL
Qty: 270 TABLET | Refills: 1 | Status: SHIPPED | OUTPATIENT
Start: 2020-09-29 | End: 2022-08-23

## 2020-09-29 RX ORDER — FUROSEMIDE 20 MG
20 TABLET ORAL DAILY
Qty: 90 TABLET | Refills: 1 | Status: SHIPPED | OUTPATIENT
Start: 2020-09-29 | End: 2021-04-26

## 2020-09-29 ASSESSMENT — PAIN SCALES - GENERAL: PAINLEVEL: NO PAIN (0)

## 2020-09-29 NOTE — LETTER
September 30, 2020      Adela Means  [address] [facility] [address]        Dear ,    We are writing to inform you of your test results.    Your test results fall within the expected range(s) or remain unchanged from previous results.  Please continue with current treatment plan and follow up in 6 months.    Resulted Orders   Lipid panel reflex to direct LDL Fasting   Result Value Ref Range    Cholesterol 147 <200 mg/dL    Triglycerides 182 (H) <150 mg/dL      Comment:      Borderline high:  150-199 mg/dl  High:             200-499 mg/dl  Very high:       >499 mg/dl  Non Fasting      HDL Cholesterol 40 (L) >49 mg/dL    LDL Cholesterol Calculated 71 <100 mg/dL      Comment:      Desirable:       <100 mg/dl    Non HDL Cholesterol 107 <130 mg/dL   Albumin Random Urine Quantitative with Creat Ratio   Result Value Ref Range    Creatinine Urine 71 mg/dL    Albumin Urine mg/L 28 mg/L    Albumin Urine mg/g Cr 39.21 (H) 0 - 25 mg/g Cr   Hemoglobin A1c   Result Value Ref Range    Hemoglobin A1C 8.6 (H) 0 - 5.6 %      Comment:      Normal <5.7% Prediabetes 5.7-6.4%  Diabetes 6.5% or higher - adopted from ADA   consensus guidelines.     TSH with free T4 reflex   Result Value Ref Range    TSH 5.69 (H) 0.40 - 4.00 mU/L   Basic metabolic panel  (Ca, Cl, CO2, Creat, Gluc, K, Na, BUN)   Result Value Ref Range    Sodium 133 133 - 144 mmol/L    Potassium 4.9 3.4 - 5.3 mmol/L    Chloride 101 94 - 109 mmol/L    Carbon Dioxide 25 20 - 32 mmol/L    Anion Gap 7 3 - 14 mmol/L    Glucose 269 (H) 70 - 99 mg/dL      Comment:      Non Fasting    Urea Nitrogen 24 7 - 30 mg/dL    Creatinine 0.94 0.52 - 1.04 mg/dL    GFR Estimate 52 (L) >60 mL/min/[1.73_m2]      Comment:      Non  GFR Calc  Starting 12/18/2018, serum creatinine based estimated GFR (eGFR) will be   calculated using the Chronic Kidney Disease Epidemiology Collaboration   (CKD-EPI) equation.      GFR Estimate If Black 61 >60 mL/min/[1.73_m2]       Comment:       GFR Calc  Starting 12/18/2018, serum creatinine based estimated GFR (eGFR) will be   calculated using the Chronic Kidney Disease Epidemiology Collaboration   (CKD-EPI) equation.      Calcium 9.0 8.5 - 10.1 mg/dL   T4 free   Result Value Ref Range    T4 Free 1.47 (H) 0.76 - 1.46 ng/dL       If you have any questions or concerns, please call the clinic at the number listed above.       Sincerely,    Sofia Ascencio, NP/ ss

## 2020-09-29 NOTE — TELEPHONE ENCOUNTER
Received fax from [facility] in [place] that 'Or As Directed' not allowed for Medicare (regarding test strips). Resent.    Avani PRIEST, RN, BSN

## 2020-09-29 NOTE — PROGRESS NOTES
SUBJECTIVE   Adela Means is a 89 year old female who presents with     New Patient/Transfer of Care  Diabetes Follow-up    How often are you checking your blood sugar? One time daily  What time of day are you checking your blood sugars (select all that apply)?  Before meals  Have you had any blood sugars above 200?  Yes [phone]   Have you had any blood sugars below 70?  No    What symptoms do you notice when your blood sugar is low?  Shaky, Dizzy and Weak  -BG never goes low    What concerns do you have today about your diabetes? None     Do you have any of these symptoms? (Select all that apply)  No numbness or tingling in feet.  No redness, sores or blisters on feet.  No complaints of excessive thirst.  No reports of blurry vision.  No significant changes to weight.    Have you had a diabetic eye exam in the last 12 months? No        BP Readings from Last 2 Encounters:   09/29/20 110/80   05/27/20 102/56     Hemoglobin A1C (%)   Date Value   05/25/2020 8.9 (H)   01/03/2020 9.1 (H)     LDL Cholesterol Calculated (mg/dL)   Date Value   04/09/2019 89   03/13/2018 73           Hypertension Follow-up      Do you check your blood pressure regularly outside of the clinic? Yes Stopped Lisinopril due to cough    Are you following a low salt diet? Yes    Are your blood pressures ever more than 140 on the top number (systolic) OR more   than 90 on the bottom number (diastolic), for example 140/90? Yes 127-134/?        PCP   Sofia Ascencio [phone]    Health Maintenance        Health Maintenance Due   Topic Date Due     EYE EXAM  03/10/1927     HEPATITIS B IMMUNIZATION (1 of 3 - Risk 3-dose series) [birth year]10[birth year]1946     DTAP/TDAP/TD IMMUNIZATION (3 - Td) 10/01/2018     PHQ-2  01/01/2020     LIPID  04/09/2020     DIABETIC FOOT EXAM  04/09/2020     MICROALBUMIN  04/10/2020     INFLUENZA VACCINE (1) 09/01/2020     FALL RISK ASSESSMENT  09/18/2020     MEDICARE ANNUAL WELLNESS VISIT  09/18/2020       HPI        Patient  Active Problem List   Diagnosis     Acquired hypothyroidism     Benign essential hypertension, BP <140/90     Knee pain     Hepatic cyst     Hypertriglyceridemia, LDL goal <100     Osteopenia     DJD (degenerative joint disease)     Gastritis     Advanced directives, counseling/discussion     Seborrheic dermatitis     Type 2 diabetes mellitus with hyperglycemia, without long-term current use of insulin (H)     Dementia without behavioral disturbance, unspecified dementia type (H)     Shortness of breath     Atrial fibrillation with rapid ventricular response (H)     Heart failure (H)     Current Outpatient Medications   Medication     acetaminophen (TYLENOL) 500 MG tablet     blood glucose monitoring (PEREZ MICROLET) lancets     calcium carbonate (OS-KATHY) 1500 (600 Ca) MG tablet     CONTOUR NEXT TEST test strip     diltiazem ER COATED BEADS (CARDIZEM CD/CARTIA XT) 120 MG 24 hr capsule     furosemide (LASIX) 20 MG tablet     glipiZIDE 5 MG PO 24 hr tablet     insulin glargine (LANTUS SOLOSTAR) 100 UNIT/ML pen     insulin pen needle (BD NAEEM U/F) 32G X 4 MM miscellaneous     ketoconazole (NIZORAL) 2 % external shampoo     levothyroxine (SYNTHROID/LEVOTHROID) 100 MCG tablet     order for DME     rivaroxaban ANTICOAGULANT (XARELTO ANTICOAGULANT) 20 MG TABS tablet     Vitamin D, Cholecalciferol, 25 MCG (1000 UT) TABS     lisinopril (ZESTRIL) 2.5 MG tablet     No current facility-administered medications for this visit.        Patient Active Problem List   Diagnosis     Acquired hypothyroidism     Benign essential hypertension, BP <140/90     Knee pain     Hepatic cyst     Hypertriglyceridemia, LDL goal <100     Osteopenia     DJD (degenerative joint disease)     Gastritis     Advanced directives, counseling/discussion     Seborrheic dermatitis     Type 2 diabetes mellitus with hyperglycemia, without long-term current use of insulin (H)     Dementia without behavioral disturbance, unspecified dementia type (H)      Shortness of breath     Atrial fibrillation with rapid ventricular response (H)     Heart failure (H)     Past Surgical History:   Procedure Laterality Date     APPENDECTOMY  age 18     CHOLECYSTECTOMY, OPEN       HYSTERECTOMY, VAGINAL         Social History     Tobacco Use     Smoking status: Never Smoker     Smokeless tobacco: Never Used   Substance Use Topics     Alcohol use: Not Currently     Family History   Problem Relation Age of Onset     No Known Problems Daughter      No Known Problems Daughter      No Known Problems Daughter            Reviewed and updated:  Tobacco  Allergies  Meds  Med Hx  Surg Hx  Fam Hx  Soc Hx     ROS:  Constitutional, HEENT, cardiovascular, pulmonary, gi and gu systems are negative, except as otherwise noted.    PHYSICAL EXAM   /80   Pulse 74   Temp 97.3  F (36.3  C) (Tympanic)   Resp 12   Wt 92.1 kg (203 lb)   SpO2 95%   BMI 33.27 kg/m    Body mass index is 33.27 kg/m .   Here with daughter Georgia     GENERAL: healthy, alert and no distress  HENT: ear canals and TM's normal, nose and mouth without ulcers or lesions, left ear impacted with cerumen irrigated by CMA   NECK: no adenopathy, no asymmetry, masses, or scars and thyroid normal to palpation  RESP: lungs clear to auscultation - no rales, rhonchi or wheezes  CV: regular rate and rhythm, normal S1 S2, no S3 or S4, no murmur, click or rub, trace peripheral edema  ABDOMEN: soft, nontender, no hepatosplenomegaly, no masses and bowel sounds normal  MS: no gross musculoskeletal defects noted, no edema    Assessment & Plan     Type 2 diabetes mellitus with hyperglycemia, without long-term current use of insulin (H)  Labs today   Refilled lantus   - Lipid panel reflex to direct LDL Fasting  - Albumin Random Urine Quantitative with Creat Ratio  - Hemoglobin A1c  - TSH with free T4 reflex  - Basic metabolic panel  (Ca, Cl, CO2, Creat, Gluc, K, Na, BUN)  - insulin glargine (LANTUS SOLOSTAR) 100 UNIT/ML pen  Dispense: 9  mL; Refill: 1    Edema, unspecified type  Stable   - furosemide (LASIX) 20 MG tablet  Dispense: 90 tablet; Refill: 1    Hypothyroidism, unspecified type  TSH checked today     Atrial fibrillation with rapid ventricular response (H)  On diltiazem   xarelto for anticoagulation     Need for prophylactic vaccination and inoculation against influenza  - FLUZONE HIGH DOSE 65+  [52993]  - ADMIN INFLUENZA (For MEDICARE Patients ONLY) []    Benign essential hypertension, BP <140/90  Stable          Patient Instructions   Medications refilled   Labs today   Flu shot given       Return in about 6 months (around 3/29/2021) for Routine Visit, Lab Work, med refills.    Sofia Ascencio NP  [facility]      Risks, benefits, side effects and rationale for treatment plan fully discussed with the patient and understanding expressed.

## 2020-09-29 NOTE — NURSING NOTE
"Chief Complaint   Patient presents with     Imm/Inj     Flu Shot     Establish Care     /80   Pulse 74   Temp 97.3  F (36.3  C) (Tympanic)   Resp 12   Wt 92.1 kg (203 lb)   SpO2 95%   BMI 33.27 kg/m   Estimated body mass index is 33.27 kg/m  as calculated from the following:    Height as of 5/25/20: 1.664 m (5' 5.5\").    Weight as of this encounter: 92.1 kg (203 lb).  Patient presents to the clinic using [facility]      Health Maintenance that is potentially due pending provider review:    Health Maintenance Due   Topic Date Due     EYE EXAM  03/10/1927     HEPATITIS B IMMUNIZATION (1 of 3 - Risk 3-dose series) 03/10/1946     DTAP/TDAP/TD IMMUNIZATION (3 - Td) 10/01/2018     PHQ-2  01/01/2020     LIPID  04/09/2020     DIABETIC FOOT EXAM  04/09/2020     MICROALBUMIN  04/10/2020     INFLUENZA VACCINE (1) 09/01/2020     FALL RISK ASSESSMENT  09/18/2020     MEDICARE ANNUAL WELLNESS VISIT  09/18/2020        Possibly completing today per provider review.        "

## 2020-11-13 DIAGNOSIS — E58 CALCIUM DEFICIENCY: ICD-10-CM

## 2020-11-13 DIAGNOSIS — M85.80 OSTEOPENIA, UNSPECIFIED LOCATION: ICD-10-CM

## 2020-11-19 DIAGNOSIS — M85.80 OSTEOPENIA: ICD-10-CM

## 2020-11-19 RX ORDER — MULTIVIT-MIN/IRON/FOLIC ACID/K 18-600-40
1 CAPSULE ORAL DAILY
Qty: 90 TABLET | Refills: 3 | Status: SHIPPED | OUTPATIENT
Start: 2020-11-19

## 2021-01-19 DIAGNOSIS — E03.9 HYPOTHYROIDISM, UNSPECIFIED TYPE: ICD-10-CM

## 2021-01-19 RX ORDER — LEVOTHYROXINE SODIUM 100 UG/1
100 TABLET ORAL DAILY
Qty: 30 TABLET | Refills: 0 | Status: SHIPPED | OUTPATIENT
Start: 2021-01-19 | End: 2021-02-22

## 2021-01-19 NOTE — TELEPHONE ENCOUNTER
"Routing refill request to provider for review/approval because:  Former [facility]UCK[facility]OM[facility] pt      Requested Prescriptions   Pending Prescriptions Disp Refills     levothyroxine (SYNTHROID/LEVOTHROID) 100 MCG tablet       Sig: Take 1 tablet (100 mcg) by mouth daily       Thyroid Protocol Failed - 1/19/2021  9:29 AM        Failed - Recent (12 mo) or future (30 days) visit within the authorizing provider's specialty     Patient has had an office visit with the authorizing provider or a provider within the authorizing providers department within the previous 12 mos or has a future within next 30 days. See \"Patient Info\" tab in inbasket, or \"Choose Columns\" in Meds & Orders section of the refill encounter.              Failed - Normal TSH on file in past 12 months     Recent Labs   Lab Test 09/29/20  1417   TSH 5.69*              Passed - Patient is 12 years or older        Passed - Medication is active on med list        Passed - No active pregnancy on record     If patient is pregnant or has had a positive pregnancy test, please check TSH.          Passed - No positive pregnancy test in past 12 months     If patient is pregnant or has had a positive pregnancy test, please check TSH.             Avani PRIEST RN, BSN      "

## 2021-01-25 ENCOUNTER — TELEPHONE (OUTPATIENT)
Dept: FAMILY MEDICINE | Facility: CLINIC | Age: 86
End: 2021-01-25

## 2021-01-25 DIAGNOSIS — I48.91 NEW ONSET A-FIB (H): ICD-10-CM

## 2021-01-26 RX ORDER — DILTIAZEM HYDROCHLORIDE 120 MG/1
120 CAPSULE, COATED, EXTENDED RELEASE ORAL DAILY
Qty: 90 CAPSULE | Refills: 0 | Status: SHIPPED | OUTPATIENT
Start: 2021-01-26 | End: 2021-04-26

## 2021-01-26 NOTE — TELEPHONE ENCOUNTER
"Requested Prescriptions   Pending Prescriptions Disp Refills     diltiazem ER COATED BEADS (CARDIZEM CD/CARTIA XT) 120 MG 24 hr capsule 90 capsule 1     Sig: Take 1 capsule (120 mg) by mouth daily       Calcium Channel Blockers Protocol  Failed - 1/25/2021  8:47 AM        Failed - Recent (12 mo) or future (30 days) visit within the authorizing provider's specialty     Patient has had an office visit with the authorizing provider or a provider within the authorizing providers department within the previous 12 mos or has a future within next 30 days. See \"Patient Info\" tab in inbasket, or \"Choose Columns\" in Meds & Orders section of the refill encounter.              Passed - Blood pressure under 140/90 in past 12 months     BP Readings from Last 3 Encounters:   09/29/20 110/80   05/27/20 102/56   05/26/20 134/77                 Passed - Normal ALT in past 12 months     Recent Labs   Lab Test 05/25/20  0524   ALT 31             Passed - Medication is active on med list        Passed - Patient is age 18 or older        Passed - No active pregnancy on record        Passed - Normal serum creatinine on file in past 12 months     Recent Labs   Lab Test 09/29/20  1417   CR 0.94       Ok to refill medication if creatinine is low          Passed - No positive pregnancy test in past 12 months           rivaroxaban ANTICOAGULANT (XARELTO ANTICOAGULANT) 20 MG TABS tablet 90 tablet 1     Sig: Take 1 tablet (20 mg) by mouth daily (with dinner)       Direct Oral Anticoagulant Agents Failed - 1/25/2021  8:47 AM        Failed - Creatinine Clearance greater than 50 ml/min on file in past 3 mos     No lab results found.          Failed - Serum creatinine less than or equal to 1.4 on file in past 3 mos     Recent Labs   Lab Test 09/29/20  1417   CR 0.94       Ok to refill medication if creatinine is low          Failed - Recent (6 mo) or future (30 days) visit within the authorizing provider's specialty        Passed - Normal Platelets " on file in past 12 months     Recent Labs   Lab Test 05/26/20  0513                  Passed - Medication is active on med list        Passed - Patient is 18 years of age or older        Passed - No active pregnancy on record        Passed - No positive pregnancy test within past 12 months

## 2021-01-29 NOTE — TELEPHONE ENCOUNTER
[place] daughter called notified and understood  Will call us  to getting refills.  Katty [facility]

## 2021-02-19 DIAGNOSIS — E03.9 HYPOTHYROIDISM, UNSPECIFIED TYPE: ICD-10-CM

## 2021-02-22 RX ORDER — LEVOTHYROXINE SODIUM 100 UG/1
TABLET ORAL
Qty: 30 TABLET | Refills: 0 | Status: SHIPPED | OUTPATIENT
Start: 2021-02-22 | End: 2021-03-18

## 2021-02-22 NOTE — TELEPHONE ENCOUNTER
"Routing refill request to provider for review/approval because:  Rachell given x1 and patient did not follow up, please advise  Former [facility] pt    Requested Prescriptions   Pending Prescriptions Disp Refills     EUTHYROX 100 MCG tablet [Pharmacy Med Name: Euthyrox 100 MCG Oral Tablet] 30 tablet 0     Sig: Take 1 tablet by mouth once daily       Thyroid Protocol Failed - 2/19/2021  4:09 PM        Failed - Normal TSH on file in past 12 months     Recent Labs   Lab Test 09/29/20  1417   TSH 5.69*              Passed - Patient is 12 years or older        Passed - Recent (12 mo) or future (30 days) visit within the authorizing provider's specialty     Patient has had an office visit with the authorizing provider or a provider within the authorizing providers department within the previous 12 mos or has a future within next 30 days. See \"Patient Info\" tab in inbasket, or \"Choose Columns\" in Meds & Orders section of the refill encounter.              Passed - Medication is active on med list        Passed - No active pregnancy on record     If patient is pregnant or has had a positive pregnancy test, please check TSH.          Passed - No positive pregnancy test in past 12 months     If patient is pregnant or has had a positive pregnancy test, please check TSH.             Avani PRIEST, RN, BSN                "

## 2021-03-18 DIAGNOSIS — E03.9 HYPOTHYROIDISM, UNSPECIFIED TYPE: ICD-10-CM

## 2021-03-18 RX ORDER — LEVOTHYROXINE SODIUM 100 UG/1
TABLET ORAL
Qty: 30 TABLET | Refills: 0 | Status: SHIPPED | OUTPATIENT
Start: 2021-03-18 | End: 2021-04-21

## 2021-04-20 DIAGNOSIS — E03.9 HYPOTHYROIDISM, UNSPECIFIED TYPE: ICD-10-CM

## 2021-04-21 RX ORDER — LEVOTHYROXINE SODIUM 100 UG/1
100 TABLET ORAL DAILY
Qty: 30 TABLET | Refills: 0 | Status: SHIPPED | OUTPATIENT
Start: 2021-04-21 | End: 2021-05-18

## 2021-04-23 DIAGNOSIS — I48.91 NEW ONSET A-FIB (H): ICD-10-CM

## 2021-04-24 DIAGNOSIS — I48.91 NEW ONSET A-FIB (H): ICD-10-CM

## 2021-04-25 DIAGNOSIS — R60.9 EDEMA, UNSPECIFIED TYPE: ICD-10-CM

## 2021-04-26 RX ORDER — DILTIAZEM HYDROCHLORIDE 120 MG/1
CAPSULE, EXTENDED RELEASE ORAL
Qty: 90 CAPSULE | Refills: 0 | Status: SHIPPED | OUTPATIENT
Start: 2021-04-26 | End: 2022-08-23 | Stop reason: DRUGHIGH

## 2021-04-26 RX ORDER — FUROSEMIDE 20 MG
20 TABLET ORAL DAILY
Qty: 90 TABLET | Refills: 1 | Status: SHIPPED | OUTPATIENT
Start: 2021-04-26 | End: 2021-11-15

## 2021-04-27 RX ORDER — RIVAROXABAN 20 MG/1
TABLET, FILM COATED ORAL
Qty: 30 TABLET | Refills: 0 | Status: SHIPPED | OUTPATIENT
Start: 2021-04-27 | End: 2021-05-26

## 2021-05-04 ENCOUNTER — TELEPHONE (OUTPATIENT)
Dept: FAMILY MEDICINE | Facility: CLINIC | Age: 86
End: 2021-05-04

## 2021-05-04 DIAGNOSIS — E11.65 TYPE 2 DIABETES MELLITUS WITH HYPERGLYCEMIA, WITHOUT LONG-TERM CURRENT USE OF INSULIN (H): Chronic | ICD-10-CM

## 2021-05-05 NOTE — TELEPHONE ENCOUNTER
Please call patient and help her schedule a follow up diabetic visit with labs with primary care provider. Refill put in for 1 month.     Thanks,  Inessa Wu, DNP, APRN-CNP

## 2021-05-05 NOTE — TELEPHONE ENCOUNTER
"Requested Prescriptions   Pending Prescriptions Disp Refills     insulin glargine (LANTUS SOLOSTAR) 100 UNIT/ML pen 9 mL      Sig: Inject 20 Units Subcutaneous every evening       Long Acting Insulin Protocol Failed - 5/4/2021  7:20 AM        Failed - HgbA1C in past 3 or 6 months     If HgbA1C is 8 or greater, it needs to be on file within the past 3 months.  If less than 8, must be on file within the past 6 months.     Recent Labs   Lab Test 09/29/20  1417   A1C 8.6*             Failed - Recent (6 mo) or future (30 days) visit within the authorizing provider's specialty     Patient had office visit in the last 6 months or has a visit in the next 30 days with authorizing provider or within the authorizing provider's specialty.  See \"Patient Info\" tab in inbasket, or \"Choose Columns\" in Meds & Orders section of the refill encounter.            Passed - Serum creatinine on file in past 12 months     Recent Labs   Lab Test 09/29/20  1417   CR 0.94       Ok to refill medication if creatinine is low          Passed - Medication is active on med list        Passed - Patient is age 18 or older             "

## 2021-05-13 DIAGNOSIS — E11.65 TYPE 2 DIABETES MELLITUS WITH HYPERGLYCEMIA, WITHOUT LONG-TERM CURRENT USE OF INSULIN (H): ICD-10-CM

## 2021-05-13 RX ORDER — PEN NEEDLE, DIABETIC 32GX 5/32"
NEEDLE, DISPOSABLE MISCELLANEOUS
Qty: 100 EACH | Refills: 0 | Status: SHIPPED | OUTPATIENT
Start: 2021-05-13

## 2021-05-13 NOTE — TELEPHONE ENCOUNTER
S-(situation): Call placed to patient regarding refill     request.B-(background):  She has difficulty hearing and understanding my conversation. I reached out to daughter, yon. Patient is due for lab and office visit. Patient has see Sofia Ascencio who is no longer with [place]. Yon will reach out to JUDY Ascencio at [facility] as they prefer to stay with her.          A-(assessment): Will refill needle request.    R-(recommendations): Daughter is aware future prescriptions and care will be directed through JUDY Ascencio at [facility].    Cara WHIPPLE RN

## 2021-05-25 DIAGNOSIS — I48.91 NEW ONSET A-FIB (H): ICD-10-CM

## 2021-06-24 DIAGNOSIS — I48.91 NEW ONSET A-FIB (H): ICD-10-CM

## 2021-06-24 RX ORDER — RIVAROXABAN 20 MG/1
TABLET, FILM COATED ORAL
Qty: 30 TABLET | Refills: 0 | OUTPATIENT
Start: 2021-06-24

## 2021-06-24 NOTE — TELEPHONE ENCOUNTER
Spoke with patients daughter, Adela sees provider at [facility], she will call pharmacy and let them know

## 2021-11-04 DIAGNOSIS — R60.9 EDEMA, UNSPECIFIED TYPE: ICD-10-CM

## 2021-11-08 RX ORDER — FUROSEMIDE 20 MG
TABLET ORAL
Qty: 90 TABLET | Refills: 0 | OUTPATIENT
Start: 2021-11-08

## 2021-11-08 NOTE — TELEPHONE ENCOUNTER
"Patient is no longer seen at this clinic. She sees a provider at [facility].  Asha Valle RN      Requested Prescriptions   Pending Prescriptions Disp Refills     furosemide (LASIX) 20 MG tablet [Pharmacy Med Name: Furosemide 20 MG Oral Tablet] 90 tablet 0     Sig: Take 1 tablet by mouth once daily       Diuretics (Including Combos) Protocol Failed - 11/4/2021 10:34 AM        Failed - Blood pressure under 140/90 in past 12 months     BP Readings from Last 3 Encounters:   09/29/20 110/80   05/27/20 102/56   05/26/20 134/77                 Failed - Recent (12 mo) or future (30 days) visit within the authorizing provider's specialty     Patient has had an office visit with the authorizing provider or a provider within the authorizing providers department within the previous 12 mos or has a future within next 30 days. See \"Patient Info\" tab in inbasket, or \"Choose Columns\" in Meds & Orders section of the refill encounter.              Failed - Normal serum creatinine on file in past 12 months     Recent Labs   Lab Test 09/29/20  1417   CR 0.94              Failed - Normal serum potassium on file in past 12 months     Recent Labs   Lab Test 09/29/20  1417   POTASSIUM 4.9                    Failed - Normal serum sodium on file in past 12 months     Recent Labs   Lab Test 09/29/20  1417                 Passed - Medication is active on med list        Passed - Patient is age 18 or older        Passed - No active pregancy on record        Passed - No positive pregnancy test in past 12 months               "

## 2021-11-11 DIAGNOSIS — R60.9 EDEMA, UNSPECIFIED TYPE: ICD-10-CM

## 2021-11-15 RX ORDER — FUROSEMIDE 20 MG
TABLET ORAL
Qty: 0.01 TABLET | Refills: 0 | Status: SHIPPED | OUTPATIENT
Start: 2021-11-15 | End: 2022-08-23

## 2022-08-18 ENCOUNTER — TRANSITIONAL CARE UNIT VISIT (OUTPATIENT)
Dept: GERIATRICS | Facility: CLINIC | Age: 87
End: 2022-08-18
Payer: COMMERCIAL

## 2022-08-18 VITALS
HEART RATE: 99 BPM | RESPIRATION RATE: 18 BRPM | BODY MASS INDEX: 29.89 KG/M2 | OXYGEN SATURATION: 91 % | TEMPERATURE: 96.8 F | WEIGHT: 182.4 LBS | SYSTOLIC BLOOD PRESSURE: 122 MMHG | DIASTOLIC BLOOD PRESSURE: 88 MMHG

## 2022-08-18 DIAGNOSIS — I48.91 ATRIAL FIBRILLATION WITH RAPID VENTRICULAR RESPONSE (H): ICD-10-CM

## 2022-08-18 DIAGNOSIS — Z79.4 TYPE 2 DIABETES MELLITUS WITH HYPERGLYCEMIA, WITH LONG-TERM CURRENT USE OF INSULIN (H): Primary | ICD-10-CM

## 2022-08-18 DIAGNOSIS — E11.65 TYPE 2 DIABETES MELLITUS WITH HYPERGLYCEMIA, WITH LONG-TERM CURRENT USE OF INSULIN (H): Primary | ICD-10-CM

## 2022-08-18 DIAGNOSIS — M15.0 PRIMARY OSTEOARTHRITIS INVOLVING MULTIPLE JOINTS: ICD-10-CM

## 2022-08-18 DIAGNOSIS — J18.9 COMMUNITY ACQUIRED PNEUMONIA OF RIGHT MIDDLE LOBE OF LUNG: ICD-10-CM

## 2022-08-18 DIAGNOSIS — I50.32 CHRONIC DIASTOLIC HEART FAILURE (H): ICD-10-CM

## 2022-08-18 DIAGNOSIS — F03.90 DEMENTIA WITHOUT BEHAVIORAL DISTURBANCE, UNSPECIFIED DEMENTIA TYPE: ICD-10-CM

## 2022-08-18 DIAGNOSIS — I10 BENIGN ESSENTIAL HYPERTENSION: ICD-10-CM

## 2022-08-18 DIAGNOSIS — E03.9 ACQUIRED HYPOTHYROIDISM: ICD-10-CM

## 2022-08-18 PROCEDURE — 99309 SBSQ NF CARE MODERATE MDM 30: CPT | Performed by: NURSE PRACTITIONER

## 2022-08-18 RX ORDER — IPRATROPIUM BROMIDE AND ALBUTEROL SULFATE 2.5; .5 MG/3ML; MG/3ML
1 SOLUTION RESPIRATORY (INHALATION) EVERY 6 HOURS PRN
COMMUNITY
End: 2022-08-29

## 2022-08-18 RX ORDER — POLYETHYLENE GLYCOL 3350 17 G/17G
17 POWDER, FOR SOLUTION ORAL DAILY
COMMUNITY

## 2022-08-18 RX ORDER — DILTIAZEM HYDROCHLORIDE 300 MG/1
300 CAPSULE, COATED, EXTENDED RELEASE ORAL DAILY
COMMUNITY

## 2022-08-18 RX ORDER — FUROSEMIDE 20 MG
20 TABLET ORAL 2 TIMES DAILY
COMMUNITY

## 2022-08-18 RX ORDER — ACETAMINOPHEN 500 MG
500 TABLET ORAL 2 TIMES DAILY PRN
COMMUNITY

## 2022-08-18 RX ORDER — INSULIN GLARGINE 100 [IU]/ML
28 INJECTION, SOLUTION SUBCUTANEOUS AT BEDTIME
COMMUNITY
End: 2022-08-23

## 2022-08-18 NOTE — LETTER
8/18/2022        RE: Adela Means  [address] [facility] [place] [address]        [facility] GERIATRICS  INITIAL VISIT NOTE  August 18, 2022      PRIMARY CARE PROVIDER AND CLINIC:  Clinic, [facility] [address] [facility] [address]    [facility] Medical Record Number:  [id number]  Place of Service where encounter took place:  [facility] AT [facility] ([facility]) [[id number]]    Chief Complaint   Patient presents with     Hospital F/U       HPI:    Adela Means is a 89 year old  (3/10/1927) female was admitted to the above facility from  [facility]. Hospital stay 8/6/22 through 8/16/22 where they were admitted for community acquired pneumonia.  Now admitted to this facility for  rehab, medical management and nursing care.      History obtained from: facility chart records, facility staff, patient report and Care Everywhere Epic chart review.      Brief Hospital Course: Adela is a 89 year old female whom has chronic diseases that include Type 2 DM, Atrial Fib, Heart failure, Hypothyroidism and dementia.  On 8/6/22, Adela was brought to [facility] in [place], [place] due to cough, incontinence, barely bear weight, and lethargy.  She lives in her own house with two other family members that one had a respiratory illness but tested negative for COVID.    Adela arrived via EMS with 4L/min of O2.  Telemetry found her HR in A fib 100-130's.  At her home, the O2 was found to be in the 80's.    CXR showed right perihilar infiltrate.  Started on Rocephin and Azithromycin.  IV diltiazem for Atrial Fib.  Oxygen used at 4L and so recommended for admission.      Atrial Fib was controlled with the IV Diltiazem and titrated her home dose up to 300mg.  ECHO done and EF showed 55-60%  Was on IV Lasix 20mg twice a day and discharged with oral dose as well.  Enlarged left atrium but unable to see diastolic function fully.  Xarelto dose lowered from 20mg daily to 15mg daily due to  renal clearance.    Sugars elevated and home dose increased from 20 units daily to 28 units.    MOCA score 1/30.  Did work with PT/OT and recommended for therapy before going home.      TCU Course: found Adela today sleeping.  She tried to open her eyes but could not.  Sleeping peacefully.  Oxygen present at 1L/min via NC and concentrator.  Goal of family is for her not to go home on oxygen.   Walker near/far from bed and tennis shoes neatly on the floor.      Reviewing nursing notes as Adela is demented.  She arrived on 8/16/22 and almost right away started to self transfer and yelling for help as hard to comprehend how to use the call light.  Once she was helped with cares and toileting and went to bed she fell asleep.     Need many reminders for call light and safety.      CODE STATUS/ADVANCE DIRECTIVES: CPR/Full code     ALLERGIES:  Allergies   Allergen Reactions     Ibuprofen Swelling     Of lips     Lisinopril Cough     Vitamin C Swelling     Of lips, But is fine with orange juice       PAST MEDICAL HISTORY:   Past Medical History:   Diagnosis Date     Basal cell carcinoma      Hepatic cyst      Hypertriglyceridemia     mild      Osteopenia     dexa 11/2008       PAST SURGICAL HISTORY:   Past Surgical History:   Procedure Laterality Date     APPENDECTOMY  age 18     CHOLECYSTECTOMY, OPEN       HYSTERECTOMY, VAGINAL         FAMILY HISTORY:   Family History   Problem Relation Age of Onset     No Known Problems Daughter      No Known Problems Daughter      No Known Problems Daughter      Unable to review due to cognitive impairment    SOCIAL HISTORY:   Patient's living condition: lives with family, daughter     MEDICATIONS  Post Discharge Medication Reconciliation Status: discharge medications reconciled, continue medications without change.  Current Outpatient Medications   Medication Sig Dispense Refill     acetaminophen (TYLENOL) 500 MG tablet Take 500 mg by mouth 2 times daily as needed for mild pain        calcium carbonate (OS-KATHY) 1500 (600 Ca) MG tablet Take 600 mg by mouth daily       diltiazem ER COATED BEADS (DILTIAZEM CD) 300 MG 24 hr capsule Take 300 mg by mouth daily       EUTHYROX 100 MCG tablet Take 1 tablet by mouth once daily 30 tablet 0     furosemide (LASIX) 20 MG tablet Take 20 mg by mouth 2 times daily       insulin glargine (LANTUS VIAL) 100 UNIT/ML vial Inject 28 Units Subcutaneous At Bedtime       ipratropium - albuterol 0.5 mg/2.5 mg/3 mL (DUONEB) 0.5-2.5 (3) MG/3ML neb solution Take 1 vial by nebulization every 6 hours as needed for shortness of breath / dyspnea or wheezing       ketoconazole (NIZORAL) 2 % external shampoo APPLY TO AFFECTED AREA(S) AND WASH OFF AFTER 5 MINUTES. USE 2-3 TIMES PER WEEK. 240 mL 3     polyethylene glycol (MIRALAX) 17 g packet Take 17 g by mouth daily       rivaroxaban ANTICOAGULANT (XARELTO) 15 MG TABS tablet Take 15 mg by mouth daily (with dinner)       Vitamin D, Cholecalciferol, 25 MCG (1000 UT) TABS Take 1 tablet (1,000 Units) by mouth daily 90 tablet 3     blood glucose (NO BRAND SPECIFIED) test strip Use to test blood sugar 1 time daily 100 strip 1     blood glucose monitoring (PEREZ MICROLET) lancets Use to test blood sugar 1 times daily 100 each 4     insulin pen needle (BD NAEEM U/F) 32G X 4 MM miscellaneous Use 1 daily as directed. 100 each 0     order for DME Equipment being ordered: blood pressure cuff 1 Device 0       ROS:    Unable to obtain due to cognitive impairment  - was sleeping and tried to open eyes.  No acute distress.      PHYSICAL EXAM:  /88   Pulse 99   Temp 96.8  F (36  C)   Resp 18   Wt 82.7 kg (182 lb 6.4 oz)   SpO2 91%   BMI 29.89 kg/m    Elderly well nourished female laying on her back in bed with the HOB up 30 degrees.    Skin is pale, dry and warm.    No edema in lower extremities.    Heart rate regular/slightly irregular.  Pulses in the 70-80's with one or two outliers in the 110's.  No audible adventitious sounds.   Respirations are even and unlabored.  Diminished in bases.    Abdomen is round, soft and non-tender.  Bowel sounds diminished.    Put the covers back over her and she moved her hands to hold onto them.       LABORATORY/IMAGING DATA:  [facility]  Most recent labs were from 8/16/22.  WBC = 8.5    ASSESSMENT/PLAN:  (E11.65,  Z79.4) Type 2 diabetes mellitus with hyperglycemia, with long-term current use of insulin (H)  (primary encounter diagnosis)  Comment: nursing has already alerted this NP that her last 4 blood sugars consistently above 240.    Currently on Glargine 28 units in the PM.  Glipizide 15mg in AM  Expect that her sugars may be elevated due to having pneumonia.    Will not change anything at this time.  Will wait more days before assessing if needing more insulin.    (J18.9) Community acquired pneumonia of right middle lobe of lung  Comment: finished her ABXs at the hospital and did not come with anything.  Monitoring for respiratory issues and wheening off oxygen as able.  Will be attending therapies while here for strengthening and mobility, cognition, and exercise.    (I50.32) Chronic diastolic heart failure (H)  Comment: came with Lasix 20mg twice a day.  Will not order any labs at this time.  Monitor for symptoms as this too can mimic pneumonia symptoms.    (I48.91) Atrial fibrillation with rapid ventricular response (H)  Comment: Xarelto lower to 15mg daily while in the hospital due  Renal clearance.  Now is on Diltiazem CD 300mg daily.  Pulses have settled since admission day two days ago.  Monitor for signs of heart distress.    (E03.9) Acquired hypothyroidism  Comment: does take a replacement at 100mcg daily.  Monitored as outpatient.    (I10) Benign essential hypertension, BP <140/90  Comment: blood pressures ranging from 100-140's/60-80's.  No changes.      (M89.49) Primary osteoarthritis involving multiple joints  Comment: typically ambulated with a walker at  home.  Goal is to get her to be safe with the walker.  Right now can see she is disoriented to her space so hard for her to navigate or remember to ask for help.    Is on tylenol 500mg twice a day.  No changes.      (F03.90) Dementia without behavioral disturbance, unspecified dementia type (H)  Comment: scored 1/30 on the MOCA.  Now with a recent move to here, expect her to be disoriented.  Continue to provide a safe environment, give choices and check to make sure she is not moving without assist.      Orders:   No new orders today.    Total time spent with patient visit at the skilled nursing facility was 25 min including patient visit and review of past records. Greater than 50% of total time spent with counseling and coordinating care due to gathering information of hospitalization and why here, can see goals of care in charting    Electronically signed by:  MARTHA Campos CNP                   Sincerely,        MARTHA Campos CNP

## 2022-08-18 NOTE — PROGRESS NOTES
[facility] GERIATRICS  INITIAL VISIT NOTE  August 18, 2022      PRIMARY CARE PROVIDER AND CLINIC:  Clinic, [address] [address]    [facility] Medical Record Number:  [id number]  Place of Service where encounter took place:  [facility] AT [facility] ([facility]) [[id number]]    Chief Complaint   Patient presents with     Hospital F/U       HPI:    Adela Means is a 89 year old  (3/10/1927) female was admitted to the above facility from  [facility]. Hospital stay 8/6/22 through 8/16/22 where they were admitted for community acquired pneumonia.  Now admitted to this facility for  rehab, medical management and nursing care.      History obtained from: facility chart records, facility staff, patient report and Care Everywhere Epic chart review.      Brief Hospital Course: Adela is a 89 year old female whom has chronic diseases that include Type 2 DM, Atrial Fib, Heart failure, Hypothyroidism and dementia.  On 8/6/22, Adela was brought to [facility] in [place], [place] due to cough, incontinence, barely bear weight, and lethargy.  She lives in her own house with two other family members that one had a respiratory illness but tested negative for COVID.    Adela arrived via EMS with 4L/min of O2.  Telemetry found her HR in A fib 100-130's.  At her home, the O2 was found to be in the 80's.    CXR showed right perihilar infiltrate.  Started on Rocephin and Azithromycin.  IV diltiazem for Atrial Fib.  Oxygen used at 4L and so recommended for admission.      Atrial Fib was controlled with the IV Diltiazem and titrated her home dose up to 300mg.  ECHO done and EF showed 55-60%  Was on IV Lasix 20mg twice a day and discharged with oral dose as well.  Enlarged left atrium but unable to see diastolic function fully.  Xarelto dose lowered from 20mg daily to 15mg daily due to renal clearance.    Sugars elevated and home dose increased from 20 units daily to 28  units.    MOCA score 1/30.  Did work with PT/OT and recommended for therapy before going home.      TCU Course: found Adela today sleeping.  She tried to open her eyes but could not.  Sleeping peacefully.  Oxygen present at 1L/min via NC and concentrator.  Goal of family is for her not to go home on oxygen.   Walker near/far from bed and tennis shoes neatly on the floor.      Reviewing nursing notes as Adela is demented.  She arrived on 8/16/22 and almost right away started to self transfer and yelling for help as hard to comprehend how to use the call light.  Once she was helped with cares and toileting and went to bed she fell asleep.     Need many reminders for call light and safety.      CODE STATUS/ADVANCE DIRECTIVES: CPR/Full code     ALLERGIES:  Allergies   Allergen Reactions     Ibuprofen Swelling     Of lips     Lisinopril Cough     Vitamin C Swelling     Of lips, But is fine with orange juice       PAST MEDICAL HISTORY:   Past Medical History:   Diagnosis Date     Basal cell carcinoma      Hepatic cyst      Hypertriglyceridemia     mild      Osteopenia     dexa 11/2008       PAST SURGICAL HISTORY:   Past Surgical History:   Procedure Laterality Date     APPENDECTOMY  age 18     CHOLECYSTECTOMY, OPEN       HYSTERECTOMY, VAGINAL         FAMILY HISTORY:   Family History   Problem Relation Age of Onset     No Known Problems Daughter      No Known Problems Daughter      No Known Problems Daughter      Unable to review due to cognitive impairment    SOCIAL HISTORY:   Patient's living condition: lives with family, daughter     MEDICATIONS  Post Discharge Medication Reconciliation Status: discharge medications reconciled, continue medications without change.  Current Outpatient Medications   Medication Sig Dispense Refill     acetaminophen (TYLENOL) 500 MG tablet Take 500 mg by mouth 2 times daily as needed for mild pain       calcium carbonate (OS-KATHY) 1500 (600 Ca) MG tablet Take 600 mg by mouth daily        diltiazem ER COATED BEADS (DILTIAZEM CD) 300 MG 24 hr capsule Take 300 mg by mouth daily       EUTHYROX 100 MCG tablet Take 1 tablet by mouth once daily 30 tablet 0     furosemide (LASIX) 20 MG tablet Take 20 mg by mouth 2 times daily       insulin glargine (LANTUS VIAL) 100 UNIT/ML vial Inject 28 Units Subcutaneous At Bedtime       ipratropium - albuterol 0.5 mg/2.5 mg/3 mL (DUONEB) 0.5-2.5 (3) MG/3ML neb solution Take 1 vial by nebulization every 6 hours as needed for shortness of breath / dyspnea or wheezing       ketoconazole (NIZORAL) 2 % external shampoo APPLY TO AFFECTED AREA(S) AND WASH OFF AFTER 5 MINUTES. USE 2-3 TIMES PER WEEK. 240 mL 3     polyethylene glycol (MIRALAX) 17 g packet Take 17 g by mouth daily       rivaroxaban ANTICOAGULANT (XARELTO) 15 MG TABS tablet Take 15 mg by mouth daily (with dinner)       Vitamin D, Cholecalciferol, 25 MCG (1000 UT) TABS Take 1 tablet (1,000 Units) by mouth daily 90 tablet 3     blood glucose (NO BRAND SPECIFIED) test strip Use to test blood sugar 1 time daily 100 strip 1     blood glucose monitoring (PEREZ MICROLET) lancets Use to test blood sugar 1 times daily 100 each 4     insulin pen needle (BD NAEEM U/F) 32G X 4 MM miscellaneous Use 1 daily as directed. 100 each 0     order for DME Equipment being ordered: blood pressure cuff 1 Device 0       ROS:    Unable to obtain due to cognitive impairment  - was sleeping and tried to open eyes.  No acute distress.      PHYSICAL EXAM:  /88   Pulse 99   Temp 96.8  F (36  C)   Resp 18   Wt 82.7 kg (182 lb 6.4 oz)   SpO2 91%   BMI 29.89 kg/m    Elderly well nourished female laying on her back in bed with the HOB up 30 degrees.    Skin is pale, dry and warm.    No edema in lower extremities.    Heart rate regular/slightly irregular.  Pulses in the 70-80's with one or two outliers in the 110's.  No audible adventitious sounds.  Respirations are even and unlabored.  Diminished in bases.    Abdomen is round, soft and  non-tender.  Bowel sounds diminished.    Put the covers back over her and she moved her hands to hold onto them.       LABORATORY/IMAGING DATA:  [facility]  Most recent labs were from 8/16/22.  WBC = 8.5    ASSESSMENT/PLAN:  (E11.65,  Z79.4) Type 2 diabetes mellitus with hyperglycemia, with long-term current use of insulin (H)  (primary encounter diagnosis)  Comment: nursing has already alerted this NP that her last 4 blood sugars consistently above 240.    Currently on Glargine 28 units in the PM.  Glipizide 15mg in AM  Expect that her sugars may be elevated due to having pneumonia.    Will not change anything at this time.  Will wait more days before assessing if needing more insulin.    (J18.9) Community acquired pneumonia of right middle lobe of lung  Comment: finished her ABXs at the hospital and did not come with anything.  Monitoring for respiratory issues and wheening off oxygen as able.  Will be attending therapies while here for strengthening and mobility, cognition, and exercise.    (I50.32) Chronic diastolic heart failure (H)  Comment: came with Lasix 20mg twice a day.  Will not order any labs at this time.  Monitor for symptoms as this too can mimic pneumonia symptoms.    (I48.91) Atrial fibrillation with rapid ventricular response (H)  Comment: Xarelto lower to 15mg daily while in the hospital due  Renal clearance.  Now is on Diltiazem CD 300mg daily.  Pulses have settled since admission day two days ago.  Monitor for signs of heart distress.    (E03.9) Acquired hypothyroidism  Comment: does take a replacement at 100mcg daily.  Monitored as outpatient.    (I10) Benign essential hypertension, BP <140/90  Comment: blood pressures ranging from 100-140's/60-80's.  No changes.      (M89.49) Primary osteoarthritis involving multiple joints  Comment: typically ambulated with a walker at home.  Goal is to get her to be safe with the walker.  Right now can see she is disoriented  to her space so hard for her to navigate or remember to ask for help.    Is on tylenol 500mg twice a day.  No changes.      (F03.90) Dementia without behavioral disturbance, unspecified dementia type (H)  Comment: scored 1/30 on the MOCA.  Now with a recent move to here, expect her to be disoriented.  Continue to provide a safe environment, give choices and check to make sure she is not moving without assist.      Orders:   No new orders today.    Total time spent with patient visit at the skilled nursing facility was 25 min including patient visit and review of past records. Greater than 50% of total time spent with counseling and coordinating care due to gathering information of hospitalization and why here, can see goals of care in charting    Electronically signed by:  MARTHA Campos CNP

## 2022-08-22 ENCOUNTER — TRANSITIONAL CARE UNIT VISIT (OUTPATIENT)
Dept: GERIATRICS | Facility: CLINIC | Age: 87
End: 2022-08-22
Payer: COMMERCIAL

## 2022-08-22 VITALS
RESPIRATION RATE: 18 BRPM | DIASTOLIC BLOOD PRESSURE: 92 MMHG | SYSTOLIC BLOOD PRESSURE: 118 MMHG | BODY MASS INDEX: 29.99 KG/M2 | OXYGEN SATURATION: 92 % | TEMPERATURE: 97.5 F | HEART RATE: 78 BPM | WEIGHT: 183 LBS

## 2022-08-22 DIAGNOSIS — Z79.4 TYPE 2 DIABETES MELLITUS WITH HYPERGLYCEMIA, WITH LONG-TERM CURRENT USE OF INSULIN (H): Primary | ICD-10-CM

## 2022-08-22 DIAGNOSIS — J18.9 COMMUNITY ACQUIRED PNEUMONIA OF RIGHT MIDDLE LOBE OF LUNG: ICD-10-CM

## 2022-08-22 DIAGNOSIS — F03.90 DEMENTIA WITHOUT BEHAVIORAL DISTURBANCE, UNSPECIFIED DEMENTIA TYPE: ICD-10-CM

## 2022-08-22 DIAGNOSIS — E11.65 TYPE 2 DIABETES MELLITUS WITH HYPERGLYCEMIA, WITH LONG-TERM CURRENT USE OF INSULIN (H): Primary | ICD-10-CM

## 2022-08-22 PROCEDURE — 99309 SBSQ NF CARE MODERATE MDM 30: CPT | Performed by: NURSE PRACTITIONER

## 2022-08-22 NOTE — PROGRESS NOTES
[facility] GERIATRICS  ACUTE/EPISODIC VISIT    [facility] Medical Record Number:  [id number]  Place of Service where encounter took place:  [facility] AT [facility] ([facility]) [[id number]]    Chief Complaint   Patient presents with     RECHECK       HPI:    Adela Means is a 89 year old  (3/10/1927), who is being seen today for an episodic care visit.  HPI information obtained from: facility chart records, facility staff and patient report.    Today's concern is:    Diagnoses       Codes Comments    Type 2 diabetes mellitus with hyperglycemia, with long-term current use of insulin (H)    -  Primary E11.65, Z79.4     Community acquired pneumonia of right middle lobe of lung     J18.9     Dementia without behavioral disturbance, unspecified dementia type (H)     F03.90           Came to see Adela today as staff still updating on her blood sugars.  Was admitted back on 8/16/22 and on Glipizide oral and Lantus 28 units in PM.    Staff where wheeling Adela into the therapy room.  She was holding onto the w/c so she would not fall out.  No oxygen used at this time.  Came with pneumonia diagnosis and goal was to have that done before she returns home.      Staff stating that her diet includes a lot of Milk as she asks for this.  Trying to think is it really milk she wants or something to drink.  They give her water and she will drink that.  Milk can leave a person thirsty and with her sugars running high, can be more prone to being thirsty.      Will answer questions as best as she can.  Disoriented to place and time.  Answers to name.  SW did a BIMS recently and scored 4/15 and the PHQ-9 = 1  Adela told her she thinks she is getting great care here.        ALLERGIES:    Allergies   Allergen Reactions     Ibuprofen Swelling     Of lips     Lisinopril Cough     Vitamin C Swelling     Of lips, But is fine with orange juice        MEDICATIONS:  Post Discharge Medication Reconciliation Status:  patient was not discharged from an inpatient facility or TCU. Medications reconciled today    Current Outpatient Medications   Medication Sig Dispense Refill     insulin glargine (LANTUS VIAL) 100 UNIT/ML vial 10 units sq in AM and 28 units sq in PM       acetaminophen (TYLENOL) 500 MG tablet Take 500 mg by mouth 2 times daily as needed for mild pain       blood glucose (NO BRAND SPECIFIED) test strip Use to test blood sugar 1 time daily 100 strip 1     blood glucose monitoring (PEREZ MICROLET) lancets Use to test blood sugar 1 times daily 100 each 4     calcium carbonate (OS-KATHY) 1500 (600 Ca) MG tablet Take 600 mg by mouth daily       diltiazem ER COATED BEADS (DILTIAZEM CD) 300 MG 24 hr capsule Take 300 mg by mouth daily       EUTHYROX 100 MCG tablet Take 1 tablet by mouth once daily 30 tablet 0     furosemide (LASIX) 20 MG tablet Take 20 mg by mouth 2 times daily       insulin pen needle (BD NAEEM U/F) 32G X 4 MM miscellaneous Use 1 daily as directed. 100 each 0     ipratropium - albuterol 0.5 mg/2.5 mg/3 mL (DUONEB) 0.5-2.5 (3) MG/3ML neb solution Take 1 vial by nebulization every 6 hours as needed for shortness of breath / dyspnea or wheezing       ketoconazole (NIZORAL) 2 % external shampoo APPLY TO AFFECTED AREA(S) AND WASH OFF AFTER 5 MINUTES. USE 2-3 TIMES PER WEEK. 240 mL 3     order for DME Equipment being ordered: blood pressure cuff 1 Device 0     polyethylene glycol (MIRALAX) 17 g packet Take 17 g by mouth daily       rivaroxaban ANTICOAGULANT (XARELTO) 15 MG TABS tablet Take 15 mg by mouth daily (with dinner)       Vitamin D, Cholecalciferol, 25 MCG (1000 UT) TABS Take 1 tablet (1,000 Units) by mouth daily 90 tablet 3       REVIEW OF SYSTEMS:    Unable to be obtained due to cognitive impairment.  Denied chest pain.  Overall stated she was good.      PHYSICAL EXAM:  BP (!) 118/92   Pulse 78   Temp 97.5  F (36.4  C)   Resp 18   Wt 83 kg (183 lb)   SpO2 92%   BMI 29.99 kg/m    Sitting up in the  wheelchair.  Well nourished.    Abdomen large round and soft as now seeing her in a sitting up position.    Coloring is pale and baseline.  Warm and dry.  Lungs are clear but does not take deep breaths on command.  Heart rate regular and slightly irregular but not tachy.    Sugars taken TID:  8am = 170's = 260's  12N = 250-380's  5pm = 280-320's    8/9/2022  HEMOGLOBIN A1C MONITORING (POCT) <=6.4 % 10.2 High        ASSESSMENT / PLAN:  (E11.65,  Z79.4) Type 2 diabetes mellitus with hyperglycemia, with long-term current use of insulin (H)  (primary encounter diagnosis)  Comment: sugars consistently elevated and A1c >10.    Will add 10 units of Lantus in the AM.  Leave the 28 units the same at [facility].  Hopefully this will even out her sugars.  Continue to monitor.  Plan: insulin glargine (LANTUS VIAL) 100 UNIT/ML vial    (J18.9) Community acquired pneumonia of right middle lobe of lung  Comment: feel this is resolving as she does not use the oxygen now.  No signs of shortness of breath.    Will check a CBC and BMP next lab day to check labs ongoing as feel she is a risk for lack of hydration and checking WBC.    (F03.90) Dementia without behavioral disturbance, unspecified dementia type (H)  Comment: - seems happy to be here.  Still needs reminders to use call light.  Will self transfer.  Assist with walker by staff.  No behaviors.      Orders:  1.  Add Lantus 10 units subcutaneous in AM - type 2 DM  2.  CBC and BMP on 8/25/22 for pneumonia, Type 2 DM    Electronically signed by  MARTHA Campos CNP

## 2022-08-22 NOTE — LETTER
8/22/2022        RE: Adela Means  [address] [facility] [address]        [facility] GERIATRICS  ACUTE/EPISODIC VISIT    [facility] Medical Record Number:  [id number]  Place of Service where encounter took place:  hospitals AT [facility] ([facility]) [[id number]]    Chief Complaint   Patient presents with     RECHECK       HPI:    Adela Means is a 89 year old  (3/10/1927), who is being seen today for an episodic care visit.  HPI information obtained from: facility chart records, facility staff and patient report.    Today's concern is:    Diagnoses       Codes Comments    Type 2 diabetes mellitus with hyperglycemia, with long-term current use of insulin (H)    -  Primary E11.65, Z79.4     Community acquired pneumonia of right middle lobe of lung     J18.9     Dementia without behavioral disturbance, unspecified dementia type (H)     F03.90           Came to see Adela today as staff still updating on her blood sugars.  Was admitted back on 8/16/22 and on Glipizide oral and Lantus 28 units in PM.    Staff where wheeling Adela into the therapy room.  She was holding onto the w/c so she would not fall out.  No oxygen used at this time.  Came with pneumonia diagnosis and goal was to have that done before she returns home.      Staff stating that her diet includes a lot of Milk as she asks for this.  Trying to think is it really milk she wants or something to drink.  They give her water and she will drink that.  Milk can leave a person thirsty and with her sugars running high, can be more prone to being thirsty.      Will answer questions as best as she can.  Disoriented to place and time.  Answers to name.  SW did a BIMS recently and scored 4/15 and the PHQ-9 = 1  Adela told her she thinks she is getting great care here.        ALLERGIES:    Allergies   Allergen Reactions     Ibuprofen Swelling     Of lips     Lisinopril Cough     Vitamin C Swelling     Of lips, But is fine with  orange juice        MEDICATIONS:  Post Discharge Medication Reconciliation Status: patient was not discharged from an inpatient facility or TCU. Medications reconciled today    Current Outpatient Medications   Medication Sig Dispense Refill     insulin glargine (LANTUS VIAL) 100 UNIT/ML vial 10 units sq in AM and 28 units sq in PM       acetaminophen (TYLENOL) 500 MG tablet Take 500 mg by mouth 2 times daily as needed for mild pain       blood glucose (NO BRAND SPECIFIED) test strip Use to test blood sugar 1 time daily 100 strip 1     blood glucose monitoring (PEREZ MICROLET) lancets Use to test blood sugar 1 times daily 100 each 4     calcium carbonate (OS-KATHY) 1500 (600 Ca) MG tablet Take 600 mg by mouth daily       diltiazem ER COATED BEADS (DILTIAZEM CD) 300 MG 24 hr capsule Take 300 mg by mouth daily       EUTHYROX 100 MCG tablet Take 1 tablet by mouth once daily 30 tablet 0     furosemide (LASIX) 20 MG tablet Take 20 mg by mouth 2 times daily       insulin pen needle (BD NAEEM U/F) 32G X 4 MM miscellaneous Use 1 daily as directed. 100 each 0     ipratropium - albuterol 0.5 mg/2.5 mg/3 mL (DUONEB) 0.5-2.5 (3) MG/3ML neb solution Take 1 vial by nebulization every 6 hours as needed for shortness of breath / dyspnea or wheezing       ketoconazole (NIZORAL) 2 % external shampoo APPLY TO AFFECTED AREA(S) AND WASH OFF AFTER 5 MINUTES. USE 2-3 TIMES PER WEEK. 240 mL 3     order for DME Equipment being ordered: blood pressure cuff 1 Device 0     polyethylene glycol (MIRALAX) 17 g packet Take 17 g by mouth daily       rivaroxaban ANTICOAGULANT (XARELTO) 15 MG TABS tablet Take 15 mg by mouth daily (with dinner)       Vitamin D, Cholecalciferol, 25 MCG (1000 UT) TABS Take 1 tablet (1,000 Units) by mouth daily 90 tablet 3       REVIEW OF SYSTEMS:    Unable to be obtained due to cognitive impairment.  Denied chest pain.  Overall stated she was good.      PHYSICAL EXAM:  BP (!) 118/92   Pulse 78   Temp 97.5  F (36.4  C)    Resp 18   Wt 83 kg (183 lb)   SpO2 92%   BMI 29.99 kg/m    Sitting up in the wheelchair.  Well nourished.    Abdomen large round and soft as now seeing her in a sitting up position.    Coloring is pale and baseline.  Warm and dry.  Lungs are clear but does not take deep breaths on command.  Heart rate regular and slightly irregular but not tachy.    Sugars taken TID:  8am = 170's = 260's  12N = 250-380's  5pm = 280-320's    8/9/2022  HEMOGLOBIN A1C MONITORING (POCT) <=6.4 % 10.2 High        ASSESSMENT / PLAN:  (E11.65,  Z79.4) Type 2 diabetes mellitus with hyperglycemia, with long-term current use of insulin (H)  (primary encounter diagnosis)  Comment: sugars consistently elevated and A1c >10.    Will add 10 units of Lantus in the AM.  Leave the 28 units the same at [facility].  Hopefully this will even out her sugars.  Continue to monitor.  Plan: insulin glargine (LANTUS VIAL) 100 UNIT/ML vial    (J18.9) Community acquired pneumonia of right middle lobe of lung  Comment: feel this is resolving as she does not use the oxygen now.  No signs of shortness of breath.    Will check a CBC and BMP next lab day to check labs ongoing as feel she is a risk for lack of hydration and checking WBC.    (F03.90) Dementia without behavioral disturbance, unspecified dementia type (H)  Comment: - seems happy to be here.  Still needs reminders to use call light.  Will self transfer.  Assist with walker by staff.  No behaviors.      Orders:  1.  Add Lantus 10 units subcutaneous in AM - type 2 DM  2.  CBC and BMP on 8/25/22 for pneumonia, Type 2 DM    Electronically signed by  MARTHA Campos CNP               Sincerely,        MARTHA Campos CNP

## 2022-08-23 RX ORDER — INSULIN GLARGINE 100 [IU]/ML
INJECTION, SOLUTION SUBCUTANEOUS
Start: 2022-08-23

## 2022-08-24 ENCOUNTER — LAB REQUISITION (OUTPATIENT)
Dept: LAB | Facility: CLINIC | Age: 87
End: 2022-08-24
Payer: COMMERCIAL

## 2022-08-24 DIAGNOSIS — E11.8 TYPE 2 DIABETES MELLITUS WITH UNSPECIFIED COMPLICATIONS (H): ICD-10-CM

## 2022-08-24 DIAGNOSIS — J18.9 PNEUMONIA, UNSPECIFIED ORGANISM: ICD-10-CM

## 2022-08-25 LAB
ANION GAP SERPL CALCULATED.3IONS-SCNC: 5 MMOL/L (ref 3–14)
BASOPHILS # BLD AUTO: 0.1 10E3/UL (ref 0–0.2)
BASOPHILS NFR BLD AUTO: 1 %
BUN SERPL-MCNC: 16 MG/DL (ref 7–30)
CALCIUM SERPL-MCNC: 8.5 MG/DL (ref 8.5–10.1)
CHLORIDE BLD-SCNC: 104 MMOL/L (ref 94–109)
CO2 SERPL-SCNC: 30 MMOL/L (ref 20–32)
CREAT SERPL-MCNC: 0.85 MG/DL (ref 0.52–1.04)
EOSINOPHIL # BLD AUTO: 0.3 10E3/UL (ref 0–0.7)
EOSINOPHIL NFR BLD AUTO: 4 %
ERYTHROCYTE [DISTWIDTH] IN BLOOD BY AUTOMATED COUNT: 12.6 % (ref 10–15)
GFR SERPL CREATININE-BSD FRML MDRD: 63 ML/MIN/1.73M2
GLUCOSE BLD-MCNC: 143 MG/DL (ref 70–99)
HCT VFR BLD AUTO: 37 % (ref 35–47)
HGB BLD-MCNC: 12.2 G/DL (ref 11.7–15.7)
IMM GRANULOCYTES # BLD: 0 10E3/UL
IMM GRANULOCYTES NFR BLD: 0 %
LYMPHOCYTES # BLD AUTO: 2.6 10E3/UL (ref 0.8–5.3)
LYMPHOCYTES NFR BLD AUTO: 37 %
MCH RBC QN AUTO: 26.9 PG (ref 26.5–33)
MCHC RBC AUTO-ENTMCNC: 33 G/DL (ref 31.5–36.5)
MCV RBC AUTO: 82 FL (ref 78–100)
MONOCYTES # BLD AUTO: 0.5 10E3/UL (ref 0–1.3)
MONOCYTES NFR BLD AUTO: 8 %
NEUTROPHILS # BLD AUTO: 3.4 10E3/UL (ref 1.6–8.3)
NEUTROPHILS NFR BLD AUTO: 50 %
NRBC # BLD AUTO: 0 10E3/UL
NRBC BLD AUTO-RTO: 0 /100
PLATELET # BLD AUTO: 216 10E3/UL (ref 150–450)
POTASSIUM BLD-SCNC: 4 MMOL/L (ref 3.4–5.3)
RBC # BLD AUTO: 4.53 10E6/UL (ref 3.8–5.2)
SODIUM SERPL-SCNC: 139 MMOL/L (ref 133–144)
WBC # BLD AUTO: 6.8 10E3/UL (ref 4–11)

## 2022-08-25 PROCEDURE — P9603 ONE-WAY ALLOW PRORATED MILES: HCPCS | Performed by: NURSE PRACTITIONER

## 2022-08-25 PROCEDURE — 80048 BASIC METABOLIC PNL TOTAL CA: CPT | Performed by: NURSE PRACTITIONER

## 2022-08-25 PROCEDURE — 36415 COLL VENOUS BLD VENIPUNCTURE: CPT | Performed by: NURSE PRACTITIONER

## 2022-08-25 PROCEDURE — 85025 COMPLETE CBC W/AUTO DIFF WBC: CPT | Performed by: NURSE PRACTITIONER

## 2022-08-29 ENCOUNTER — DISCHARGE SUMMARY NURSING HOME (OUTPATIENT)
Dept: GERIATRICS | Facility: CLINIC | Age: 87
End: 2022-08-29
Payer: COMMERCIAL

## 2022-08-29 VITALS
OXYGEN SATURATION: 96 % | SYSTOLIC BLOOD PRESSURE: 99 MMHG | BODY MASS INDEX: 30.92 KG/M2 | HEIGHT: 66 IN | RESPIRATION RATE: 8 BRPM | HEART RATE: 89 BPM | WEIGHT: 192.4 LBS | DIASTOLIC BLOOD PRESSURE: 58 MMHG | TEMPERATURE: 97.8 F

## 2022-08-29 DIAGNOSIS — J18.9 COMMUNITY ACQUIRED PNEUMONIA OF RIGHT MIDDLE LOBE OF LUNG: Primary | ICD-10-CM

## 2022-08-29 DIAGNOSIS — I48.91 ATRIAL FIBRILLATION WITH RAPID VENTRICULAR RESPONSE (H): ICD-10-CM

## 2022-08-29 DIAGNOSIS — F03.90 DEMENTIA WITHOUT BEHAVIORAL DISTURBANCE, UNSPECIFIED DEMENTIA TYPE: ICD-10-CM

## 2022-08-29 DIAGNOSIS — E03.9 ACQUIRED HYPOTHYROIDISM: ICD-10-CM

## 2022-08-29 DIAGNOSIS — E11.65 TYPE 2 DIABETES MELLITUS WITH HYPERGLYCEMIA, WITHOUT LONG-TERM CURRENT USE OF INSULIN (H): ICD-10-CM

## 2022-08-29 DIAGNOSIS — I50.32 CHRONIC DIASTOLIC HEART FAILURE (H): ICD-10-CM

## 2022-08-29 DIAGNOSIS — M15.0 PRIMARY OSTEOARTHRITIS INVOLVING MULTIPLE JOINTS: ICD-10-CM

## 2022-08-29 DIAGNOSIS — I10 BENIGN ESSENTIAL HYPERTENSION: ICD-10-CM

## 2022-08-29 PROCEDURE — 99316 NF DSCHRG MGMT 30 MIN+: CPT | Performed by: NURSE PRACTITIONER

## 2022-08-29 NOTE — PROGRESS NOTES
[facility] GERIATRICS DISCHARGE SUMMARY  PATIENT'S NAME: Adela Means  YOB: 1927  MEDICAL RECORD NUMBER:  [id number]  Place of Service where encounter took place:  [facility] AT [facility] ([facility]) [[id number]]    PRIMARY CARE PROVIDER AND CLINIC RESPONSIBLE AFTER TRANSFER:   [facility], [address] [facility] / [facility] [address]    Non-FMG Provider     Transferring providers: MARTHA Campos CNP, Cynthia Jacques MD  Recent Hospitalization/ED:  [facility]  Sabrina Ivy stay 8/6/22 to 8/16/22.  Date of SNF Admission: August 16, 2022  Date of SNF (anticipated) Discharge: August 30, 2022  Discharged to: with family daughter  Cognitive Scores: BIMS: 4/15  Physical Function: ambulates with walker- with staff.  will self transfer  DME: No DME needed    CODE STATUS/ADVANCE DIRECTIVES DISCUSSION:  Full Treatment   ALLERGIES: Ibuprofen, Lisinopril, and Vitamin c    NURSING FACILITY COURSE   Medication Changes/Rationale:   8/22/22     1.  Add Lantus 10 units subcutaneous in AM - type 2 DM  2.  CBC and BMP on 8/25/22 for pneumonia, Type 2 DM  8/29/22  Ok to discharge home with list of medications or medications and Lantus.  Follow up with primary provider in 1-2 weeks.        Summary of nursing facility stay:   (J18.9) Community acquired pneumonia of right middle lobe of lung  (primary encounter diagnosis)  Comment:  Resolved.  No ABX therapy currently and able to transition off oxygen.  Is a mouth breather during her sleeping time.  No audible adventitious sounds.    (I48.91) Atrial fibrillation with rapid ventricular response (H)  Comment: Xarelto adjusted in the hospital due to her renal clearance.  Also her Diltiazem dose was increased to 300mg daily.  In the beginning of her stay her HR >100 and probably due to stress and pneumonia.  Has not come down under 100.  Slight irregular rhythm heard.     (E11.65) Type 2 diabetes mellitus with hyperglycemia,  without long-term current use of insulin (H)  Comment: in hospital her Lantus was increased from 20 units in PM to 28 units.  While here, add Lantus 10 units in the AM as sugars consistently around 250 or above.  Simpler if just on one medication.  With the addition of the morning Lantus, saw some sugars now down in the 170's in the AM.    (I10) Benign essential hypertension, BP <140/90  Comment: blood pressures ranged from 's/60-80's.  Currently on Diltiazem CD 300mg po daily.    (I50.32) Chronic diastolic heart failure (H)  Comment: no acute issues while here.  Lasix 20mg twice a day.  Labs done and stable.    (E03.9) Acquired hypothyroidism  Comment: receives Euthrox 100mcg daily.  No changes.    (M89.49) Primary osteoarthritis involving multiple joints  Comment: Tylenol 500mg po twice a day was give.  Did not quite understand to use call light and would self transfers.  Do know of one fall due to getting tangled in her bedding.      (F03.90) Dementia without behavioral disturbance, unspecified dementia type (H)  Comment: no behaviors while here.  Needed many cues to manage.  BIMS 4/15.  Family lives with her to manage her cares      Discharge Medications:  Current Outpatient Medications   Medication Sig Dispense Refill     acetaminophen (TYLENOL) 500 MG tablet Take 500 mg by mouth 2 times daily as needed for mild pain       blood glucose (NO BRAND SPECIFIED) test strip Use to test blood sugar 1 time daily 100 strip 1     blood glucose monitoring (PEREZ MICROLET) lancets Use to test blood sugar 1 times daily 100 each 4     calcium carbonate (OS-KATHY) 1500 (600 Ca) MG tablet Take 600 mg by mouth daily       diltiazem ER COATED BEADS (DILTIAZEM CD) 300 MG 24 hr capsule Take 300 mg by mouth daily       EUTHYROX 100 MCG tablet Take 1 tablet by mouth once daily 30 tablet 0     furosemide (LASIX) 20 MG tablet Take 20 mg by mouth 2 times daily       insulin glargine (LANTUS VIAL) 100 UNIT/ML vial 10 units sq in AM  "and 28 units sq in PM       insulin pen needle (BD NAEEM U/F) 32G X 4 MM miscellaneous Use 1 daily as directed. 100 each 0     ketoconazole (NIZORAL) 2 % external shampoo APPLY TO AFFECTED AREA(S) AND WASH OFF AFTER 5 MINUTES. USE 2-3 TIMES PER WEEK. 240 mL 3     order for DME Equipment being ordered: blood pressure cuff 1 Device 0     polyethylene glycol (MIRALAX) 17 g packet Take 17 g by mouth daily       rivaroxaban ANTICOAGULANT (XARELTO) 15 MG TABS tablet Take 15 mg by mouth daily (with dinner)       Vitamin D, Cholecalciferol, 25 MCG (1000 UT) TABS Take 1 tablet (1,000 Units) by mouth daily 90 tablet 3          Controlled medications:   not applicable/none     Past Medical History:   Past Medical History:   Diagnosis Date     Basal cell carcinoma      Hepatic cyst      Hypertriglyceridemia     mild      Osteopenia     dexa 11/2008     Physical Exam:   Vitals: BP 99/58   Pulse 89   Temp 97.8  F (36.6  C)   Resp 8   Ht 1.664 m (5' 5.5\")   Wt 87.3 kg (192 lb 6.4 oz)   SpO2 96%   BMI 31.53 kg/m    BMI: Body mass index is 31.53 kg/m .  GENERAL APPEARANCE:  in no distress, cooperative, sleeping.  this time she did not attempt to open eyes.  EYES:  Conjunctiva and lids normal, does not wear glasses  RESP:  respiratory effort and palpation of chest normal, lungs clear to auscultation , no respiratory distress  CV:  Palpation and auscultation of heart done , no edema, heart rate regular/irregular  ABDOMEN:  normal bowel sounds, soft, nontender, no hepatosplenomegaly or other masses  M/S:   Gait and station abnormal walker used for mobility.  staff pushed Adela in w/c  SKIN:  skin is pale, dry and warm to touch.  no open areas  PSYCH:  insight and judgement impaired, memory impaired , affect and mood normal, will answer to name called.     SNF labs:   Most Recent 3 CBC's:Recent Labs   Lab Test 08/25/22  0600 05/26/20  0513 05/25/20  0524   WBC 6.8 7.5 10.3   HGB 12.2 15.1 15.5   MCV 82 83 82    191 206 "     Most Recent 3 BMP's:Recent Labs   Lab Test 08/25/22  0600 09/29/20  1417 05/27/20  1437    133 129*   POTASSIUM 4.0 4.9 4.2   CHLORIDE 104 101 95   CO2 30 25 28   BUN 16 24 20   CR 0.85 0.94 0.89   ANIONGAP 5 7 6   KATHY 8.5 9.0 8.7   * 269* 165*       DISCHARGE PLAN:    Follow up labs: No labs orders/due    Medical Follow Up:      Follow up with primary care provider in 1-2 weeks    [facility] scheduled appointments:      Discharge Services: No therapy or home care recommended.     Discharge Instructions Verbalized to Patient at Discharge:     Monitor blood glucose monitoring 3 times a day. Keep a record and bring it to your next primary provider visit.     TOTAL DISCHARGE TIME:   Greater than 30 minutes  Electronically signed by:  MARTHA Campos CNP

## 2022-08-29 NOTE — LETTER
8/29/2022        RE: Aedla Means  [address]  [place] [address]        [facility] GERIATRICS DISCHARGE SUMMARY  PATIENT'S NAME: Adela Means  YOB: 1927  MEDICAL RECORD NUMBER:  [id number]  Place of Service where encounter took place:  [facility] AT [facility] ([facility]) [[id number]]    PRIMARY CARE PROVIDER AND CLINIC RESPONSIBLE AFTER TRANSFER:   [facility], [address] [facility] [address]    Non-FMG Provider     Transferring providers: MARTHA Campos CNP, Cynthia Jacques MD  Recent Hospitalization/ED:  [facility] Yoel Martina stay 8/6/22 to 8/16/22.  Date of SNF Admission: August 16, 2022  Date of SNF (anticipated) Discharge: August 30, 2022  Discharged to: with family daughter  Cognitive Scores: BIMS: 4/15  Physical Function: ambulates with walker- with staff.  will self transfer  DME: No DME needed    CODE STATUS/ADVANCE DIRECTIVES DISCUSSION:  Full Treatment   ALLERGIES: Ibuprofen, Lisinopril, and Vitamin c    NURSING FACILITY COURSE   Medication Changes/Rationale:   8/22/22     1.  Add Lantus 10 units subcutaneous in AM - type 2 DM  2.  CBC and BMP on 8/25/22 for pneumonia, Type 2 DM  8/29/22  Ok to discharge home with list of medications or medications and Lantus.  Follow up with primary provider in 1-2 weeks.        Summary of nursing facility stay:   (J18.9) Community acquired pneumonia of right middle lobe of lung  (primary encounter diagnosis)  Comment:  Resolved.  No ABX therapy currently and able to transition off oxygen.  Is a mouth breather during her sleeping time.  No audible adventitious sounds.    (I48.91) Atrial fibrillation with rapid ventricular response (H)  Comment: Xarelto adjusted in the hospital due to her renal clearance.  Also her Diltiazem dose was increased to 300mg daily.  In the beginning of her stay her HR >100 and probably due to stress and pneumonia.  Has not come down under 100.   Slight irregular rhythm heard.     (E11.65) Type 2 diabetes mellitus with hyperglycemia, without long-term current use of insulin (H)  Comment: in hospital her Lantus was increased from 20 units in PM to 28 units.  While here, add Lantus 10 units in the AM as sugars consistently around 250 or above.  Simpler if just on one medication.  With the addition of the morning Lantus, saw some sugars now down in the 170's in the AM.    (I10) Benign essential hypertension, BP <140/90  Comment: blood pressures ranged from 's/60-80's.  Currently on Diltiazem CD 300mg po daily.    (I50.32) Chronic diastolic heart failure (H)  Comment: no acute issues while here.  Lasix 20mg twice a day.  Labs done and stable.    (E03.9) Acquired hypothyroidism  Comment: receives Euthrox 100mcg daily.  No changes.    (M89.49) Primary osteoarthritis involving multiple joints  Comment: Tylenol 500mg po twice a day was give.  Did not quite understand to use call light and would self transfers.  Do know of one fall due to getting tangled in her bedding.      (F03.90) Dementia without behavioral disturbance, unspecified dementia type (H)  Comment: no behaviors while here.  Needed many cues to manage.  BIMS 4/15.  Family lives with her to manage her cares      Discharge Medications:  Current Outpatient Medications   Medication Sig Dispense Refill     acetaminophen (TYLENOL) 500 MG tablet Take 500 mg by mouth 2 times daily as needed for mild pain       blood glucose (NO BRAND SPECIFIED) test strip Use to test blood sugar 1 time daily 100 strip 1     blood glucose monitoring (PEREZ MICROLET) lancets Use to test blood sugar 1 times daily 100 each 4     calcium carbonate (OS-KATHY) 1500 (600 Ca) MG tablet Take 600 mg by mouth daily       diltiazem ER COATED BEADS (DILTIAZEM CD) 300 MG 24 hr capsule Take 300 mg by mouth daily       EUTHYROX 100 MCG tablet Take 1 tablet by mouth once daily 30 tablet 0     furosemide (LASIX) 20 MG tablet Take 20 mg by  "mouth 2 times daily       insulin glargine (LANTUS VIAL) 100 UNIT/ML vial 10 units sq in AM and 28 units sq in PM       insulin pen needle (BD NAEEM U/F) 32G X 4 MM miscellaneous Use 1 daily as directed. 100 each 0     ketoconazole (NIZORAL) 2 % external shampoo APPLY TO AFFECTED AREA(S) AND WASH OFF AFTER 5 MINUTES. USE 2-3 TIMES PER WEEK. 240 mL 3     order for DME Equipment being ordered: blood pressure cuff 1 Device 0     polyethylene glycol (MIRALAX) 17 g packet Take 17 g by mouth daily       rivaroxaban ANTICOAGULANT (XARELTO) 15 MG TABS tablet Take 15 mg by mouth daily (with dinner)       Vitamin D, Cholecalciferol, 25 MCG (1000 UT) TABS Take 1 tablet (1,000 Units) by mouth daily 90 tablet 3          Controlled medications:   not applicable/none     Past Medical History:   Past Medical History:   Diagnosis Date     Basal cell carcinoma      Hepatic cyst      Hypertriglyceridemia     mild      Osteopenia     dexa 11/2008     Physical Exam:   Vitals: BP 99/58   Pulse 89   Temp 97.8  F (36.6  C)   Resp 8   Ht 1.664 m (5' 5.5\")   Wt 87.3 kg (192 lb 6.4 oz)   SpO2 96%   BMI 31.53 kg/m    BMI: Body mass index is 31.53 kg/m .  GENERAL APPEARANCE:  in no distress, cooperative, sleeping.  this time she did not attempt to open eyes.  EYES:  Conjunctiva and lids normal, does not wear glasses  RESP:  respiratory effort and palpation of chest normal, lungs clear to auscultation , no respiratory distress  CV:  Palpation and auscultation of heart done , no edema, heart rate regular/irregular  ABDOMEN:  normal bowel sounds, soft, nontender, no hepatosplenomegaly or other masses  M/S:   Gait and station abnormal walker used for mobility.  staff pushed Adela in w/c  SKIN:  skin is pale, dry and warm to touch.  no open areas  PSYCH:  insight and judgement impaired, memory impaired , affect and mood normal, will answer to name called.     SNF labs:   Most Recent 3 CBC's:Recent Labs   Lab Test 08/25/22  0600 05/26/20  0513 " 05/25/20  0524   WBC 6.8 7.5 10.3   HGB 12.2 15.1 15.5   MCV 82 83 82    191 206     Most Recent 3 BMP's:Recent Labs   Lab Test 08/25/22  0600 09/29/20  1417 05/27/20  1437    133 129*   POTASSIUM 4.0 4.9 4.2   CHLORIDE 104 101 95   CO2 30 25 28   BUN 16 24 20   CR 0.85 0.94 0.89   ANIONGAP 5 7 6   KATHY 8.5 9.0 8.7   * 269* 165*       DISCHARGE PLAN:    Follow up labs: No labs orders/due    Medical Follow Up:      Follow up with primary care provider in 1-2 weeks    [facility] scheduled appointments:      Discharge Services: No therapy or home care recommended.     Discharge Instructions Verbalized to Patient at Discharge:     Monitor blood glucose monitoring 3 times a day. Keep a record and bring it to your next primary provider visit.     TOTAL DISCHARGE TIME:   Greater than 30 minutes  Electronically signed by:  MARTHA Campos CNP                       Sincerely,        MARTHA Campos CNP

## 2022-10-22 NOTE — NURSING NOTE
"Chief Complaint   Patient presents with     Hospital F/U       Initial /56   Pulse 72   Temp 99  F (37.2  C) (Tympanic)   Resp 20   Wt 94.8 kg (209 lb)   SpO2 92%   BMI 34.25 kg/m   Estimated body mass index is 34.25 kg/m  as calculated from the following:    Height as of 5/25/20: 1.664 m (5' 5.5\").    Weight as of this encounter: 94.8 kg (209 lb).    Patient presents to the clinic using No DME    Health Maintenance that is potentially due pending provider review:  NONE    n/a    Is there anyone who you would like to be able to receive your results? No  If yes have patient fill out MIREYA    "
Otitis media

## 2023-02-21 NOTE — TELEPHONE ENCOUNTER
Last TSH was high  I will refill her for 3 months, as she has been high in the past, and we didn't change her dose.   She is also due for microalbumin  Yoselyn Castrejon, EVELIA    TSH   Date Value Ref Range Status   02/28/2020 7.41 (H) 0.40 - 4.00 mU/L Final   08/26/2019 3.17 0.40 - 4.00 mU/L Final   04/09/2019 8.03 (H) 0.40 - 4.00 mU/L Final   03/13/2018 3.36 0.40 - 4.00 mU/L Final   02/24/2017 3.34 0.40 - 4.00 mU/L Final        12

## 2023-09-17 DIAGNOSIS — E11.65 TYPE 2 DIABETES MELLITUS WITH HYPERGLYCEMIA, WITHOUT LONG-TERM CURRENT USE OF INSULIN (H): Chronic | ICD-10-CM
